# Patient Record
Sex: MALE | URBAN - METROPOLITAN AREA
[De-identification: names, ages, dates, MRNs, and addresses within clinical notes are randomized per-mention and may not be internally consistent; named-entity substitution may affect disease eponyms.]

---

## 2017-10-18 ENCOUNTER — OFFICE VISIT (OUTPATIENT)
Dept: DERMATOLOGY | Facility: AMBULATORY SURGERY CENTER | Age: 52
End: 2017-10-18

## 2017-10-18 VITALS
RESPIRATION RATE: 18 BRPM | OXYGEN SATURATION: 98 % | HEART RATE: 81 BPM | WEIGHT: 240 LBS | HEIGHT: 73 IN | DIASTOLIC BLOOD PRESSURE: 88 MMHG | SYSTOLIC BLOOD PRESSURE: 110 MMHG | TEMPERATURE: 98.2 F | BODY MASS INDEX: 31.81 KG/M2

## 2017-10-18 DIAGNOSIS — L82.1 SEBORRHEIC KERATOSES: ICD-10-CM

## 2017-10-18 DIAGNOSIS — D18.01 CHERRY ANGIOMA: ICD-10-CM

## 2017-10-18 DIAGNOSIS — L72.9 CYST OF SKIN: ICD-10-CM

## 2017-10-18 DIAGNOSIS — L91.8 CUTANEOUS SKIN TAGS: ICD-10-CM

## 2017-10-18 DIAGNOSIS — L81.4 LENTIGINES: ICD-10-CM

## 2017-10-18 DIAGNOSIS — D22.9 MULTIPLE BENIGN NEVI: Primary | ICD-10-CM

## 2017-10-18 NOTE — PROGRESS NOTES
Chief Complaint   Patient presents with    Skin Exam     1. Have you been to the ER, urgent care clinic since your last visit? Hospitalized since your last visit? No    2. Have you seen or consulted any other health care providers outside of the [address] [place] [address] since your last visit? Include any pap smears or colon screening.  No

## 2017-10-18 NOTE — PROGRESS NOTES
Name: Breanna Shukla       Age: 47 y.o. Date: 10/18/2017    Chief Complaint:   Chief Complaint   Patient presents with    Skin Exam       Subjective:    HPI  Mr. Tess Galdamez III is a 47 y.o. male who presents as a new patient to [facility] for a skin exam.  The patient was referred for this evaluation by himself. The patient has not had a skin exam in the past and the patient does have current complaints related to his skin. He reports a large cyst on the right posterior neck for many years. This was drained he reports about 20 years ago. It bothers his hard hat. He reports bothersome skin tags in the groin/ medial thighs. Some get twisted at time causing pain. The patient's pertinent skin history includes: no personal history of skin cancer but reports sun burns in the past. Typically wears a hat to protect his scalp. ROS: Constitutional: Negative. Dermatological : positive for - skin lesion changes      Social History     Social History    Marital status: UNKNOWN     Spouse name: N/A    Number of children: N/A    Years of education: N/A     Occupational History    Not on file. Social History Main Topics    Smoking status: Current Every Day Smoker     Packs/day: 1.00    Smokeless tobacco: Never Used    Alcohol use Yes    Drug use: Not on file    Sexual activity: Not on file     Other Topics Concern    Not on file     Social History Narrative    No narrative on file       History reviewed. No pertinent family history. Past Medical History:   Diagnosis Date    Arsenic suspected exposure     Sun-damaged skin     Sunburn, blistering        History reviewed. No pertinent surgical history.         No Known Allergies      Objective:    Visit Vitals    /88 (BP 1 Location: Left arm, BP Patient Position: Sitting)    Pulse 81    Temp 98.2 °F (36.8 °C) (Oral)    Resp 18    Ht 6' 1\" (1.854 m)    Wt 108.9 kg (240 lb)    SpO2 98%    BMI 31.66 kg/m2 Flavia Neff is a 47 y.o. male who appears well and in no distress. He is awake, alert, and oriented. There is no preauricular, submandibular, or cervical lymphadenopathy. A skin examination was performed including his scalp, face (including eyelid), ears, neck, chest, back, abdomen, upper extremities (including digits/nails), lower extremities, breasts, buttocks; genital skin was not examined. There is a 3 cm mobile cystic structure on the posterior right neck. This is not inflamed currently. He has areas of sebaceous hyperplasia and seborrheic keratoses on the face which are pink-yellow papules and skin toned papules respectively. He has lentigines on sun exposed areas. There is a few red papules consistent with cherry angiomas. He is scattered stuck on waxy macules consistent with seborrheic keratosis. There are medium brown junctional nevi without concerning features. There are numerous skin colored skin tags in the axillas and medial thighs. Assessment/Plan:  1. Normal nevi. The diagnosis of normal nevi was reviewed. I discussed sun protection, sunscreen use, the warning signs of skin cancer, the need for self-skin examinations, and the need for regular practitioner exams every 1 year. The patient should follow up sooner as needed if new, changing, or symptomatic skin lesions arise. 2. Seborrheic keratoses. The diagnosis was reviewed and the patient was reassured that no treatment is needed for these benign lesions. 3.Cherry angiomas. The diagnosis was reviewed and the patient was reassured that no treatment is needed for these benign lesions. 4.Solar lentigos. The diagnosis and relationship to sun exposure was reviewed. Sun protection advised. 5. Cyst of skin. The diagnosis discussed and patient will schedule for excision with Dr. Maldonado Stokes at his convenience. 6. Cutaneous skin tags. Snip removal of 15 tags were completed.  Patient understands this is an out of pocket procedure. Cleansing before was done with alcohol, Drysol used for hemostasis. Bandage applied and care reviewed. Discussed option for pathology but patient deferred.

## 2017-10-18 NOTE — MR AVS SNAPSHOT
Visit Information Date & Time Provider Department Dept. Phone Encounter #  
 10/18/2017 11:30 AM Ksenia Jarrell NP [facility] [phone] [id number] Upcoming Health Maintenance Date Due DTaP/Tdap/Td series (1 - Tdap) 9/10/1987 FOBT Q 1 YEAR AGE 50-75 9/10/2016 INFLUENZA AGE 9 TO ADULT 8/1/2017 Allergies as of 10/18/2017  Review Complete On: 10/18/2017 By: Lakisha Paige No Known Allergies Current Immunizations  Never Reviewed No immunizations on file. Not reviewed this visit Vitals BP Pulse Temp Resp Height(growth percentile) Weight(growth percentile) 110/88 (BP 1 Location: Left arm, BP Patient Position: Sitting) 81 98.2 °F (36.8 °C) (Oral) 18 6' 1\" (1.854 m) 240 lb (108.9 kg) SpO2 BMI Smoking Status 98% 31.66 kg/m2 Current Every Day Smoker BMI and BSA Data Body Mass Index Body Surface Area  
 31.66 kg/m 2 2.37 m 2 Preferred Pharmacy Pharmacy Name Phone [facility] [place] [facility] [address] [phone] Your Updated Medication List  
  
Notice  As of 10/18/2017 11:34 AM  
 You have not been prescribed any medications. Patient Instructions Self Skin Exam and Sunscreens Early detection and treatment is essential in the treatment of all forms of skin cancer. If caught early, all forms of skin cancer are curable. In addition to your regular visits, you should perform a monthly skin examination. Over time, you become familiar with what is normally found on your skin and can identify new or suspicious spots. One of the screening tools you can use to assess your skin is to follow the ABCDEs: 
 
A= Asymmetry (One half is unlike the other half) B= Border (An irregular, scalloped or poorly defined edge) C= Color (Is varied from one area to another, has shades of tan, brown/ black,       white, red or blue) D= Diameter (Spots larger than 6mm or a pencil eraser) E= Evolving (New spots or one that is changing in size, shape, or color) A follow- up interval will be customized based on your history of skin cancer or level of skin damage and risk factors. In any case, if you notice a suspicious or new spot, an appointment should be arranged between regular visits. Everyone should use sunscreen and sun-safe practices, which is especially important for those with a personal or family history of skin cancer. Suggestions for this include: 1. Use daily moisturizers containing SPF 30 or higher. 2. Wear long sleeve clothing with UPF ratings and a broad-brimmed hat. 3. Apply sunscreen with SPF 30 or higher to all sun exposed areas if you are going to be in the sun. A broad spectrum UVA/ UVB sunscreen is best.  Dont forget to REAPPLY every two hours or more often if swimming or sweating! 4. Avoid outside activities during peak sun hours, especially in the summer (10am- 2pm). 5. DO NOT use tanning beds. Using sunscreen and sun-safe practices can help reduce the likelihood of developing skin cancer or additional skin cancers in those previously diagnosed. Introducing [facility]! [employer] introduces [employer] patient portal. Now you can access parts of your medical record, email your doctor's office, and request medication refills online. 1. In your internet browser, go to [email or web address] 2. Click on the First Time User? Click Here link in the Sign In box. You will see the New Member Sign Up page. 3. Enter your [employer] Access Code exactly as it appears below. You will not need to use this code after youve completed the sign-up process. If you do not sign up before the expiration date, you must request a new code. · [employer] Access Code: [email or web address] Expires: 1/16/2018 11:33 AM 
 
4.  Enter the last four digits of your Social Security Number (xxxx) and Date of Birth (mm/dd/yyyy) as indicated and click Submit. You will be taken to the next sign-up page. 5. Create a [employer] ID. This will be your [employer] login ID and cannot be changed, so think of one that is secure and easy to remember. 6. Create a [employer] password. You can change your password at any time. 7. Enter your Password Reset Question and Answer. This can be used at a later time if you forget your password. 8. Enter your e-mail address. You will receive e-mail notification when new information is available in [address] 9. Click Sign Up. You can now view and download portions of your medical record. 10. Click the Download Summary menu link to download a portable copy of your medical information. If you have questions, please visit the Frequently Asked Questions section of the [employer] website. Remember, [employer] is NOT to be used for urgent needs. For medical emergencies, dial 911. Now available from your iPhone and Android! Please provide this summary of care documentation to your next provider. If you have any questions after today's visit, please call [phone].

## 2017-11-30 ENCOUNTER — OFFICE VISIT (OUTPATIENT)
Dept: DERMATOLOGY | Facility: AMBULATORY SURGERY CENTER | Age: 52
End: 2017-11-30

## 2017-11-30 ENCOUNTER — HOSPITAL ENCOUNTER (OUTPATIENT)
Dept: LAB | Age: 52
Discharge: HOME | End: 2017-11-30

## 2017-11-30 DIAGNOSIS — L72.0 EPIDERMAL CYST: Primary | ICD-10-CM

## 2017-11-30 NOTE — PROGRESS NOTES
Written by Alexis Rivera, as dictated by Dr. Marisela Major. Paddy Ni MD.    CC: Epidermal cyst on the right posterior neck    History of Present Illness:    Jose Silverman III is a 47 y.o. male referred by Mike Dang DNP. He has an epidermal cyst on the right posterior neck. This is a new epidermal cyst present for 20 years described as a bothersome lesion with no prior treatment but was previously drained. This is a clinically diagnosed epidermal cyst. He has no pain, no current illnesses, no other skin concerns. His allergies, medications, medical, and social history are reviewed by me today. Exam:    He is an awake, alert, and oriented 47 y.o. male who appears well and in no distress.  I examined his neck. He has a 45 x 35 mm mobile subcutaneous nodule on his right posterior neck. He confirms the location. Assessment/Plan:    1. Epidermal cyst, right posterior neck. Excision was advised for removal and therapy of this 45 x 35 mm lesion on the right posterior neck.  The excision procedure was reviewed and verbal and written consents were obtained.  The risks of pain, bleeding, infection, recurrence of the lesion, and scar were discussed.  I performed the procedure.  The site was cleansed and anesthetized with 1% lidocaine with epinephrine 1:100,000.  The lesion was excised in an elliptical manner to a depth of subcutaneous tissue.  A complex repair was performed after the excision. Three layers of 4-0 polysorb suture were used for skin approximation: deep subcutis, mid subcutis and dermis. The closure length was 33 mm.  The wound was bandaged and care reviewed.  The specimen was sent to pathology. I will contact the patient with the results and any further treatment that may be necessary. The documentation recorded by the scribe accurately reflects the service I personally performed and the decisions made by me.      [address] [facility]   OFFICE PROCEDURE PROGRESS NOTE     Chart reviewed for the following:     Paulina Meyers MD, have reviewed the History, Physical and updated the Allergic reactions for Susan Mujica III    TIME OUT performed immediately prior to start of procedure:     Paulina Meyers MD, have performed the following reviews on Susan Mujica III prior to the start of the procedure:     * Patient was identified by name and date of birth   * Agreement on procedure being performed was verified   * Risks and Benefits explained to the patient   * Procedure site verified and marked as necessary   * Patient was positioned for comfort   * Consent was signed and verified     Time: 3:30 PM   Date of procedure: 11/30/2017  Procedure performed by: Sebas Vargas.  Julian Meyers MD   Provider assisted by: LPN   Patient assisted by: self   How tolerated by patient: tolerated the procedure well with no complications   Comments: none

## 2024-02-29 ENCOUNTER — HOSPITAL ENCOUNTER (OUTPATIENT)
Facility: HOSPITAL | Age: 59
Discharge: HOME | End: 2024-02-29
Attending: INTERNAL MEDICINE
Payer: COMMERCIAL

## 2024-02-29 DIAGNOSIS — Z87.891 PERSONAL HISTORY OF TOBACCO USE: ICD-10-CM

## 2024-02-29 PROCEDURE — 71271 CT THORAX LUNG CANCER SCR C-: CPT

## 2024-11-11 ENCOUNTER — HOSPITAL ENCOUNTER (EMERGENCY)
Facility: HOSPITAL | Age: 59
Discharge: HOME | End: 2024-11-11
Attending: EMERGENCY MEDICINE
Payer: COMMERCIAL

## 2024-11-11 VITALS
BODY MASS INDEX: 33.46 KG/M2 | WEIGHT: 247 LBS | DIASTOLIC BLOOD PRESSURE: 74 MMHG | RESPIRATION RATE: 17 BRPM | HEART RATE: 65 BPM | OXYGEN SATURATION: 99 % | HEIGHT: 72 IN | SYSTOLIC BLOOD PRESSURE: 120 MMHG | TEMPERATURE: 97.6 F

## 2024-11-11 DIAGNOSIS — L03.012 PARONYCHIA OF LEFT MIDDLE FINGER: Primary | ICD-10-CM

## 2024-11-11 PROCEDURE — 10060 I&D ABSCESS SIMPLE/SINGLE: CPT

## 2024-11-11 PROCEDURE — 99283 EMERGENCY DEPT VISIT LOW MDM: CPT

## 2024-11-11 RX ORDER — MUPIROCIN 20 MG/G
OINTMENT TOPICAL
Qty: 3 G | Refills: 1 | Status: SHIPPED | OUTPATIENT
Start: 2024-11-11

## 2024-11-11 RX ORDER — CLINDAMYCIN HYDROCHLORIDE 300 MG/1
300 CAPSULE ORAL 3 TIMES DAILY
Qty: 30 CAPSULE | Refills: 0 | Status: SHIPPED | OUTPATIENT
Start: 2024-11-11 | End: 2024-11-21

## 2024-11-11 ASSESSMENT — PAIN SCALES - GENERAL: PAINLEVEL_OUTOF10: 1

## 2024-11-11 ASSESSMENT — LIFESTYLE VARIABLES
HOW OFTEN DO YOU HAVE A DRINK CONTAINING ALCOHOL: NEVER
HOW MANY STANDARD DRINKS CONTAINING ALCOHOL DO YOU HAVE ON A TYPICAL DAY: PATIENT DOES NOT DRINK

## 2024-11-11 ASSESSMENT — PAIN - FUNCTIONAL ASSESSMENT: PAIN_FUNCTIONAL_ASSESSMENT: 0-10

## 2024-11-11 NOTE — ED TRIAGE NOTES
PT arrived with c/o an infected left middle finger that he noticed a week ago, has attempted to manage at home but it has not improved

## 2024-11-11 NOTE — DISCHARGE INSTRUCTIONS
It was a pleasure taking care of you at [facility] Emergency Department today.  We know that when you come to [facility], you are entrusting us with your health, comfort, and safety.  Our physicians and nurses honor that trust, and we truly appreciate the opportunity to care for you and your loved ones.      We also value your feedback.  If you receive a survey about your Emergency Department experience today, please fill it out.  We care about our patients' feedback, and we listen to what you have to say.  Thank you!

## 2024-11-11 NOTE — ED PROVIDER NOTES
[facility] EMERGENCY DEP  EMERGENCY DEPARTMENT ENCOUNTER       Pt Name: Jose Díaz III  MRN: [id number]  Birthdate 1965  Date of evaluation: 11/11/2024  Provider: JUANCARLOS Adler   PCP: No primary care provider on file.  Note Started: 3:32 PM EST 11/11/24     CHIEF COMPLAINT       Chief Complaint   Patient presents with    Finger Injury     HISTORY OF PRESENT ILLNESS: 1 or more elements      History From: Patient  HPI Limitations: None     Jose Díaz III is a 59 y.o. male who presents by POV with complaints of pain to the left middle finger. He reports that about one week ago he was fixing a trashcan and may have hurt his finger. Since then the area around the nail has become swollen, tender and inflamed. His wife used an insulin needle and was able to drain some pus. Since then it has looked better. Denies fevers and chills. No history of DM or skin infections in the past.      Nursing Notes were all reviewed and agreed with or any disagreements were addressed in the HPI.     REVIEW OF SYSTEMS      Review of Systems     Positives and Pertinent negatives as per HPI.    PAST HISTORY     Past Medical History:  History reviewed. No pertinent past medical history.    Past Surgical History:  History reviewed. No pertinent surgical history.    Family History:  History reviewed. No pertinent family history.    Social History:       Allergies:  No Known Allergies    CURRENT MEDICATIONS      Previous Medications    No medications on file       SCREENINGS               No data recorded        PHYSICAL EXAM      ED Triage Vitals [11/11/24 1524]   Encounter Vitals Group      /80      Systolic BP Percentile       Diastolic BP Percentile       Pulse 62      Respirations 18      Temp 97.6 °F (36.4 °C)      Temp src       SpO2 96 %      Weight - Scale 112 kg (247 lb)      Height 1.829 m (6')      Head Circumference       Peak Flow       Pain Score       Pain Loc       Pain Education       Exclude from Growth Chart

## 2024-12-09 ENCOUNTER — TRANSCRIBE ORDERS (OUTPATIENT)
Facility: HOSPITAL | Age: 59
End: 2024-12-09

## 2024-12-09 DIAGNOSIS — F17.210 CIGARETTE SMOKER: ICD-10-CM

## 2024-12-09 DIAGNOSIS — Z87.891 PERSONAL HISTORY OF TOBACCO USE: Primary | ICD-10-CM

## 2025-04-11 ENCOUNTER — TRANSCRIBE ORDERS (OUTPATIENT)
Facility: HOSPITAL | Age: 60
End: 2025-04-11

## 2025-04-11 DIAGNOSIS — Z87.891 PERSONAL HISTORY OF TOBACCO USE: Primary | ICD-10-CM

## 2025-04-11 DIAGNOSIS — F17.210 CIGARETTE SMOKER: ICD-10-CM

## 2025-05-11 ENCOUNTER — APPOINTMENT (OUTPATIENT)
Facility: HOSPITAL | Age: 60
End: 2025-05-11
Payer: COMMERCIAL

## 2025-05-11 ENCOUNTER — HOSPITAL ENCOUNTER (INPATIENT)
Facility: HOSPITAL | Age: 60
LOS: 9 days | Discharge: HOME | DRG: 189 | End: 2025-05-20
Attending: INTERNAL MEDICINE | Admitting: INTERNAL MEDICINE
Payer: COMMERCIAL

## 2025-05-11 ENCOUNTER — HOSPITAL ENCOUNTER (EMERGENCY)
Facility: HOSPITAL | Age: 60
Discharge: ANOTHER HOSPITAL | End: 2025-05-11
Attending: EMERGENCY MEDICINE
Payer: COMMERCIAL

## 2025-05-11 VITALS
WEIGHT: 238.3 LBS | TEMPERATURE: 98.2 F | DIASTOLIC BLOOD PRESSURE: 108 MMHG | HEIGHT: 72 IN | RESPIRATION RATE: 27 BRPM | OXYGEN SATURATION: 96 % | BODY MASS INDEX: 32.28 KG/M2 | HEART RATE: 100 BPM | SYSTOLIC BLOOD PRESSURE: 153 MMHG

## 2025-05-11 DIAGNOSIS — R07.9 CHEST PAIN, UNSPECIFIED TYPE: ICD-10-CM

## 2025-05-11 DIAGNOSIS — I50.9 ACUTE CONGESTIVE HEART FAILURE, UNSPECIFIED HEART FAILURE TYPE (HCC): Primary | ICD-10-CM

## 2025-05-11 DIAGNOSIS — I35.0 SEVERE AORTIC STENOSIS: ICD-10-CM

## 2025-05-11 DIAGNOSIS — R79.89 ELEVATED BRAIN NATRIURETIC PEPTIDE (BNP) LEVEL: ICD-10-CM

## 2025-05-11 DIAGNOSIS — R60.0 LEG EDEMA, RIGHT: Primary | ICD-10-CM

## 2025-05-11 DIAGNOSIS — I50.9 CONGESTIVE HEART FAILURE, UNSPECIFIED HF CHRONICITY, UNSPECIFIED HEART FAILURE TYPE (HCC): ICD-10-CM

## 2025-05-11 PROBLEM — J96.01 ACUTE HYPOXEMIC RESPIRATORY FAILURE (HCC): Status: ACTIVE | Noted: 2025-05-11

## 2025-05-11 LAB
ALBUMIN SERPL-MCNC: 3.3 G/DL (ref 3.5–5)
ALBUMIN/GLOB SERPL: 1 (ref 1.1–2.2)
ALP SERPL-CCNC: 61 U/L (ref 45–117)
ALT SERPL-CCNC: 45 U/L (ref 12–78)
ANION GAP SERPL CALC-SCNC: 3 MMOL/L (ref 2–12)
ANION GAP SERPL CALC-SCNC: 3 MMOL/L (ref 2–12)
APPEARANCE UR: CLEAR
AST SERPL-CCNC: 26 U/L (ref 15–37)
BACTERIA URNS QL MICRO: NEGATIVE /HPF
BASOPHILS # BLD: 0 K/UL (ref 0–0.1)
BASOPHILS NFR BLD: 0 % (ref 0–1)
BILIRUB SERPL-MCNC: 1 MG/DL (ref 0.2–1)
BILIRUB UR QL: NEGATIVE
BUN SERPL-MCNC: 14 MG/DL (ref 6–20)
BUN SERPL-MCNC: 15 MG/DL (ref 6–20)
BUN/CREAT SERPL: 19 (ref 12–20)
BUN/CREAT SERPL: 19 (ref 12–20)
CALCIUM SERPL-MCNC: 8.4 MG/DL (ref 8.5–10.1)
CALCIUM SERPL-MCNC: 8.6 MG/DL (ref 8.5–10.1)
CHLORIDE SERPL-SCNC: 104 MMOL/L (ref 97–108)
CHLORIDE SERPL-SCNC: 104 MMOL/L (ref 97–108)
CO2 SERPL-SCNC: 32 MMOL/L (ref 21–32)
CO2 SERPL-SCNC: 34 MMOL/L (ref 21–32)
COLOR UR: NORMAL
CREAT SERPL-MCNC: 0.72 MG/DL (ref 0.7–1.3)
CREAT SERPL-MCNC: 0.78 MG/DL (ref 0.7–1.3)
DIFFERENTIAL METHOD BLD: ABNORMAL
EOSINOPHIL # BLD: 0 K/UL (ref 0–0.4)
EOSINOPHIL NFR BLD: 0 % (ref 0–7)
EPITH CASTS URNS QL MICRO: NORMAL /LPF
ERYTHROCYTE [DISTWIDTH] IN BLOOD BY AUTOMATED COUNT: 14.9 % (ref 11.5–14.5)
FLUAV RNA SPEC QL NAA+PROBE: NOT DETECTED
FLUBV RNA SPEC QL NAA+PROBE: NOT DETECTED
GLOBULIN SER CALC-MCNC: 3.4 G/DL (ref 2–4)
GLUCOSE BLD STRIP.AUTO-MCNC: 124 MG/DL (ref 65–117)
GLUCOSE SERPL-MCNC: 129 MG/DL (ref 65–100)
GLUCOSE SERPL-MCNC: 135 MG/DL (ref 65–100)
GLUCOSE UR STRIP.AUTO-MCNC: NEGATIVE MG/DL
HCT VFR BLD AUTO: 44.5 % (ref 36.6–50.3)
HGB BLD-MCNC: 14.3 G/DL (ref 12.1–17)
HGB UR QL STRIP: NEGATIVE
IMM GRANULOCYTES # BLD AUTO: 0 K/UL (ref 0–0.04)
IMM GRANULOCYTES NFR BLD AUTO: 0 % (ref 0–0.5)
KETONES UR QL STRIP.AUTO: NEGATIVE MG/DL
LACTATE SERPL-SCNC: 1.3 MMOL/L (ref 0.4–2)
LEUKOCYTE ESTERASE UR QL STRIP.AUTO: NEGATIVE
LYMPHOCYTES # BLD: 0.46 K/UL (ref 0.8–3.5)
LYMPHOCYTES NFR BLD: 3 % (ref 12–49)
MAGNESIUM SERPL-MCNC: 2.1 MG/DL (ref 1.6–2.4)
MCH RBC QN AUTO: 31.8 PG (ref 26–34)
MCHC RBC AUTO-ENTMCNC: 32.1 G/DL (ref 30–36.5)
MCV RBC AUTO: 98.9 FL (ref 80–99)
MONOCYTES # BLD: 1.23 K/UL (ref 0–1)
MONOCYTES NFR BLD: 8 % (ref 5–13)
MUCOUS THREADS URNS QL MICRO: NORMAL /LPF
NEUTS BAND NFR BLD MANUAL: 3 %
NEUTS SEG # BLD: 13.71 K/UL (ref 1.8–8)
NEUTS SEG NFR BLD: 86 % (ref 32–75)
NITRITE UR QL STRIP.AUTO: NEGATIVE
NRBC # BLD: 0 K/UL (ref 0–0.01)
NRBC BLD-RTO: 0 PER 100 WBC
NT PRO BNP: ABNORMAL PG/ML (ref 0–125)
PH UR STRIP: 6.5 (ref 5–8)
PLATELET # BLD AUTO: 158 K/UL (ref 150–400)
PLATELET COMMENT: ABNORMAL
PMV BLD AUTO: 10 FL (ref 8.9–12.9)
POTASSIUM SERPL-SCNC: 4.1 MMOL/L (ref 3.5–5.1)
POTASSIUM SERPL-SCNC: 4.2 MMOL/L (ref 3.5–5.1)
PROCALCITONIN SERPL-MCNC: 0.06 NG/ML
PROCALCITONIN SERPL-MCNC: <0.05 NG/ML
PROT SERPL-MCNC: 6.7 G/DL (ref 6.4–8.2)
PROT UR STRIP-MCNC: NEGATIVE MG/DL
RBC # BLD AUTO: 4.5 M/UL (ref 4.1–5.7)
RBC #/AREA URNS HPF: NORMAL /HPF (ref 0–5)
RBC MORPH BLD: ABNORMAL
SARS-COV-2 RNA RESP QL NAA+PROBE: NOT DETECTED
SERVICE CMNT-IMP: ABNORMAL
SODIUM SERPL-SCNC: 139 MMOL/L (ref 136–145)
SODIUM SERPL-SCNC: 141 MMOL/L (ref 136–145)
SOURCE: NORMAL
SP GR UR REFRACTOMETRY: 1.02 (ref 1–1.03)
TROPONIN I SERPL HS-MCNC: 202 NG/L (ref 0–76)
TROPONIN I SERPL HS-MCNC: 216 NG/L (ref 0–76)
TROPONIN I SERPL HS-MCNC: 218 NG/L (ref 0–76)
TROPONIN I SERPL HS-MCNC: 260 NG/L (ref 0–76)
URINE CULTURE IF INDICATED: NORMAL
UROBILINOGEN UR QL STRIP.AUTO: 1 EU/DL (ref 0.2–1)
WBC # BLD AUTO: 15.4 K/UL (ref 4.1–11.1)
WBC URNS QL MICRO: NORMAL /HPF (ref 0–4)

## 2025-05-11 PROCEDURE — 96367 TX/PROPH/DG ADDL SEQ IV INF: CPT

## 2025-05-11 PROCEDURE — 6370000000 HC RX 637 (ALT 250 FOR IP): Performed by: INTERNAL MEDICINE

## 2025-05-11 PROCEDURE — 6360000002 HC RX W HCPCS: Performed by: INTERNAL MEDICINE

## 2025-05-11 PROCEDURE — 81001 URINALYSIS AUTO W/SCOPE: CPT

## 2025-05-11 PROCEDURE — 84484 ASSAY OF TROPONIN QUANT: CPT

## 2025-05-11 PROCEDURE — 83880 ASSAY OF NATRIURETIC PEPTIDE: CPT

## 2025-05-11 PROCEDURE — 96365 THER/PROPH/DIAG IV INF INIT: CPT

## 2025-05-11 PROCEDURE — 94640 AIRWAY INHALATION TREATMENT: CPT

## 2025-05-11 PROCEDURE — 71045 X-RAY EXAM CHEST 1 VIEW: CPT

## 2025-05-11 PROCEDURE — 83605 ASSAY OF LACTIC ACID: CPT

## 2025-05-11 PROCEDURE — 87636 SARSCOV2 & INF A&B AMP PRB: CPT

## 2025-05-11 PROCEDURE — 84145 PROCALCITONIN (PCT): CPT

## 2025-05-11 PROCEDURE — 5A09357 ASSISTANCE WITH RESPIRATORY VENTILATION, LESS THAN 24 CONSECUTIVE HOURS, CONTINUOUS POSITIVE AIRWAY PRESSURE: ICD-10-PCS | Performed by: INTERNAL MEDICINE

## 2025-05-11 PROCEDURE — 80053 COMPREHEN METABOLIC PANEL: CPT

## 2025-05-11 PROCEDURE — 6360000002 HC RX W HCPCS: Performed by: EMERGENCY MEDICINE

## 2025-05-11 PROCEDURE — 96375 TX/PRO/DX INJ NEW DRUG ADDON: CPT

## 2025-05-11 PROCEDURE — 80048 BASIC METABOLIC PNL TOTAL CA: CPT

## 2025-05-11 PROCEDURE — 5A0945A ASSISTANCE WITH RESPIRATORY VENTILATION, 24-96 CONSECUTIVE HOURS, HIGH NASAL FLOW/VELOCITY: ICD-10-PCS | Performed by: INTERNAL MEDICINE

## 2025-05-11 PROCEDURE — 93005 ELECTROCARDIOGRAM TRACING: CPT | Performed by: EMERGENCY MEDICINE

## 2025-05-11 PROCEDURE — 6360000002 HC RX W HCPCS: Performed by: NURSE PRACTITIONER

## 2025-05-11 PROCEDURE — 82962 GLUCOSE BLOOD TEST: CPT

## 2025-05-11 PROCEDURE — 87040 BLOOD CULTURE FOR BACTERIA: CPT

## 2025-05-11 PROCEDURE — 2000000000 HC ICU R&B

## 2025-05-11 PROCEDURE — 6370000000 HC RX 637 (ALT 250 FOR IP): Performed by: EMERGENCY MEDICINE

## 2025-05-11 PROCEDURE — 36415 COLL VENOUS BLD VENIPUNCTURE: CPT

## 2025-05-11 PROCEDURE — 93005 ELECTROCARDIOGRAM TRACING: CPT

## 2025-05-11 PROCEDURE — 2500000003 HC RX 250 WO HCPCS: Performed by: INTERNAL MEDICINE

## 2025-05-11 PROCEDURE — 85025 COMPLETE CBC W/AUTO DIFF WBC: CPT

## 2025-05-11 PROCEDURE — 2580000003 HC RX 258: Performed by: EMERGENCY MEDICINE

## 2025-05-11 PROCEDURE — 83735 ASSAY OF MAGNESIUM: CPT

## 2025-05-11 PROCEDURE — 94660 CPAP INITIATION&MGMT: CPT

## 2025-05-11 PROCEDURE — 96366 THER/PROPH/DIAG IV INF ADDON: CPT

## 2025-05-11 PROCEDURE — 99291 CRITICAL CARE FIRST HOUR: CPT

## 2025-05-11 RX ORDER — FUROSEMIDE 10 MG/ML
40 INJECTION INTRAMUSCULAR; INTRAVENOUS ONCE
Status: DISCONTINUED | OUTPATIENT
Start: 2025-05-11 | End: 2025-05-11

## 2025-05-11 RX ORDER — GLUCAGON 1 MG/ML
1 KIT INJECTION PRN
Status: DISCONTINUED | OUTPATIENT
Start: 2025-05-11 | End: 2025-05-20 | Stop reason: HOSPADM

## 2025-05-11 RX ORDER — FLUTICASONE FUROATE, UMECLIDINIUM BROMIDE AND VILANTEROL TRIFENATATE 100; 62.5; 25 UG/1; UG/1; UG/1
1 POWDER RESPIRATORY (INHALATION) DAILY
Status: ON HOLD | COMMUNITY
End: 2025-05-20

## 2025-05-11 RX ORDER — ENOXAPARIN SODIUM 150 MG/ML
1 INJECTION SUBCUTANEOUS 2 TIMES DAILY
Status: DISCONTINUED | OUTPATIENT
Start: 2025-05-11 | End: 2025-05-13

## 2025-05-11 RX ORDER — ALBUTEROL SULFATE 0.83 MG/ML
2.5 SOLUTION RESPIRATORY (INHALATION) EVERY 4 HOURS PRN
Status: DISCONTINUED | OUTPATIENT
Start: 2025-05-11 | End: 2025-05-12

## 2025-05-11 RX ORDER — MIDAZOLAM HYDROCHLORIDE 1 MG/ML
2 INJECTION, SOLUTION INTRAMUSCULAR; INTRAVENOUS ONCE
Status: DISCONTINUED | OUTPATIENT
Start: 2025-05-11 | End: 2025-05-11 | Stop reason: HOSPADM

## 2025-05-11 RX ORDER — IPRATROPIUM BROMIDE AND ALBUTEROL SULFATE 2.5; .5 MG/3ML; MG/3ML
1 SOLUTION RESPIRATORY (INHALATION)
Status: COMPLETED | OUTPATIENT
Start: 2025-05-11 | End: 2025-05-11

## 2025-05-11 RX ORDER — ACETAMINOPHEN 650 MG/1
650 SUPPOSITORY RECTAL EVERY 6 HOURS PRN
Status: DISCONTINUED | OUTPATIENT
Start: 2025-05-11 | End: 2025-05-20 | Stop reason: HOSPADM

## 2025-05-11 RX ORDER — DEXTROSE MONOHYDRATE 100 MG/ML
INJECTION, SOLUTION INTRAVENOUS CONTINUOUS PRN
Status: DISCONTINUED | OUTPATIENT
Start: 2025-05-11 | End: 2025-05-20 | Stop reason: HOSPADM

## 2025-05-11 RX ORDER — SODIUM CHLORIDE 0.9 % (FLUSH) 0.9 %
5-40 SYRINGE (ML) INJECTION PRN
Status: DISCONTINUED | OUTPATIENT
Start: 2025-05-11 | End: 2025-05-19 | Stop reason: SDUPTHER

## 2025-05-11 RX ORDER — ONDANSETRON 2 MG/ML
4 INJECTION INTRAMUSCULAR; INTRAVENOUS EVERY 6 HOURS PRN
Status: DISCONTINUED | OUTPATIENT
Start: 2025-05-11 | End: 2025-05-20 | Stop reason: HOSPADM

## 2025-05-11 RX ORDER — ONDANSETRON 4 MG/1
4 TABLET, ORALLY DISINTEGRATING ORAL EVERY 8 HOURS PRN
Status: DISCONTINUED | OUTPATIENT
Start: 2025-05-11 | End: 2025-05-20 | Stop reason: HOSPADM

## 2025-05-11 RX ORDER — SODIUM CHLORIDE 9 MG/ML
INJECTION, SOLUTION INTRAVENOUS PRN
Status: DISCONTINUED | OUTPATIENT
Start: 2025-05-11 | End: 2025-05-20 | Stop reason: HOSPADM

## 2025-05-11 RX ORDER — POLYETHYLENE GLYCOL 3350 17 G/17G
17 POWDER, FOR SOLUTION ORAL DAILY PRN
Status: DISCONTINUED | OUTPATIENT
Start: 2025-05-11 | End: 2025-05-20 | Stop reason: HOSPADM

## 2025-05-11 RX ORDER — ACETAMINOPHEN 325 MG/1
650 TABLET ORAL EVERY 6 HOURS PRN
Status: DISCONTINUED | OUTPATIENT
Start: 2025-05-11 | End: 2025-05-20 | Stop reason: HOSPADM

## 2025-05-11 RX ORDER — IPRATROPIUM BROMIDE AND ALBUTEROL SULFATE 2.5; .5 MG/3ML; MG/3ML
1 SOLUTION RESPIRATORY (INHALATION) ONCE
Status: COMPLETED | OUTPATIENT
Start: 2025-05-11 | End: 2025-05-11

## 2025-05-11 RX ORDER — FUROSEMIDE 10 MG/ML
40 INJECTION INTRAMUSCULAR; INTRAVENOUS ONCE
Status: COMPLETED | OUTPATIENT
Start: 2025-05-11 | End: 2025-05-11

## 2025-05-11 RX ORDER — INSULIN LISPRO 100 [IU]/ML
0-8 INJECTION, SOLUTION INTRAVENOUS; SUBCUTANEOUS
Status: DISCONTINUED | OUTPATIENT
Start: 2025-05-11 | End: 2025-05-20 | Stop reason: HOSPADM

## 2025-05-11 RX ORDER — FUROSEMIDE 10 MG/ML
40 INJECTION INTRAMUSCULAR; INTRAVENOUS
Status: COMPLETED | OUTPATIENT
Start: 2025-05-11 | End: 2025-05-11

## 2025-05-11 RX ORDER — SODIUM CHLORIDE 0.9 % (FLUSH) 0.9 %
5-40 SYRINGE (ML) INJECTION EVERY 12 HOURS SCHEDULED
Status: DISCONTINUED | OUTPATIENT
Start: 2025-05-11 | End: 2025-05-19 | Stop reason: SDUPTHER

## 2025-05-11 RX ADMIN — IPRATROPIUM BROMIDE AND ALBUTEROL SULFATE 1 DOSE: .5; 2.5 SOLUTION RESPIRATORY (INHALATION) at 15:38

## 2025-05-11 RX ADMIN — ENOXAPARIN SODIUM 105 MG: 150 INJECTION SUBCUTANEOUS at 19:49

## 2025-05-11 RX ADMIN — ARFORMOTEROL TARTRATE: 15 SOLUTION RESPIRATORY (INHALATION) at 20:20

## 2025-05-11 RX ADMIN — IPRATROPIUM BROMIDE AND ALBUTEROL SULFATE 1 DOSE: .5; 3 SOLUTION RESPIRATORY (INHALATION) at 20:27

## 2025-05-11 RX ADMIN — IPRATROPIUM BROMIDE AND ALBUTEROL SULFATE 1 DOSE: .5; 2.5 SOLUTION RESPIRATORY (INHALATION) at 12:26

## 2025-05-11 RX ADMIN — PIPERACILLIN AND TAZOBACTAM 4500 MG: 4; .5 INJECTION, POWDER, FOR SOLUTION INTRAVENOUS at 12:37

## 2025-05-11 RX ADMIN — FUROSEMIDE 40 MG: 10 INJECTION, SOLUTION INTRAMUSCULAR; INTRAVENOUS at 13:41

## 2025-05-11 RX ADMIN — FUROSEMIDE 40 MG: 10 INJECTION, SOLUTION INTRAMUSCULAR; INTRAVENOUS at 20:13

## 2025-05-11 RX ADMIN — IPRATROPIUM BROMIDE 0.5 MG: 0.5 SOLUTION RESPIRATORY (INHALATION) at 20:15

## 2025-05-11 RX ADMIN — SODIUM CHLORIDE, PRESERVATIVE FREE 10 ML: 5 INJECTION INTRAVENOUS at 19:54

## 2025-05-11 RX ADMIN — WATER 2000 MG: 1 INJECTION INTRAMUSCULAR; INTRAVENOUS; SUBCUTANEOUS at 19:50

## 2025-05-11 RX ADMIN — SODIUM CHLORIDE 2500 MG: 0.9 INJECTION, SOLUTION INTRAVENOUS at 13:09

## 2025-05-11 RX ADMIN — WATER 80 MG: 1 INJECTION INTRAMUSCULAR; INTRAVENOUS; SUBCUTANEOUS at 19:45

## 2025-05-11 RX ADMIN — INSULIN HUMAN 10 UNITS: 100 INJECTION, SUSPENSION SUBCUTANEOUS at 19:54

## 2025-05-11 RX ADMIN — IPRATROPIUM BROMIDE AND ALBUTEROL SULFATE 1 DOSE: .5; 2.5 SOLUTION RESPIRATORY (INHALATION) at 15:40

## 2025-05-11 ASSESSMENT — LIFESTYLE VARIABLES
HOW MANY STANDARD DRINKS CONTAINING ALCOHOL DO YOU HAVE ON A TYPICAL DAY: PATIENT DOES NOT DRINK
HOW OFTEN DO YOU HAVE A DRINK CONTAINING ALCOHOL: NEVER

## 2025-05-11 ASSESSMENT — PAIN - FUNCTIONAL ASSESSMENT
PAIN_FUNCTIONAL_ASSESSMENT: NONE - DENIES PAIN
PAIN_FUNCTIONAL_ASSESSMENT: 0-10

## 2025-05-11 ASSESSMENT — PAIN SCALES - GENERAL: PAINLEVEL_OUTOF10: 0

## 2025-05-11 NOTE — ED NOTES
Patient had a 4 beat run of V-tach and patient became acutely SOB with extreme diaphoresis.  Er provider in room.  Repeat EKG completed with repeat portable chest.  RT called for treatment

## 2025-05-11 NOTE — ED PROVIDER NOTES
[facility] EMERGENCY DEPARTMENT  EMERGENCY DEPARTMENT ENCOUNTER       Pt Name: Jose Díaz III  MRN: [id number]  Birthdate 1965  Date of evaluation: 5/11/2025  Provider: Mattie Reich MD   PCP: No primary care provider on file.  Note Started: 1:00 PM EDT 5/11/25     CHIEF COMPLAINT       Chief Complaint   Patient presents with    Shortness of Breath        HISTORY OF PRESENT ILLNESS: 1 or more elements      History From: Patient, History limited by: none     Jose Díaz III is a 59 y.o. male presents to ED complaining of shortness of breath.  Onset of shortness of breath gradually worsening over the past week, suddenly worse today.  History of COPD.  Denies fever.  Reports some phlegm.       Please See MDM for Additional Details of the HPI/PMH  Nursing Notes were all reviewed and agreed with or any disagreements were addressed in the HPI.     REVIEW OF SYSTEMS        Positives and Pertinent negatives as per HPI.    PAST HISTORY     Past Medical History:  No past medical history on file.    Past Surgical History:  No past surgical history on file.    Family History:  No family history on file.    Social History:       CURRENT MEDICATIONS      Previous Medications    MUPIROCIN (BACTROBAN) 2 % OINTMENT    Apply topically to affected area 3 times daily.       SCREENINGS               No data recorded            PHYSICAL EXAM      ED Triage Vitals [05/11/25 1212]   Encounter Vitals Group      BP (!) 125/102      Systolic BP Percentile       Diastolic BP Percentile       Pulse (!) 104      Respirations 25      Temp       Temp src       SpO2 (!) 77 %      Weight - Scale 108.1 kg (238 lb 4.8 oz)      Height 1.829 m (6')      Head Circumference       Peak Flow       Pain Score       Pain Loc       Pain Education       Exclude from Growth Chart         Physical Exam  Constitutional:       General: He is in acute distress.   Cardiovascular:      Rate and Rhythm: Normal rate and regular rhythm.   Pulmonary:

## 2025-05-11 NOTE — ED NOTES
TRANSFER - OUT REPORT:    Verbal report given to Mamta Marin RN on Jose Díaz III  being transferred to [facility] for routine progression of patient care       Report consisted of patient's Situation, Background, Assessment and   Recommendations(SBAR).     Information from the following report(s) Nurse Handoff Report, ED SBAR, MAR, Recent Results, Cardiac Rhythm Sinus Tach, and Neuro Assessment was reviewed with the receiving nurse.    [place] Fall Assessment:    Presents to emergency department  because of falls (Syncope, seizure, or loss of consciousness): No  Age > 70: No  Altered Mental Status, Intoxication with alcohol or substance confusion (Disorientation, impaired judgment, poor safety awaremess, or inability to follow instructions): No  Impaired Mobility: Ambulates or transfers with assistive devices or assistance; Unable to ambulate or transer.: No  Nursing Judgement: No          Lines:   Peripheral IV 05/11/25 Right Antecubital (Active)   Site Assessment Clean, dry & intact 05/11/25 1216   Line Status Blood return noted;Flushed 05/11/25 1216   Line Care Connections checked and tightened 05/11/25 1216   Phlebitis Assessment No symptoms 05/11/25 1216   Infiltration Assessment 0 05/11/25 1216   Dressing Status Clean, dry & intact 05/11/25 1216   Dressing Type Transparent 05/11/25 1216   Dressing Intervention New 05/11/25 1216       Peripheral IV 05/11/25 Left Antecubital (Active)   Site Assessment Clean, dry & intact 05/11/25 1539   Line Status Brisk blood return;Blood return noted 05/11/25 1539   Line Care Connections checked and tightened 05/11/25 1539   Phlebitis Assessment No symptoms 05/11/25 1539   Infiltration Assessment 0 05/11/25 1539   Alcohol Cap Used Yes 05/11/25 1539   Dressing Status Clean, dry & intact 05/11/25 1539   Dressing Type Transparent 05/11/25 1539   Dressing Intervention New 05/11/25 1539        Opportunity for questions and clarification was provided.      Patient transported

## 2025-05-11 NOTE — ED TRIAGE NOTES
SOB that started yesterday, denies fever, or recent illness. Hx COPD. Used rescue inhaler at home without relief.

## 2025-05-11 NOTE — PROGRESS NOTES
Nebulizer treatment stopped by provider.  Proceeded to place pt on Bipap, 14/6, pt tolerating and appears more comfortable.

## 2025-05-11 NOTE — ED NOTES
[facility] here to  patient. Report given to [facility] by Dr. Reich

## 2025-05-11 NOTE — PROGRESS NOTES
1710- Patient being transferred from [facility] to [facility] CCU for routine progression of care. Verbal SBAR report received from Roxane Henriquez RN.     1830- Patient arrived to CCU 2510 accompanied by flight RN & paramedic.     1835- Dr. Shabazz at bedside to assess patient. Bi-pap removed and switched to 6L NC by respiratory. New orders received for EKG & labs.     1840- Up to bedside commode. Pt became increasingly dyspneic following ambulation with spo2 dropping to 70s. Assisted back to bed and placed back on Bi-pap by respiratory.     1856- troponin and procalcitonin sent to lab.     1905- EKG complete and brought to Dr. Shabazz.

## 2025-05-12 ENCOUNTER — APPOINTMENT (OUTPATIENT)
Facility: HOSPITAL | Age: 60
DRG: 189 | End: 2025-05-12
Attending: INTERNAL MEDICINE
Payer: COMMERCIAL

## 2025-05-12 LAB
ALBUMIN SERPL-MCNC: 2.8 G/DL (ref 3.5–5)
ALBUMIN/GLOB SERPL: 0.9 (ref 1.1–2.2)
ALP SERPL-CCNC: 50 U/L (ref 45–117)
ALT SERPL-CCNC: 33 U/L (ref 12–78)
ANION GAP SERPL CALC-SCNC: 3 MMOL/L (ref 2–12)
AST SERPL-CCNC: 22 U/L (ref 15–37)
BASOPHILS # BLD: 0.02 K/UL (ref 0–0.1)
BASOPHILS NFR BLD: 0.2 % (ref 0–1)
BILIRUB SERPL-MCNC: 0.7 MG/DL (ref 0.2–1)
BUN SERPL-MCNC: 15 MG/DL (ref 6–20)
BUN/CREAT SERPL: 21 (ref 12–20)
CALCIUM SERPL-MCNC: 8.4 MG/DL (ref 8.5–10.1)
CHLORIDE SERPL-SCNC: 103 MMOL/L (ref 97–108)
CO2 SERPL-SCNC: 34 MMOL/L (ref 21–32)
CREAT SERPL-MCNC: 0.73 MG/DL (ref 0.7–1.3)
DIFFERENTIAL METHOD BLD: ABNORMAL
ECHO AO ROOT DIAM: 3.3 CM
ECHO AO ROOT INDEX: 1.43 CM/M2
ECHO AV AREA PEAK VELOCITY: 0.8 CM2
ECHO AV AREA VTI: 0.7 CM2
ECHO AV AREA/BSA PEAK VELOCITY: 0.3 CM2/M2
ECHO AV AREA/BSA VTI: 0.3 CM2/M2
ECHO AV MEAN GRADIENT: 68 MMHG
ECHO AV MEAN VELOCITY: 4 M/S
ECHO AV PEAK GRADIENT: 99 MMHG
ECHO AV PEAK VELOCITY: 5 M/S
ECHO AV VELOCITY RATIO: 0.18
ECHO AV VTI: 113.7 CM
ECHO BSA: 2.36 M2
ECHO BSA: 2.36 M2
ECHO EST RA PRESSURE: 8 MMHG
ECHO LA DIAMETER INDEX: 2.29 CM/M2
ECHO LA DIAMETER: 5.3 CM
ECHO LA TO AORTIC ROOT RATIO: 1.61
ECHO LV EF PHYSICIAN: 40 %
ECHO LV FRACTIONAL SHORTENING: 25 % (ref 28–44)
ECHO LV INTERNAL DIMENSION DIASTOLE INDEX: 2.77 CM/M2
ECHO LV INTERNAL DIMENSION DIASTOLIC: 6.4 CM (ref 4.2–5.9)
ECHO LV INTERNAL DIMENSION SYSTOLIC INDEX: 2.08 CM/M2
ECHO LV INTERNAL DIMENSION SYSTOLIC: 4.8 CM
ECHO LV IVSD: 1.7 CM (ref 0.6–1)
ECHO LV MASS 2D: 565.6 G (ref 88–224)
ECHO LV MASS INDEX 2D: 244.8 G/M2 (ref 49–115)
ECHO LV POSTERIOR WALL DIASTOLIC: 1.7 CM (ref 0.6–1)
ECHO LV RELATIVE WALL THICKNESS RATIO: 0.53
ECHO LVOT AREA: 4.5 CM2
ECHO LVOT AV VTI INDEX: 0.15
ECHO LVOT DIAM: 2.4 CM
ECHO LVOT MEAN GRADIENT: 2 MMHG
ECHO LVOT PEAK GRADIENT: 3 MMHG
ECHO LVOT PEAK VELOCITY: 0.9 M/S
ECHO LVOT STROKE VOLUME INDEX: 33.7 ML/M2
ECHO LVOT SV: 77.8 ML
ECHO LVOT VTI: 17.2 CM
EKG ATRIAL RATE: 106 BPM
EKG ATRIAL RATE: 96 BPM
EKG DIAGNOSIS: NORMAL
EKG DIAGNOSIS: NORMAL
EKG P AXIS: 64 DEGREES
EKG P AXIS: 66 DEGREES
EKG P-R INTERVAL: 188 MS
EKG P-R INTERVAL: 198 MS
EKG Q-T INTERVAL: 348 MS
EKG Q-T INTERVAL: 376 MS
EKG QRS DURATION: 104 MS
EKG QRS DURATION: 108 MS
EKG QTC CALCULATION (BAZETT): 462 MS
EKG QTC CALCULATION (BAZETT): 475 MS
EKG R AXIS: -34 DEGREES
EKG R AXIS: -36 DEGREES
EKG T AXIS: 111 DEGREES
EKG T AXIS: 120 DEGREES
EKG VENTRICULAR RATE: 106 BPM
EKG VENTRICULAR RATE: 96 BPM
EOSINOPHIL # BLD: 0 K/UL (ref 0–0.4)
EOSINOPHIL NFR BLD: 0 % (ref 0–7)
ERYTHROCYTE [DISTWIDTH] IN BLOOD BY AUTOMATED COUNT: 14.7 % (ref 11.5–14.5)
EST. AVERAGE GLUCOSE BLD GHB EST-MCNC: 103 MG/DL
GLOBULIN SER CALC-MCNC: 3.1 G/DL (ref 2–4)
GLUCOSE BLD STRIP.AUTO-MCNC: 116 MG/DL (ref 65–117)
GLUCOSE BLD STRIP.AUTO-MCNC: 133 MG/DL (ref 65–117)
GLUCOSE BLD STRIP.AUTO-MCNC: 144 MG/DL (ref 65–117)
GLUCOSE BLD STRIP.AUTO-MCNC: 187 MG/DL (ref 65–117)
GLUCOSE SERPL-MCNC: 141 MG/DL (ref 65–100)
HBA1C MFR BLD: 5.2 % (ref 4–5.6)
HCT VFR BLD AUTO: 42.2 % (ref 36.6–50.3)
HGB BLD-MCNC: 13.3 G/DL (ref 12.1–17)
IMM GRANULOCYTES # BLD AUTO: 0.06 K/UL (ref 0–0.04)
IMM GRANULOCYTES NFR BLD AUTO: 0.5 % (ref 0–0.5)
LYMPHOCYTES # BLD: 0.11 K/UL (ref 0.8–3.5)
LYMPHOCYTES NFR BLD: 0.9 % (ref 12–49)
MCH RBC QN AUTO: 31.1 PG (ref 26–34)
MCHC RBC AUTO-ENTMCNC: 31.5 G/DL (ref 30–36.5)
MCV RBC AUTO: 98.8 FL (ref 80–99)
MONOCYTES # BLD: 0.32 K/UL (ref 0–1)
MONOCYTES NFR BLD: 2.7 % (ref 5–13)
NEUTS SEG # BLD: 11.2 K/UL (ref 1.8–8)
NEUTS SEG NFR BLD: 95.7 % (ref 32–75)
NRBC # BLD: 0 K/UL (ref 0–0.01)
NRBC BLD-RTO: 0 PER 100 WBC
NT PRO BNP: ABNORMAL PG/ML
PLATELET # BLD AUTO: 134 K/UL (ref 150–400)
PMV BLD AUTO: 10.1 FL (ref 8.9–12.9)
POTASSIUM SERPL-SCNC: 4.1 MMOL/L (ref 3.5–5.1)
PROCALCITONIN SERPL-MCNC: 0.1 NG/ML
PROT SERPL-MCNC: 5.9 G/DL (ref 6.4–8.2)
RBC # BLD AUTO: 4.27 M/UL (ref 4.1–5.7)
RBC MORPH BLD: ABNORMAL
SERVICE CMNT-IMP: ABNORMAL
SERVICE CMNT-IMP: NORMAL
SODIUM SERPL-SCNC: 140 MMOL/L (ref 136–145)
TROPONIN I SERPL HS-MCNC: 180 NG/L (ref 0–76)
WBC # BLD AUTO: 11.7 K/UL (ref 4.1–11.1)

## 2025-05-12 PROCEDURE — 6360000002 HC RX W HCPCS: Performed by: INTERNAL MEDICINE

## 2025-05-12 PROCEDURE — 71045 X-RAY EXAM CHEST 1 VIEW: CPT

## 2025-05-12 PROCEDURE — 84484 ASSAY OF TROPONIN QUANT: CPT

## 2025-05-12 PROCEDURE — 85025 COMPLETE CBC W/AUTO DIFF WBC: CPT

## 2025-05-12 PROCEDURE — 94660 CPAP INITIATION&MGMT: CPT

## 2025-05-12 PROCEDURE — 93970 EXTREMITY STUDY: CPT

## 2025-05-12 PROCEDURE — 82962 GLUCOSE BLOOD TEST: CPT

## 2025-05-12 PROCEDURE — 6370000000 HC RX 637 (ALT 250 FOR IP): Performed by: INTERNAL MEDICINE

## 2025-05-12 PROCEDURE — 94640 AIRWAY INHALATION TREATMENT: CPT

## 2025-05-12 PROCEDURE — 36415 COLL VENOUS BLD VENIPUNCTURE: CPT

## 2025-05-12 PROCEDURE — 84145 PROCALCITONIN (PCT): CPT

## 2025-05-12 PROCEDURE — 93308 TTE F-UP OR LMTD: CPT

## 2025-05-12 PROCEDURE — 2060000000 HC ICU INTERMEDIATE R&B

## 2025-05-12 PROCEDURE — 2500000003 HC RX 250 WO HCPCS: Performed by: INTERNAL MEDICINE

## 2025-05-12 PROCEDURE — 6360000004 HC RX CONTRAST MEDICATION: Performed by: STUDENT IN AN ORGANIZED HEALTH CARE EDUCATION/TRAINING PROGRAM

## 2025-05-12 PROCEDURE — 80053 COMPREHEN METABOLIC PANEL: CPT

## 2025-05-12 PROCEDURE — 71275 CT ANGIOGRAPHY CHEST: CPT

## 2025-05-12 PROCEDURE — 83036 HEMOGLOBIN GLYCOSYLATED A1C: CPT

## 2025-05-12 PROCEDURE — 83880 ASSAY OF NATRIURETIC PEPTIDE: CPT

## 2025-05-12 RX ORDER — PANTOPRAZOLE SODIUM 40 MG/1
40 TABLET, DELAYED RELEASE ORAL
Status: DISCONTINUED | OUTPATIENT
Start: 2025-05-13 | End: 2025-05-20 | Stop reason: HOSPADM

## 2025-05-12 RX ORDER — DIAZEPAM 5 MG/1
10 TABLET ORAL
Status: DISCONTINUED | OUTPATIENT
Start: 2025-05-12 | End: 2025-05-20 | Stop reason: HOSPADM

## 2025-05-12 RX ORDER — PREDNISONE 5 MG/1
15 TABLET ORAL DAILY
Status: DISCONTINUED | OUTPATIENT
Start: 2025-05-16 | End: 2025-05-15

## 2025-05-12 RX ORDER — DIAZEPAM 5 MG/1
20 TABLET ORAL
Status: DISCONTINUED | OUTPATIENT
Start: 2025-05-12 | End: 2025-05-20 | Stop reason: HOSPADM

## 2025-05-12 RX ORDER — IOPAMIDOL 755 MG/ML
100 INJECTION, SOLUTION INTRAVASCULAR
Status: COMPLETED | OUTPATIENT
Start: 2025-05-12 | End: 2025-05-12

## 2025-05-12 RX ORDER — PREDNISONE 5 MG/1
10 TABLET ORAL DAILY
Status: DISCONTINUED | OUTPATIENT
Start: 2025-05-19 | End: 2025-05-15

## 2025-05-12 RX ORDER — DIAZEPAM 5 MG/1
15 TABLET ORAL
Status: DISCONTINUED | OUTPATIENT
Start: 2025-05-12 | End: 2025-05-20 | Stop reason: HOSPADM

## 2025-05-12 RX ORDER — DIAZEPAM 10 MG/2ML
10 INJECTION, SOLUTION INTRAMUSCULAR; INTRAVENOUS
Status: DISCONTINUED | OUTPATIENT
Start: 2025-05-12 | End: 2025-05-20 | Stop reason: HOSPADM

## 2025-05-12 RX ORDER — DIAZEPAM 10 MG/2ML
15 INJECTION, SOLUTION INTRAMUSCULAR; INTRAVENOUS
Status: DISCONTINUED | OUTPATIENT
Start: 2025-05-12 | End: 2025-05-20 | Stop reason: HOSPADM

## 2025-05-12 RX ORDER — PREDNISONE 5 MG/1
5 TABLET ORAL DAILY
Status: DISCONTINUED | OUTPATIENT
Start: 2025-05-22 | End: 2025-05-15

## 2025-05-12 RX ORDER — PREDNISONE 20 MG/1
20 TABLET ORAL DAILY
Status: COMPLETED | OUTPATIENT
Start: 2025-05-13 | End: 2025-05-15

## 2025-05-12 RX ORDER — LEVOFLOXACIN 750 MG/1
750 TABLET, FILM COATED ORAL EVERY 24 HOURS
Status: COMPLETED | OUTPATIENT
Start: 2025-05-12 | End: 2025-05-15

## 2025-05-12 RX ORDER — LEVALBUTEROL INHALATION SOLUTION 0.63 MG/3ML
0.63 SOLUTION RESPIRATORY (INHALATION) EVERY 8 HOURS PRN
Status: DISCONTINUED | OUTPATIENT
Start: 2025-05-12 | End: 2025-05-20 | Stop reason: HOSPADM

## 2025-05-12 RX ORDER — DIAZEPAM 10 MG/2ML
20 INJECTION, SOLUTION INTRAMUSCULAR; INTRAVENOUS
Status: DISCONTINUED | OUTPATIENT
Start: 2025-05-12 | End: 2025-05-20 | Stop reason: HOSPADM

## 2025-05-12 RX ORDER — DIAZEPAM 5 MG/1
5 TABLET ORAL
Status: DISCONTINUED | OUTPATIENT
Start: 2025-05-12 | End: 2025-05-20 | Stop reason: HOSPADM

## 2025-05-12 RX ORDER — FUROSEMIDE 10 MG/ML
40 INJECTION INTRAMUSCULAR; INTRAVENOUS 2 TIMES DAILY
Status: DISCONTINUED | OUTPATIENT
Start: 2025-05-12 | End: 2025-05-16

## 2025-05-12 RX ORDER — DIAZEPAM 10 MG/2ML
5 INJECTION, SOLUTION INTRAMUSCULAR; INTRAVENOUS
Status: DISCONTINUED | OUTPATIENT
Start: 2025-05-12 | End: 2025-05-20 | Stop reason: HOSPADM

## 2025-05-12 RX ADMIN — FUROSEMIDE 40 MG: 10 INJECTION, SOLUTION INTRAMUSCULAR; INTRAVENOUS at 17:48

## 2025-05-12 RX ADMIN — IOPAMIDOL 100 ML: 755 INJECTION, SOLUTION INTRAVENOUS at 18:20

## 2025-05-12 RX ADMIN — SODIUM CHLORIDE, PRESERVATIVE FREE 10 ML: 5 INJECTION INTRAVENOUS at 08:08

## 2025-05-12 RX ADMIN — INSULIN LISPRO 2 UNITS: 100 INJECTION, SOLUTION INTRAVENOUS; SUBCUTANEOUS at 17:48

## 2025-05-12 RX ADMIN — IPRATROPIUM BROMIDE 0.5 MG: 0.5 SOLUTION RESPIRATORY (INHALATION) at 01:14

## 2025-05-12 RX ADMIN — SULFUR HEXAFLUORIDE 2 ML: KIT at 11:31

## 2025-05-12 RX ADMIN — FUROSEMIDE 40 MG: 10 INJECTION, SOLUTION INTRAMUSCULAR; INTRAVENOUS at 11:10

## 2025-05-12 RX ADMIN — ENOXAPARIN SODIUM 105 MG: 150 INJECTION SUBCUTANEOUS at 08:08

## 2025-05-12 RX ADMIN — IPRATROPIUM BROMIDE 0.5 MG: 0.5 SOLUTION RESPIRATORY (INHALATION) at 13:23

## 2025-05-12 RX ADMIN — IPRATROPIUM BROMIDE 0.5 MG: 0.5 SOLUTION RESPIRATORY (INHALATION) at 21:01

## 2025-05-12 RX ADMIN — IPRATROPIUM BROMIDE 0.5 MG: 0.5 SOLUTION RESPIRATORY (INHALATION) at 07:43

## 2025-05-12 RX ADMIN — ENOXAPARIN SODIUM 105 MG: 150 INJECTION SUBCUTANEOUS at 22:37

## 2025-05-12 RX ADMIN — ARFORMOTEROL TARTRATE: 15 SOLUTION RESPIRATORY (INHALATION) at 20:59

## 2025-05-12 RX ADMIN — Medication 1 AMPULE: at 08:59

## 2025-05-12 RX ADMIN — ARFORMOTEROL TARTRATE: 15 SOLUTION RESPIRATORY (INHALATION) at 07:49

## 2025-05-12 RX ADMIN — LEVOFLOXACIN 750 MG: 750 TABLET, FILM COATED ORAL at 11:10

## 2025-05-12 RX ADMIN — WATER 80 MG: 1 INJECTION INTRAMUSCULAR; INTRAVENOUS; SUBCUTANEOUS at 08:08

## 2025-05-12 ASSESSMENT — PAIN SCALES - GENERAL: PAINLEVEL_OUTOF10: 0

## 2025-05-12 NOTE — PROGRESS NOTES
Spiritual Health History and Assessment/Progress Note  [facility]    Advance Care Planning,  ,  ,      Name: Jose Díaz III MRN: [id number]    Age: 59 y.o.     Sex: male   Language: English   Holiness: Unknown   Acute hypoxemic respiratory failure (HCC)     Date: 5/12/2025            Total Time Calculated: 16 min              Spiritual Assessment began in [facility] PROGRESSIVE CARE        Referral/Consult From: Rounding   Encounter Overview/Reason: Advance Care Planning  Service Provided For: Patient    [place], Belief, Meaning:   Patient has beliefs or practices that help with coping during difficult times  Family/Friends No family/friends present      Importance and Influence:  Patient has spiritual/personal beliefs that influence decisions regarding their health  Family/Friends No family/friends present    Community:  Patient feels well-supported. Support system includes: Spouse/Partner  Family/Friends No family/friends present    Assessment and Plan of Care:      attempted to meet with Mr. Díaz again before leaving CCU. He will transfer to room 2307.  explained the Advance Medical Directive paperwork to the patient and allowed time for questions and answers. Patient stated he would like a copy of the document and will discuss it with his wife.  affirmed his thoughts and advised him to contact us when he is ready.     Spiritual Health Services are available 24 hours a day as requested.         Patient Interventions include: Affirmed coping skills/support systems  Family/Friends Interventions include: No family/friends present    Patient Plan of Care: Spiritual Care available upon further referral  Family/Friends Plan of Care: Spiritual Care available upon further referral    Electronically signed by ROSALBA Telles on 5/12/2025 at 3:12 PM     Rev. DOMINGO Telles, ROSALBA  [facility]   Paging Service [phone])

## 2025-05-12 NOTE — ACP (ADVANCE CARE PLANNING)
Advance Care Planning     Advance Care Planning Inpatient Note  Spiritual Care Department    Today's Date: 5/12/2025  Unit: [facility] 2 CRITICAL CARE    Received request from HealthCare Provider.  Upon review of chart and communication with care team, patient's decision making abilities are not in question.. Patient was/were present in the room during visit.    Goals of ACP Conversation:  Unable to assess; patient asleep    Health Care Decision Makers:     No healthcare decision makers have been documented.    Summary:  No Decision Maker named by patient at this time    Advance Care Planning Documents (Patient Wishes):  Living Will/Advance Directive     Assessment:     received a consult order for an Advance Medical Directive for Mr. Díaz. Patient is asleep at this time.  will follow up at another time.    Spiritual Health Services are available 24 hours a day as requested.     Interventions:  Unable to assess    Care Preferences Communicated:   No    Outcomes/Plan:  Unable to assess    Electronically signed by ROSALBA Telles on 5/12/2025 at 2:41 PM    Rev. DOMINGO Telles, ROSALBA  [facility]   Paging Service [phone])

## 2025-05-12 NOTE — PROGRESS NOTES
Spiritual Health History and Assessment/Progress Note  [facility]    Advance Care Planning,  ,  ,      Name: Jose Díaz III MRN: [id number]    Age: 59 y.o.     Sex: male   Language: English   [employer]: Unknown   Acute hypoxemic respiratory failure (HCC)     Date: 5/12/2025            Total Time Calculated: 1 min              Spiritual Assessment began in [facility] CRITICAL CARE        Referral/Consult From: Nurse   Encounter Overview/Reason: Advance Care Planning  Service Provided For: Patient not available    Celeste, Belief, Meaning:   Patient unable to assess at this time  Family/Friends No family/friends present      Importance and Influence:  Patient unable to assess at this time  Family/Friends No family/friends present    Community:  Patient Other: Unable to assess  Family/Friends No family/friends present    Assessment and Plan of Care:      received a consult order for an Advance Medical Directive for Mr. Díaz. Patient is asleep at this time.  will follow up at another time.    Spiritual Health Services are available 24 hours a day as requested.     Patient Interventions include: Other: Unable to assess  Family/Friends Interventions include: No family/friends present    Patient Plan of Care: Spiritual Care available upon further referral  Family/Friends Plan of Care: Spiritual Care available upon further referral    Electronically signed by ROSALBA Telles on 5/12/2025 at 2:46 PM     Rev. DOMINGO Telles, ROSALBA  [facility]   Paging Service [phone])

## 2025-05-12 NOTE — CONSULTS
Pulmonary, Critical Care, and Sleep Medicine      Name: Jose Díaz III MRN: [id number]   : 1965 Hospital: [facility]   Date: 2025  Admission date: 2025 Hospital Day: 2   Patient PCP: No primary care provider on file.    History:   Pt is acutely ill and unstable. Medical records and data reviewed. Pt seen in consultation    IMPRESSION:   Acute respiratory failure with Hypoxia   Interstitial pulmonary edema  Severe COPD followed by Dr. Rashawn Hankins, PFT 2024 FEV1 1.98 (52%) TLC of 104  DLCO 89; on Trelegy and as needed albuterol; no eosinophilia; recent exacerbation treated with steroids  Tobacco use disorder ongoing but significantly reduced  EtOH use regular  Severe aortic stenosis  Cardiomyopathy LVEF 35 to 40% on echo  TAA: Ascending aorta 5.1 cm 2024  Body mass index is 32.89 kg/m².        RECOMMENDATIONS/PLAN:   Hold on IV steroid dose today but continue with prednisone taper tomorrow   Daily a.m. weights   Am afraid his COPD and his aortic stenosis are both severe.  Discussed with nursing ; patient scheduled to get his IV Lasix this evening, ordered as twice daily ; definite balancing act because of his severe aortic stenosis  Wean O2 as long as SpO2 > 90%  O2 challenge prior to discharge  Overnight oximetry while here to screen for obstructive sleep apnea  Continue equivalent of Trelegy by nebulization while in hospital resume Trelegy at home   Change albuterol as needed to levalbuterol to avoid potential tachycardia that might aggravate his cardiac condition  May need a nebulizer at home with levalbuterol as needed   DVT prophylaxis  Prescription drug management with home med reconciliation reviewed  Thanks you for asking us to see pt while in the hospital     Interval history:         [x] High complexity decision making was performed  [x] See my orders for details      Initial HPI:      I was asked by Rafa Shabazz MD to see Jose Díaz III   0700  In: 480 [P.O.:480]  Out: 1400 [Urine:1400]       Physical Exam:     General:  Alert,  male; alert and oriented times 3;  HFNC; cooperative, no distress ; sitting upright in bed, bearded   Head:  Normocephalic, without obvious abnormality, atraumatic.   Eyes:  Conjunctivae/corneas clear.     Nose: Nares normal. Septum midline. Mucosa normal.    Throat: Lips, mucosa moist ;no thrush; tongue enlarged . Oral hygiene fair; Teeth fair    Neck: Supple, symmetrical, trachea midline; no crepitus   Back:   Symmetric, no kyphosis.   Lungs:   Diminished bilaterally. no wheezes, few rales, no rhonchi   Chest wall:  No tenderness or deformity.   Heart:  Regular rate and rhythm    Abdomen:   Soft, non-tender. Bowel sounds normal; distended,    Extremities: normal, atraumatic, no cyanosis or edema.   Skin: color;  texture, turgor normal. No clubbing   Neurologic: Grossly nonfocal,  fluent            Labs:  ABG      CBC Recent Labs     05/11/25  1215 05/12/25  0324   WBC 15.4* 11.7*   HGB 14.3 13.3   HCT 44.5 42.2    134*   MCV 98.9 98.8   MCH 31.8 31.1        Metabolic  Panel Recent Labs     05/11/25  1215 05/11/25 2013 05/12/25  0324    139 140   K 4.2 4.1 4.1    104 103   CO2 34* 32 34*   BUN 15 14 15   MG 2.1  --   --    ALT 45  --  33        Pertinent Labs @LABRCNT(CPK:3,CKMB:3,TROPONINI:3,B-NP:3))@  Microbiology:  No results found for: \"SDES\"  No components found for: \"CULT\"     Xray Result (most recent): image(s) personally reviewed  Vascular duplex lower extremity venous bilateral  Narrative:   No evidence of deep vein or superficial vein thrombosis in the right   lower extremity. Vessels demonstrate normal compressibility, color   filling, and phasic and spontaneous flow.    No evidence of deep vein or superficial vein thrombosis in the left   lower extremity. Vessels demonstrate normal compressibility, color   filling, and phasic and spontaneous flow.    INDICATION: Right lower extremity

## 2025-05-12 NOTE — PROGRESS NOTES
1900- Report received from KIKE Oleary.    2013- BMP drawn and sent.    2020- Patient assessed.  See flowsheet.  Tylenol given for low grade fever.    2115- Patient's wife came to visit.  She stated that her  drank at least a 6 pack of beer a day.  Notified JACK Rondon.  CIWA currently 3.     0006- Reassessed.  No changes.    0330- Reassessed.   No changes.  Labs drawn and sent. Current CIWA is a 2.     0700- Report given to the oncoming shift.

## 2025-05-12 NOTE — CONSULTS
Resp   05/12/25 1330 16   05/12/25 1300 22   05/12/25 1230 23   05/12/25 1200 21   05/12/25 1153 19   05/12/25 1130 21   05/12/25 1100 24   05/12/25 1030 19   05/12/25 1000 16   05/12/25 0900 21   05/12/25 0800 17   05/12/25 0630 18     Patient Vitals for the past 8 hrs:   BP   05/12/25 1330 (!) 128/92   05/12/25 1300 (!) 105/90   05/12/25 1230 102/64   05/12/25 1200 114/87   05/12/25 1153 100/74   05/12/25 1130 (!) 123/98   05/12/25 1100 (!) 133/98   05/12/25 1030 (!) 122/100   05/12/25 0900 111/85   05/12/25 0800 (!) 121/97   05/12/25 0630 (!) 121/92       Intake/Output Summary (Last 24 hours) at 5/12/2025 1402  Last data filed at 5/12/2025 1239  Gross per 24 hour   Intake 1480 ml   Output 2775 ml   Net -1295 ml         Physical Exam (complete single organ system exam)    Cons: The patient is no distress. Appears stated age.   HEENT: Normal conjunctivae and palate. No xanthelasma.  Neck: Flat JVP without appreciable HJR.  Resp: Normal respiratory effort with sl wheeze/rhonchi bilaterally.   CV: Regular rate and rhythm.   PMI not palpated. Normal S1,S2  No gallop or rubs appreciated.  2/6 AS murmur appreciated.  Intact carotid upstroke bilaterally without appreciated bruits.  Abdominal aorta not palpated; no abdominal bruit noted.  Intact pedal pulses.  Min peripheral edema.  GI: No abd mass noted, soft; no organomegaly noted.  Bowel sounds present.   Muscular:  No significant kyphosis.  Strength WNL for age.  Ext: No cyanosis, clubbing, or stigmata of peripheral embolization.   Derm: No ulcers or stasis dermatitis of lower extremities.   Neuro: Alert and oriented x 3;  Grossly non-focal. Normal mood and affect.     Labs:   Recent Results (from the past 24 hours)   Troponin    Collection Time: 05/11/25  3:39 PM   Result Value Ref Range    Troponin, High Sensitivity 218 (HH) 0 - 76 ng/L   Troponin    Collection Time: 05/11/25  6:54 PM   Result Value Ref Range    Troponin, High Sensitivity 260 (HH) 0 - 76 ng/L  [place] [facility]    Echo (TTE) limited (PRN contrast/bubble/strain/3D)    Collection Time: 05/12/25 11:30 AM   Result Value Ref Range    IVSd 1.7 (A) 0.6 - 1.0 cm    LVIDd 6.4 (A) 4.2 - 5.9 cm    LVIDs 4.8 cm    LVOT Diameter 2.4 cm    LVPWd 1.7 (A) 0.6 - 1.0 cm    LVOT Peak Gradient 3 mmHg    LVOT Mean Gradient 2 mmHg    LVOT SV 77.8 ml    LVOT Peak Velocity 0.9 m/s    LVOT VTI 17.2 cm    LA Diameter 5.3 cm    AV Area by Peak Velocity 0.8 cm2    AV Area by VTI 0.7 cm2    AV Peak Gradient 99 mmHg    AV Mean Gradient 68 mmHg    AV Peak Velocity 5.0 m/s    AV Mean Velocity 4.0 m/s    AV .7 cm    Aortic Root 3.3 cm    Body Surface Area 2.36 m2    Fractional Shortening 2D 25 28 - 44 %    LVIDd Index 2.77 cm/m2    LVIDs Index 2.08 cm/m2    LV RWT Ratio 0.53     LV Mass 2D 565.6 (A) 88 - 224 g    LV Mass 2D Index 244.8 (A) 49 - 115 g/m2    LVOT Stroke Volume Index 33.7 mL/m2    LVOT Area 4.5 cm2    LA Size Index 2.29 cm/m2    LA/AO Root Ratio 1.61     Ao Root Index 1.43 cm/m2    AV Velocity Ratio 0.18     LVOT:AV VTI Index 0.15     STORM/BSA VTI 0.3 cm2/m2    STORM/BSA Peak Velocity 0.3 cm2/m2     No results for input(s): \"CPK\", \"CKMB\" in the last 72 hours.    Invalid input(s): \"CKNDX\", \"TROIQ\"    Recent Labs     05/11/25  1215 05/11/25 2013 05/12/25  0324    139 140   K 4.2 4.1 4.1    104 103   CO2 34* 32 34*   BUN 15 14 15   WBC 15.4*  --  11.7*   HGB 14.3  --  13.3   HCT 44.5  --  42.2     --  134*       Car Cooley MD

## 2025-05-12 NOTE — PROGRESS NOTES
respiratory failure  Respiratory distress  Smoker - 40+ PY history. 1 PPD currently  COPD exacerbation  Scant hemoptysis likely due to acute bronchitis - resolved   Hypoxia/dyspnea out of proportion to objective findings   LE US pending    - Supplemental O2 as needed to maintain SpO2 > 90%  - BiPAP as needed or with naps   - Wean steroids   - Cap coverage x 5 days       CARDIOVASCULAR:  Abnormal EKG - c/w LAE, LVH. T wave inversions laterally  Elevated BNP  Mildly elevated troponin  LE edema / pulmonary edema   Cardiac/hemodynamic monitoring  MAP goal > 65 mmHg  SBP goal < 160 mmHg  Elevated trop - trending down  BNP >11K  TTE ordered    - continue diuresis  - will need cardiology eval      RENAL/ELECTROLYTE/FLUIDS:  No acute issues   Monitor I/Os and Uo  Monitor renal function panel intermittently  Correct electrolytes as needed  Avoid nephrotoxins     GASTROINTESTINAL  Moderate obesity   Nutritional support: ADAT beginning in AM 05/12.   - advance diet as tolerated now respiratory status  improved     INFECTIOUS DISEASE:  Acute purulent bronchitis      Levaquin x 5 days         HEMATOLOGY/ONCOLOGY:  Asymmetric edema, concern for DVT   Follow CBC intermittently  Transfuse as needed to maintain Hgb > 7.0 or for hemodynamically significant bleeding  LE venous US ordered. Consider eval for PE  Full anticoagulation with enoxaparin until VTE fully ruled out     ENDOCRINE:  Hyperglycemia without prior dx of DM  Anticipate exacerbation of hyperglycemia with systemic steroids   Glycemic control: NPH, SSI  A1c ordered for AM 05/12     NEUROLOGICAL:  Regular alcohol use  Recreational drug use - reportedly \"occasionally\"  Anxious   Monitor for signs of withdrawal  Pain management: acetaminophen  Delirium precautions  Consider PRN phenobarbital if needed     I personally spent 00 minutes of critical care time.  This is time spent at this critically ill patient's bedside actively involved in patient care as well as the  chloride infusion   IntraVENous PRN    ondansetron (ZOFRAN-ODT) disintegrating tablet 4 mg  4 mg Oral Q8H PRN    Or    ondansetron (ZOFRAN) injection 4 mg  4 mg IntraVENous Q6H PRN    polyethylene glycol (GLYCOLAX) packet 17 g  17 g Oral Daily PRN    acetaminophen (TYLENOL) tablet 650 mg  650 mg Oral Q6H PRN    Or    acetaminophen (TYLENOL) suppository 650 mg  650 mg Rectal Q6H PRN    methylPREDNISolone sodium succ (SOLU-MEDROL) 80 mg in sterile water 1.28 mL injection  80 mg IntraVENous Q12H    glucose chewable tablet 16 g  4 tablet Oral PRN    dextrose bolus 10% 125 mL  125 mL IntraVENous PRN    Or    dextrose bolus 10% 250 mL  250 mL IntraVENous PRN    glucagon injection 1 mg  1 mg SubCUTAneous PRN    dextrose 10 % infusion   IntraVENous Continuous PRN    insulin lispro (HUMALOG,ADMELOG) injection vial 0-8 Units  0-8 Units SubCUTAneous 4x Daily AC & HS     ______________________________________________________________________  EXPECTED LENGTH OF STAY: 14  ACTUAL LENGTH OF STAY:          1                 Flori Cool MD   Pulmonary/[facility] Critical Care

## 2025-05-12 NOTE — INTERDISCIPLINARY ROUNDS
Critical care interdisciplinary rounds today.  Following members present:  Nursing, Nutrition, Pharmacy, and Physician.    Plan of care discussed.  See clinical pathway for plan of care and interventions and desired outcomes.

## 2025-05-12 NOTE — ACP (ADVANCE CARE PLANNING)
Advance Care Planning     Advance Care Planning Inpatient Note  Spiritual Care Department    Today's Date: 5/12/2025  Unit: [facility] 2 CRITICAL CARE    Received request from HealthCare Provider.  Upon review of chart and communication with care team, patient's decision making abilities are not in question.. Patient was/were present in the room during visit.    Goals of ACP Conversation:  Discuss advance care planning documents  Facilitate a discussion related to patient's goals of care as they align with the patient's values and beliefs.    Health Care Decision Makers:     No healthcare decision makers have been documented.    Summary:  No Decision Maker named by patient at this time      Advance Care Planning Documents (Patient Wishes):  Living Will/Advance Directive     Assessment:     attempted to meet with Mr. Díaz again before leaving CCU. He will transfer to room 2307.  explained the Advance Medical Directive paperwork to the patient and allowed time for questions and answers. Patient stated he would like a copy of the document and will discuss it with his wife.  affirmed his thoughts and advised him to contact us when he is ready.     Spiritual Health Services are available 24 hours a day as requested.       Interventions:  Patient declined at this time.    Care Preferences Communicated:   No    Outcomes/Plan:  ACP Discussion: Completed    Electronically signed by ROSALBA Telles on 5/12/2025 at 3:02 PM    Rev. DOMINGO Telles, [facility]   Paging Service [phone])

## 2025-05-12 NOTE — PROGRESS NOTES
End of Shift Note    Bedside shift change report given to Kanika (oncoming nurse) by MARIYA LAMB RN (offgoing nurse).  Report included the following information SBAR    Shift worked:  7a-7p     Shift summary and any significant changes:     Pt on 6L of 02, sats 90-93%     Concerns for physician to address:  oxygen     Zone phone for oncoming shift:          Activity:  Level of Assistance: Moderate assist, patient does 50-74%  Number times ambulated in hallways past shift: 1  Number of times OOB to chair past shift: 3    Cardiac:   Cardiac Monitoring: Yes      Cardiac Rhythm: Sinus rhythm    Access:  Current line(s): PIV     Genitourinary:   Urinary Status: Voiding    Respiratory:   O2 Device: Nasal cannula  Chronic home O2 use?: NO  Incentive spirometer at bedside: YES    GI:     Current diet:  ADULT DIET; Regular; 3 carb choices (45 gm/meal)  Passing flatus: YES    Pain Management:   Patient states pain is manageable on current regimen: YES    Skin:  Po Scale Score: 19  Interventions: Wound Offloading (Prevention Methods): Bed, pressure redistribution/air    Patient Safety:  Fall Risk: Nursing Judgement-Fall Risk High(Add Comments): Yes  Fall Risk Interventions  Nursing Judgement-Fall Risk High(Add Comments): Yes  Toilet Every 2 Hours-In Advance of Need: Yes  Hourly Visual Checks: Quiet, In bed  Fall Visual Posted: Armband, Socks  Room Door Open: Yes  Alarm On: Bed, Chair  Patient Moved Closer to Nursing Station: No    Active Consults:   IP CONSULT TO SPIRITUAL SERVICES  IP CONSULT TO CARDIOLOGY  IP CONSULT TO PULMONOLOGY    Length of Stay:  Expected LOS: 14  Actual LOS: 1    MARIYA LAMB, RN

## 2025-05-12 NOTE — PROGRESS NOTES
0715  Bedside and verbal report from Tara Luciano RN. Patient taken off bipap during report and placed on 6 lpm nasal cannula. Sats 88-90%    0800  Assessment completed. Sats now 95%    0900  Took diet well. Assisted out of bed to chair by PCT. Gait steady.     1000  CCU rounds with Dr. Cool and critical care team. Will get echo and lower extremity doppler studies today, add lasix, start PO antibiotics.     1045 Bedside echo in progress.     1200  Wife now at the bedside, assisted patient with bath. Reassessment completed. Took diet well.     1350  Tech at bedside for lower extremity doppler study.    1435  Patient for transfer to PCU room 2307.  Phone report to Lakisha Burden RN.     1445  Dr. Cooley here to see patient.     1500  To room 2307 via wheelchair, on monitor and oxygen @ 6 lpm nasal cannula.

## 2025-05-12 NOTE — PROGRESS NOTES
[facility] Consult Service Progress NOTE  Pulmonary, Critical Care, and Sleep Medicine    Name: Jose Díaz III MRN: [id number]   : 1965 Hospital: [facility]   Date: 2025  Admission Date: 2025     Pulmonology consult received. Chart and notes reviewed. Data reviewed.  Pt is acutely ill and is an established patient of [facility] and we appreciate the opportunity to help during this admission and to provide continuity of care. Full note to follow.            Hospital Day: 2    Kalpesh De La Cruz MD

## 2025-05-12 NOTE — H&P
Completed:Yes  Disposition: ICU  Multidisciplinary Rounds Completed: pending  Goals of Care Discussion/Palliative: N/A. Full code appropriate  Patient/Family Updated: Yes    SUBJECTIVE   Review of Systems   Constitutional:  Positive for fatigue. Negative for appetite change, chills, fever and unexpected weight change.   HENT: Negative.     Eyes: Negative.    Cardiovascular:  Positive for leg swelling. Negative for chest pain and palpitations.   Gastrointestinal: Negative.    Endocrine: Negative.    Genitourinary: Negative.    Musculoskeletal: Negative.    Skin: Negative.    Allergic/Immunologic: Negative.    Neurological: Negative.    Hematological: Negative.    Psychiatric/Behavioral: Negative.         OBJECTIVE     Vitals:    05/11/25 1833 05/11/25 1915 05/11/25 1930 05/11/25 1945   BP: (!) 123/102 (!) 117/102 (!) 121/92 (!) 110/92   Pulse: (!) 110 (!) 104 (!) 103 (!) 105   Resp: 27 20 23 22   Temp: 100.2 °F (37.9 °C)   99.9 °F (37.7 °C)   TempSrc: Oral   Axillary   SpO2: 93% 94% 93% 93%         Physical Exam  Constitutional:       General: He is in acute distress.      Appearance: He is not toxic-appearing.   HENT:      Head: Normocephalic and atraumatic.   Eyes:      General: No scleral icterus.     Extraocular Movements: Extraocular movements intact.      Pupils: Pupils are equal, round, and reactive to light.   Neck:      Comments: No JVD noted  Cardiovascular:      Rate and Rhythm: Regular rhythm. Tachycardia present.      Heart sounds: No murmur heard.     Comments: Sinus rhythm  Pulmonary:      Effort: Respiratory distress present.      Breath sounds: Wheezing present.      Comments: Distant BS  Abdominal:      General: Bowel sounds are normal. There is no distension.      Palpations: Abdomen is soft.      Tenderness: There is no abdominal tenderness. There is no guarding.   Musculoskeletal:         General: Normal range of motion.      Cervical back: Neck supple. No rigidity.      Right lower leg: Edema  present.      Left lower leg: No edema.      Comments: R ankle edema   Skin:     General: Skin is warm and dry.      Capillary Refill: Capillary refill takes less than 2 seconds.      Findings: No bruising.   Neurological:      General: No focal deficit present.   Psychiatric:         Thought Content: Thought content normal.         Labs and Data: Reviewed 05/11/25  Medications: Reviewed 05/11/25  Imaging: Reviewed 05/11/25      CXR: (I have personally reviewed)  05/11 Cardiomegaly, minimal interstitial prominence. No overt edema, no infiltrates      Intake/Output:     Intake/Output Summary (Last 24 hours) at 5/11/2025 2004  Last data filed at 5/11/2025 1908  Gross per 24 hour   Intake --   Output 200 ml   Net -200 ml         CRITICAL CARE DOCUMENTATION  I had a face to face encounter with the patient, reviewed and interpreted patient data including clinical events, labs, images, vital signs, I/O's, and examined patient.  I have discussed the case and the plan and management of the patient's care with the consulting services, the bedside nurses and the respiratory therapist.      NOTE OF PERSONAL INVOLVEMENT IN CARE   This patient has a high probability of imminent, clinically significant deterioration, which requires the highest level of preparedness to intervene urgently. I participated in the decision-making and personally managed or directed the management of the following life and organ supporting interventions that required my frequent assessment to treat or prevent imminent deterioration.    I personally spent 60 minutes of critical care time.  This is time spent at this critically ill patient's bedside actively involved in patient care as well as the coordination of care.  This does not include any procedural time which has been billed separately.    Rafa Shabazz MD   Critical Care Medicine  [facility]

## 2025-05-12 NOTE — PROGRESS NOTES
93 19 94 % -- --   05/12/25 0045 -- -- -- -- -- -- 1.829 m (6') 110 kg (242 lb 8.1 oz)   05/12/25 0030 (!) 121/90 -- -- 93 21 94 % -- --   05/12/25 0000 (!) 119/97 99.3 °F (37.4 °C) Axillary 91 19 94 % -- --   05/11/25 2330 (!) 111/91 -- -- 91 17 94 % -- --   05/11/25 2300 101/80 -- -- 92 19 93 % -- --   05/11/25 2245 (!) 116/97 -- -- 94 19 92 % -- --   05/11/25 2200 104/82 -- -- 96 21 -- -- --   05/11/25 2130 (!) 118/93 -- -- 98 21 93 % -- --   05/11/25 2100 (!) 126/104 -- -- 97 19 94 % -- --   05/11/25 2030 113/89 -- -- (!) 101 20 94 % -- --   05/11/25 2015 110/89 -- -- (!) 103 26 94 % -- --   05/11/25 1945 (!) 110/92 99.9 °F (37.7 °C) Axillary (!) 105 22 93 % -- --   05/11/25 1930 (!) 121/92 -- -- (!) 103 23 93 % -- --   05/11/25 1915 (!) 117/102 -- -- (!) 104 20 94 % -- --   05/11/25 1833 (!) 123/102 100.2 °F (37.9 °C) Oral (!) 110 27 93 % -- --         Intake/Output Summary (Last 24 hours) at 5/12/2025 1427  Last data filed at 5/12/2025 1239  Gross per 24 hour   Intake 1480 ml   Output 2775 ml   Net -1295 ml        I had a face to face encounter and independently examined this patient on 5/12/2025, as outlined below:  PHYSICAL EXAM:  General: Alert, cooperative  EENT:  EOMI. Anicteric sclerae. On o2 via nasal cannula 8 lpm   Resp:  CTA bilaterally, no wheezing or rales.  No accessory muscle use  CV:  Regular  rhythm,  trace rt le edema   GI:  Soft, Non distended, Non tender.  +Bowel sounds  Neurologic:  Alert and oriented X 3, normal speech  Psych:   Good insight. Not anxious nor agitated  Skin:  No rashes.  No jaundice    Reviewed most current lab test results and cultures  YES  Reviewed most current radiology test results   YES  Review and summation of old records today    NO  Reviewed patient's current orders and MAR    YES  PMH/SH reviewed - no change compared to H&P    Procedures: see electronic medical records for all procedures/Xrays and details which were not copied into this note but were reviewed

## 2025-05-12 NOTE — PROGRESS NOTES
I was asked by Dr. Cool with the critical care team to evaluate this patient for suspected new onset heart failure.  However, patient with no prior cardiology contact with [facility].  [place] cardiovascular specialists are on for unassigned consults today.  Please call [facility].  I updated the consult order.    Thank you for allowing us to participate in the care of this patient.  Feel free to reach back out if we could be of any further assistance.

## 2025-05-13 PROBLEM — J96.01 ACUTE RESPIRATORY FAILURE WITH HYPOXEMIA (HCC): Status: ACTIVE | Noted: 2025-05-13

## 2025-05-13 LAB
ALBUMIN SERPL-MCNC: 2.8 G/DL (ref 3.5–5)
ALBUMIN/GLOB SERPL: 0.8 (ref 1.1–2.2)
ALP SERPL-CCNC: 52 U/L (ref 45–117)
ALT SERPL-CCNC: 32 U/L (ref 12–78)
ANION GAP SERPL CALC-SCNC: 2 MMOL/L (ref 2–12)
AST SERPL-CCNC: 18 U/L (ref 15–37)
BASOPHILS # BLD: 0.01 K/UL (ref 0–0.1)
BASOPHILS NFR BLD: 0.1 % (ref 0–1)
BILIRUB SERPL-MCNC: 0.5 MG/DL (ref 0.2–1)
BUN SERPL-MCNC: 24 MG/DL (ref 6–20)
BUN/CREAT SERPL: 30 (ref 12–20)
CALCIUM SERPL-MCNC: 8.9 MG/DL (ref 8.5–10.1)
CHLORIDE SERPL-SCNC: 103 MMOL/L (ref 97–108)
CO2 SERPL-SCNC: 35 MMOL/L (ref 21–32)
CREAT SERPL-MCNC: 0.8 MG/DL (ref 0.7–1.3)
DIFFERENTIAL METHOD BLD: ABNORMAL
EOSINOPHIL # BLD: 0.02 K/UL (ref 0–0.4)
EOSINOPHIL NFR BLD: 0.2 % (ref 0–7)
ERYTHROCYTE [DISTWIDTH] IN BLOOD BY AUTOMATED COUNT: 14.6 % (ref 11.5–14.5)
GLOBULIN SER CALC-MCNC: 3.4 G/DL (ref 2–4)
GLUCOSE BLD STRIP.AUTO-MCNC: 112 MG/DL (ref 65–117)
GLUCOSE BLD STRIP.AUTO-MCNC: 136 MG/DL (ref 65–117)
GLUCOSE BLD STRIP.AUTO-MCNC: 175 MG/DL (ref 65–117)
GLUCOSE BLD STRIP.AUTO-MCNC: 91 MG/DL (ref 65–117)
GLUCOSE SERPL-MCNC: 104 MG/DL (ref 65–100)
HCT VFR BLD AUTO: 46.5 % (ref 36.6–50.3)
HGB BLD-MCNC: 14.3 G/DL (ref 12.1–17)
IMM GRANULOCYTES # BLD AUTO: 0.09 K/UL (ref 0–0.04)
IMM GRANULOCYTES NFR BLD AUTO: 0.7 % (ref 0–0.5)
LYMPHOCYTES # BLD: 0.51 K/UL (ref 0.8–3.5)
LYMPHOCYTES NFR BLD: 3.9 % (ref 12–49)
MCH RBC QN AUTO: 31.1 PG (ref 26–34)
MCHC RBC AUTO-ENTMCNC: 30.8 G/DL (ref 30–36.5)
MCV RBC AUTO: 101.1 FL (ref 80–99)
MONOCYTES # BLD: 1.13 K/UL (ref 0–1)
MONOCYTES NFR BLD: 8.6 % (ref 5–13)
NEUTS SEG # BLD: 11.33 K/UL (ref 1.8–8)
NEUTS SEG NFR BLD: 86.5 % (ref 32–75)
NRBC # BLD: 0 K/UL (ref 0–0.01)
NRBC BLD-RTO: 0 PER 100 WBC
PLATELET # BLD AUTO: 163 K/UL (ref 150–400)
PMV BLD AUTO: 9.8 FL (ref 8.9–12.9)
POTASSIUM SERPL-SCNC: 4.1 MMOL/L (ref 3.5–5.1)
PROT SERPL-MCNC: 6.2 G/DL (ref 6.4–8.2)
RBC # BLD AUTO: 4.6 M/UL (ref 4.1–5.7)
SERVICE CMNT-IMP: ABNORMAL
SERVICE CMNT-IMP: ABNORMAL
SERVICE CMNT-IMP: NORMAL
SERVICE CMNT-IMP: NORMAL
SODIUM SERPL-SCNC: 140 MMOL/L (ref 136–145)
WBC # BLD AUTO: 13.1 K/UL (ref 4.1–11.1)

## 2025-05-13 PROCEDURE — 6360000002 HC RX W HCPCS: Performed by: INTERNAL MEDICINE

## 2025-05-13 PROCEDURE — 2500000003 HC RX 250 WO HCPCS: Performed by: INTERNAL MEDICINE

## 2025-05-13 PROCEDURE — 2060000000 HC ICU INTERMEDIATE R&B

## 2025-05-13 PROCEDURE — 6360000002 HC RX W HCPCS: Performed by: STUDENT IN AN ORGANIZED HEALTH CARE EDUCATION/TRAINING PROGRAM

## 2025-05-13 PROCEDURE — 36415 COLL VENOUS BLD VENIPUNCTURE: CPT

## 2025-05-13 PROCEDURE — 94660 CPAP INITIATION&MGMT: CPT

## 2025-05-13 PROCEDURE — 2700000000 HC OXYGEN THERAPY PER DAY

## 2025-05-13 PROCEDURE — 85025 COMPLETE CBC W/AUTO DIFF WBC: CPT

## 2025-05-13 PROCEDURE — 94640 AIRWAY INHALATION TREATMENT: CPT

## 2025-05-13 PROCEDURE — 6370000000 HC RX 637 (ALT 250 FOR IP): Performed by: INTERNAL MEDICINE

## 2025-05-13 PROCEDURE — 82962 GLUCOSE BLOOD TEST: CPT

## 2025-05-13 PROCEDURE — 80053 COMPREHEN METABOLIC PANEL: CPT

## 2025-05-13 PROCEDURE — 6370000000 HC RX 637 (ALT 250 FOR IP): Performed by: STUDENT IN AN ORGANIZED HEALTH CARE EDUCATION/TRAINING PROGRAM

## 2025-05-13 RX ORDER — ENOXAPARIN SODIUM 100 MG/ML
30 INJECTION SUBCUTANEOUS 2 TIMES DAILY
Status: DISCONTINUED | OUTPATIENT
Start: 2025-05-13 | End: 2025-05-20 | Stop reason: SDUPTHER

## 2025-05-13 RX ADMIN — FUROSEMIDE 40 MG: 10 INJECTION, SOLUTION INTRAMUSCULAR; INTRAVENOUS at 08:43

## 2025-05-13 RX ADMIN — ENOXAPARIN SODIUM 30 MG: 100 INJECTION SUBCUTANEOUS at 20:42

## 2025-05-13 RX ADMIN — PANTOPRAZOLE SODIUM 40 MG: 40 TABLET, DELAYED RELEASE ORAL at 06:26

## 2025-05-13 RX ADMIN — ARFORMOTEROL TARTRATE: 15 SOLUTION RESPIRATORY (INHALATION) at 19:29

## 2025-05-13 RX ADMIN — SODIUM CHLORIDE, PRESERVATIVE FREE 10 ML: 5 INJECTION INTRAVENOUS at 08:32

## 2025-05-13 RX ADMIN — Medication 1 AMPULE: at 09:55

## 2025-05-13 RX ADMIN — ARFORMOTEROL TARTRATE: 15 SOLUTION RESPIRATORY (INHALATION) at 10:01

## 2025-05-13 RX ADMIN — PREDNISONE 20 MG: 20 TABLET ORAL at 08:30

## 2025-05-13 RX ADMIN — SODIUM CHLORIDE, PRESERVATIVE FREE 10 ML: 5 INJECTION INTRAVENOUS at 20:42

## 2025-05-13 RX ADMIN — LEVOFLOXACIN 750 MG: 750 TABLET, FILM COATED ORAL at 14:20

## 2025-05-13 RX ADMIN — IPRATROPIUM BROMIDE 0.5 MG: 0.5 SOLUTION RESPIRATORY (INHALATION) at 09:56

## 2025-05-13 RX ADMIN — IPRATROPIUM BROMIDE 0.5 MG: 0.5 SOLUTION RESPIRATORY (INHALATION) at 19:35

## 2025-05-13 RX ADMIN — FUROSEMIDE 40 MG: 10 INJECTION, SOLUTION INTRAMUSCULAR; INTRAVENOUS at 17:51

## 2025-05-13 RX ADMIN — IPRATROPIUM BROMIDE 0.5 MG: 0.5 SOLUTION RESPIRATORY (INHALATION) at 14:45

## 2025-05-13 RX ADMIN — ENOXAPARIN SODIUM 105 MG: 150 INJECTION SUBCUTANEOUS at 08:30

## 2025-05-13 NOTE — PROGRESS NOTES
End of Shift Note    Bedside shift change report given to Kanika STOKES (oncoming nurse) by Shamir Hester RN (offgoing nurse).  Report included the following information SBAR    Shift worked:  Day shift     Shift summary and any significant changes:    Pt was on 5L this morning. Now on heated high flow.      Concerns for physician to address:       Zone phone for oncoming shift:        Activity:  Level of Assistance: Standby assist, set-up cues, supervision of patient - no hands on  Number times ambulated in hallways past shift: 0  Number of times OOB to chair past shift: 0    Cardiac:   Cardiac Monitoring: Yes      Cardiac Rhythm: Sinus rhythm    Access:  Current line(s): PIV    Genitourinary:   Urinary Status: Voiding    Respiratory:   O2 Device: Heated high flow cannula  Chronic home O2 use?: NO  Incentive spirometer at bedside: YES    GI:     Current diet:  ADULT DIET; Regular; 3 carb choices (45 gm/meal)  Passing flatus: YES    Pain Management:   Patient states pain is manageable on current regimen: YES    Skin:  Po Scale Score: 20  Interventions: Wound Offloading (Prevention Methods): Bed, pressure reduction mattress, Repositioning, Pillows    Patient Safety:  Fall Risk: Nursing Judgement-Fall Risk High(Add Comments): Yes  Fall Risk Interventions  Nursing Judgement-Fall Risk High(Add Comments): Yes  Toilet Every 2 Hours-In Advance of Need: Yes  Hourly Visual Checks: Quiet, In bed, Awake  Fall Visual Posted: Armband, Socks  Room Door Open: Yes  Alarm On: Other (Comment) (bed alarm refusal)  Patient Moved Closer to Nursing Station: No    Active Consults:   IP CONSULT TO SPIRITUAL SERVICES  IP CONSULT TO CARDIOLOGY  IP CONSULT TO PULMONOLOGY  IP CONSULT TO THORACIC SURGERY    Length of Stay:  Expected LOS: 5  Actual LOS: 2    Shamir Hester RN

## 2025-05-13 NOTE — PROGRESS NOTES
Hospitalist Progress Note    NAME:   Jose Díaz III   : 1965   MRN: [id number]     Date/Time: 2025 3:41 PM  Patient PCP: No primary care provider on file.    Estimated discharge date:   Barriers: needs stable respiration , cardio , pulm , us doppler     ICU COURSE :   59 M smoker (1 PPD x approx 40 yrs) with history of COPD on Trelegy inhaler who at baseline is highly functional, employed as an . He admits to approx 2 beers per day and occasional recreational substance use. Admitted in transfer from [facility] with acute hypoxemic respiratory failure. Symptoms began approx 2 weeks ago with exertional dyspnea, cough, yellow mucus, scant hemoptysis. He was treated by his primary care provider as COPD exacerbation with prednisone and antibiotics and noted some improvement but not resolution. In the past 2-3 days he has had increasing dyspnea which progressed to rest dyspnea with orthopnea noted on the night prior to admission. In the [facility] ED, he was hypoxic and tachypneic. His CXR revealed CM with mild interstitial prominence. His EKG revealed changes consistent with LVH and T wave inversions in V5, V6. Troponin was modestly elevated in mid 200s, BNP > 10,000. He received furosemide and bronchodilators with transient improvement, then deterioration requiring NIPPV. Therefore, [facility] CCM was consulted and accepted the patient in transfer. Upon arrival to the ICU, he was placed on NC O2. Although tachypneic (RR aprox 26/min) he was able to speak in full sentences with SpO2 94% 0n 6 LPM NC. However, he desaturated significantly when sitting to side of the bed to urinate. Therefore, NIPPV was resumed. His initial exam revealed distant breath sounds with few scattered wheezes and asymmetric R>L ankle edema. Admitting diagnosis was acute hypoxic resp failure of unclear etiology and likely multifactorial - CHF, COPD exac and rule out VTE       Assessment / Plan:  Acute hypoxic respiratory failure-

## 2025-05-13 NOTE — PLAN OF CARE
Predictive Model Details          34 (Caution)  Factor Value    Calculated 5/13/2025 11:14 32% Supplemental oxygen Nasal cannula    Deterioration Index Model 28% Age 59 years old     16% Respiratory rate 20     9% WBC count abnormal (13.1 K/uL)     7% Pulse oximetry 91 %     3% BUN abnormal (24 MG/DL)     2% Pulse 91     2% Systolic 118     1% Potassium 4.1 mmol/L     0% Hematocrit 46.5 %     0% Sodium 140 mmol/L     0% Temperature 97.6 °F (36.4 °C)        Problem: Discharge Planning  Goal: Discharge to home or other facility with appropriate resources  5/13/2025 1114 by Shamir Hester RN  Outcome: Progressing  Flowsheets (Taken 5/13/2025 0815)  Discharge to home or other facility with appropriate resources:   Identify barriers to discharge with patient and caregiver   Arrange for needed discharge resources and transportation as appropriate  5/13/2025 0230 by Kanika Kumar RN  Outcome: Progressing     Problem: Skin/Tissue Integrity  Goal: Skin integrity remains intact  Description: 1.  Monitor for areas of redness and/or skin breakdown2.  Assess vascular access sites hourly3.  Every 4-6 hours minimum:  Change oxygen saturation probe site4.  Every 4-6 hours:  If on nasal continuous positive airway pressure, respiratory therapy assess nares and determine need for appliance change or resting period  Outcome: Progressing  Flowsheets (Taken 5/13/2025 0815)  Skin Integrity Remains Intact:   Monitor for areas of redness and/or skin breakdown   Assess vascular access sites hourly     Problem: Safety - Adult  Goal: Free from fall injury  5/13/2025 1114 by Shamir Hester RN  Outcome: Progressing  5/13/2025 0230 by Kanika Kumar RN  Outcome: Progressing     Problem: Respiratory - Adult  Goal: Achieves optimal ventilation and oxygenation  5/13/2025 1114 by Shamir Hester RN  Outcome: Progressing  5/13/2025 1004 by Rosamaria Rios, RT  Outcome: Progressing  Flowsheets (Taken 5/13/2025 0815 by Shamir Hester

## 2025-05-13 NOTE — CARE COORDINATION
Care Management Initial Assessment       RUR: 8% Low RUR  Readmission? No  1st IM letter given? No  1st  letter given: No     05/13/25 0858   Service Assessment   Patient Orientation Alert and Oriented;Person;Place;Situation;Self   Cognition Alert   History Provided By Patient   Primary Caregiver Self   Accompanied By/Relationship a friend   Support Systems Spouse/Significant Other  (Jacqueline Díaz (Spouse)  [phone])   Patient's Healthcare Decision Maker is: Legal Next of Kin   PCP Verified by CM Yes   Last Visit to PCP Within last 3 months   Prior Functional Level Independent in ADLs/IADLs   Current Functional Level Independent in ADLs/IADLs   Can patient return to prior living arrangement Yes   Ability to make needs known: Good   Family able to assist with home care needs: Yes   Would you like for me to discuss the discharge plan with any other family members/significant others, and if so, who? Yes  (Jacqueline Díaz (Spouse)  [phone])   Financial Resources None   Community Resources None   CM/SW Referral Disease Management Education   Social/Functional History   Lives With Spouse  (Jacqueline Díaz (Spouse)  [phone])   Type of Home House   Home Layout Two level   Home Access Ramped entrance   Home Equipment None   Active  Yes   Discharge Planning   Type of Residence House   Living Arrangements Spouse/Significant Other   Current Services Prior To Admission None   Potential Assistance Needed N/A   Patient expects to be discharged to: House         The  (CM) conducted an initial meeting with the patient , formally introducing themselves and clarifying their role in discharge planning and transitional care. Demographic and insurance details were verified for accuracy. Notably, the patient has no prior history with home health, skilled nursing facilities (SNF), or inpatient rehabilitation (IPR). Moreover, the patient is alert, able to communicate  needs effectively,  independent in

## 2025-05-13 NOTE — PROGRESS NOTES
Bedside shift change report given to KIKE Kelly and KIKE Barksdale (oncoming nurse) by KIKE Boudreaux (offgoing nurse). Report included the following information Nurse Handoff Report and Cardiac Rhythm NSR with 1st degree AV block .      0725--KIKE Barksdale will be charting on this patient.    1930--This RN agrees with KIKE Barksdale's charting.     End of Shift Note    Bedside shift change report given to KIKE Boudreaux (oncoming nurse) by Leticia García RN (offgoing nurse).  Report included the following information SBAR and Cardiac Rhythm NSR    Shift worked:  Day shift     Shift summary and any significant changes:     Patient was on 5 L NC and then was advanced to high flow at 15 L, and then had to be placed on BiPAP per Dr. De La Cruz with pulmonology. Patient now on heated high flow oxygen and oxygen saturations have been in 90's. Bed/chair alarm refusal signed and in chart.    Concerns for physician to address:  --increasing oxygen requirements  --plan from cardiology once patient's respiratory status improves   Zone phone for oncoming shift:          Activity:  Level of Assistance: Standby assist, set-up cues, supervision of patient - no hands on  Number times ambulated in hallways past shift: 0  Number of times OOB to chair past shift: 0    Cardiac:   Cardiac Monitoring: Yes      Cardiac Rhythm: Sinus rhythm    Access:  Current line(s): PIV     Genitourinary:   Urinary Status: Voiding    Respiratory:   O2 Device: Heated high flow cannula  Chronic home O2 use?: NO  Incentive spirometer at bedside:     GI:     Current diet:  ADULT DIET; Regular; 3 carb choices (45 gm/meal)  Passing flatus: YES    Pain Management:   Patient states pain is manageable on current regimen: YES    Skin:  Po Scale Score: 20  Interventions: Wound Offloading (Prevention Methods): Bed, pressure reduction mattress, Repositioning, Pillows    Patient Safety:  Fall Risk: Nursing Judgement-Fall Risk High(Add Comments): Yes  Fall Risk Interventions  Nursing  Judgement-Fall Risk High(Add Comments): Yes  Toilet Every 2 Hours-In Advance of Need: Yes  Hourly Visual Checks: Quiet, In bed, Awake  Fall Visual Posted: Armband, Socks  Room Door Open: Yes  Alarm On: Other (Comment) (bed alarm refusal)  Patient Moved Closer to Nursing Station: No    Active Consults:   IP CONSULT TO SPIRITUAL SERVICES  IP CONSULT TO CARDIOLOGY  IP CONSULT TO PULMONOLOGY  IP CONSULT TO THORACIC SURGERY    Length of Stay:  Expected LOS: 5  Actual LOS: 2    Leticia García RN

## 2025-05-13 NOTE — PLAN OF CARE
Problem: Discharge Planning  Goal: Discharge to home or other facility with appropriate resources  Outcome: Progressing     Problem: Safety - Adult  Goal: Free from fall injury  Outcome: Progressing     Problem: Respiratory - Adult  Goal: Achieves optimal ventilation and oxygenation  5/13/2025 0230 by Kanika Kumar RN  Outcome: Progressing  5/12/2025 2132 by Tawanna Betts RCP  Outcome: Progressing     Problem: Cardiovascular - Adult  Goal: Maintains optimal cardiac output and hemodynamic stability  Outcome: Progressing  Goal: Absence of cardiac dysrhythmias or at baseline  Outcome: Progressing     Problem: Gastrointestinal - Adult  Goal: Minimal or absence of nausea and vomiting  Outcome: Progressing  Goal: Maintains or returns to baseline bowel function  Outcome: Progressing  Goal: Maintains adequate nutritional intake  Outcome: Progressing

## 2025-05-13 NOTE — PROGRESS NOTES
End of Shift Note    Bedside shift change report given to Marc STOKES (oncoming nurse) by Kanika Kumar RN (offgoing nurse).  Report included the following information SBAR, Intake/Output, MAR, Recent Results, and Cardiac Rhythm NSR    Shift worked:  7p-7a     Shift summary and any significant changes:     No events overnight, refused bipap. Weaned down to 5 L.     Concerns for physician to address:       Zone phone for oncoming shift:          Activity:  Level of Assistance: Standby assist, set-up cues, supervision of patient - no hands on  Number times ambulated in hallways past shift: 0  Number of times OOB to chair past shift: 0    Cardiac:   Cardiac Monitoring: Yes      Cardiac Rhythm: Sinus rhythm    Access:  Current line(s): PIV     Genitourinary:   Urinary Status: Voiding    Respiratory:   O2 Device: Nasal cannula  Chronic home O2 use?: NO  Incentive spirometer at bedside: YES    GI:     Current diet:  ADULT DIET; Regular; 3 carb choices (45 gm/meal)  Passing flatus: YES    Pain Management:   Patient states pain is manageable on current regimen: N/A    Skin:  Po Scale Score: 20  Interventions: Wound Offloading (Prevention Methods): Pillows, Repositioning    Patient Safety:  Fall Risk: Nursing Judgement-Fall Risk High(Add Comments): Yes  Fall Risk Interventions  Nursing Judgement-Fall Risk High(Add Comments): Yes  Toilet Every 2 Hours-In Advance of Need: Yes  Hourly Visual Checks: Quiet, In bed  Fall Visual Posted: Armband, Socks  Room Door Open: Deferred to promote rest  Alarm On: Bed  Patient Moved Closer to Nursing Station: No    Active Consults:   IP CONSULT TO SPIRITUAL SERVICES  IP CONSULT TO CARDIOLOGY  IP CONSULT TO PULMONOLOGY    Length of Stay:  Expected LOS: 14  Actual LOS: 2    Kanika Kumar, RN

## 2025-05-13 NOTE — PROGRESS NOTES
Cardiology Progress Note  5/13/2025     Admit Date: 5/11/2025  Admit Diagnosis: Acute respiratory failure with hypoxemia (HCC) [J96.01]  Acute hypoxemic respiratory failure (HCC) [J96.01]  CC: none currently   Cardiac Assessment/Plan:    1) AS: severe by provided doppler on echo  2) Elevated trop (200s): LVH w/ repol changes on ecg; atypical CP/SHAY.  3) CM: EF 35-40% on echo  4) TAA: ascending Ao 5.1 cm 2/2024.     5) COPD, Sever per Dr De La Cruz; ongoing tobacco use  6) EtOH excess     Admitted w/ resp failure as noted below; improved w/ diuretics/steroids.    Rec: Needs CTA of Ao; Will need cath to eval cors after resp status improves.         Echo 5/12/25:    Left Ventricle: Reduced left ventricular systolic function with a visually estimated EF of 35 - 40%. Left ventricle size is normal. Moderately increased wall thickness. Global hypokinesis present.    Right Ventricle: Right ventricle size is normal. Normal systolic function.    Aortic Valve: Not well visualized. Calcified cusps. Mild regurgitation. Severe stenosis of the aortic valve by provided gradient. AV mean gradient is 68 mmHg. AV area by continuity VTI is 0.7 cm2.    Left Atrium: Left atrium is dilated.    Right Atrium: Right atrium is dilated.    CTA 5/12/25:  No evidence of pulmonary embolism. Bilateral lower lobe mucous plugging, parabronchial soft tissue density, bilateral lower lobe consolidation. Dilatation of the ascending thoracic aorta, measuring 4.7 cm.    5/13: no CP; less dyspnea; Eating a fast-food sausage brisket    BP 91-110s; HR 80-90s; Sinus; 91% 5L; excellent UOP  K 4.1; Cr 0.8; Alb 2.8; WBC 13; Hg 14.3.    Cardiac meds: Lovenox 105 bid; lasix 40 IV bid;     No new recs; needs improved resp status before cath.      High complexity decision making was performed  X Yes   High-risk of decompensation with multiple organ involvement X Yes       ____________________________________________________________________  Jose  \"PTP\"   No components found for: \"GLPOC\"    Current Facility-Administered Medications   Medication Dose Route Frequency    alcohol 62% (NOZIN) nasal  1 ampule  1 ampule Nasal Q12H    furosemide (LASIX) injection 40 mg  40 mg IntraVENous BID    levoFLOXacin (LEVAQUIN) tablet 750 mg  750 mg Oral Q24H    predniSONE (DELTASONE) tablet 20 mg  20 mg Oral Daily    Followed by    [START ON 5/16/2025] predniSONE (DELTASONE) tablet 15 mg  15 mg Oral Daily    Followed by    [START ON 5/19/2025] predniSONE (DELTASONE) tablet 10 mg  10 mg Oral Daily    Followed by    [START ON 5/22/2025] predniSONE (DELTASONE) tablet 5 mg  5 mg Oral Daily    diazePAM (VALIUM) tablet 5 mg  5 mg Oral Q1H PRN    Or    diazePAM (VALIUM) injection 5 mg  5 mg IntraVENous Q1H PRN    Or    diazePAM (VALIUM) tablet 10 mg  10 mg Oral Q1H PRN    Or    diazePAM (VALIUM) injection 10 mg  10 mg IntraVENous Q1H PRN    Or    diazePAM (VALIUM) tablet 15 mg  15 mg Oral Q1H PRN    Or    diazePAM (VALIUM) injection 15 mg  15 mg IntraVENous Q1H PRN    Or    diazePAM (VALIUM) tablet 20 mg  20 mg Oral Q1H PRN    Or    diazePAM (VALIUM) injection 20 mg  20 mg IntraVENous Q1H PRN    pantoprazole (PROTONIX) tablet 40 mg  40 mg Oral QAM AC    levalbuterol (XOPENEX) nebulization 0.63 mg  0.63 mg Nebulization Q8H PRN    ipratropium (ATROVENT) 0.02 % nebulizer solution 0.5 mg  0.5 mg Nebulization TID RT    arformoterol 15 mcg-budesonide 0.25 mg neb solution   Nebulization BID RT    enoxaparin (LOVENOX) injection 105 mg  1 mg/kg SubCUTAneous BID    sodium chloride flush 0.9 % injection 5-40 mL  5-40 mL IntraVENous 2 times per day    sodium chloride flush 0.9 % injection 5-40 mL  5-40 mL IntraVENous PRN    0.9 % sodium chloride infusion   IntraVENous PRN    ondansetron (ZOFRAN-ODT) disintegrating tablet 4 mg  4 mg Oral Q8H PRN    Or    ondansetron (ZOFRAN) injection 4 mg  4 mg IntraVENous Q6H PRN    polyethylene glycol (GLYCOLAX) packet 17 g  17 g Oral Daily PRN

## 2025-05-13 NOTE — PROGRESS NOTES
Occupational Therapy  Orders received and medical record reviewed.  Nurse cleared pt for OT Evaluation.  Upon arrival, pt was in bed on 6L NC, O2 sats 91%.  He reports independence at baseline, \"If I weren't here I'd be at work\".  Dr. Bermudez arrived and after discussion with pt, will discharge/sign off for OT, as pt reports no needs.  Please reconsult if needs change.  Nurse informed.  Thank you.  Time :  [phone]  [phone].

## 2025-05-13 NOTE — PROGRESS NOTES
Pulmonary, Critical Care, and Sleep Medicine      Name: Jose Díaz III MRN: [id number]   : 1965 Hospital: [facility]   Date: 2025  Admission date: 2025 Hospital Day: 3   Patient PCP: No primary care provider on file.    History:   Pt is acutely ill and unstable. Medical records and data reviewed. Pt seen in consultation    IMPRESSION:   Acute respiratory failure with Hypoxia   Interstitial pulmonary edema  Severe COPD followed by Dr. Rashawn Hankins, PFT 2024 FEV1 1.98 (52%) TLC of 104  DLCO 89; on Trelegy and as needed albuterol; no eosinophilia; recent exacerbation treated with steroids  Tobacco use disorder ongoing but significantly reduced  EtOH use regular  Cocaine abuse recent  Severe aortic stenosis  Cardiomyopathy LVEF 35 to 40% on echo  TAA: Ascending aorta 5.1 cm 2024  Body mass index is 32.89 kg/m².        RECOMMENDATIONS/PLAN:   Continue prednisone taper    Daily a.m. weights   Am afraid his COPD and his aortic stenosis are both severe.  Supplemental oxygen to keep O2 saturation greater than 90%   O2 challenge prior to discharge  As needed BiPAP for high oxygen needs or respiratory distress  Heated high flow nasal cannula oxygen as needed  Overnight oximetry while here to screen for obstructive sleep apnea  Continue equivalent of Trelegy by nebulization while in hospital resume Trelegy at home   Change albuterol as needed to levalbuterol to avoid potential tachycardia that might aggravate his cardiac condition  May need a nebulizer at home with levalbuterol as needed   If patient declines he will need ICU consult for higher level of care  DVT prophylaxis  Prescription drug management with home med reconciliation reviewed  Thanks you for asking us to see pt while in the hospital     Interval history:    2025: Desaturation this morning.  Requiring 15 L/min nasal cannula oxygen.  Discussed with nursing and respiratory therapy at bedside with  Oral, Daily **FOLLOWED BY** [START ON 5/22/2025] predniSONE (DELTASONE) tablet 5 mg, 5 mg, Oral, Daily, Flori Cool MD    diazePAM (VALIUM) tablet 5 mg, 5 mg, Oral, Q1H PRN **OR** diazePAM (VALIUM) injection 5 mg, 5 mg, IntraVENous, Q1H PRN **OR** diazePAM (VALIUM) tablet 10 mg, 10 mg, Oral, Q1H PRN **OR** diazePAM (VALIUM) injection 10 mg, 10 mg, IntraVENous, Q1H PRN **OR** diazePAM (VALIUM) tablet 15 mg, 15 mg, Oral, Q1H PRN **OR** diazePAM (VALIUM) injection 15 mg, 15 mg, IntraVENous, Q1H PRN **OR** diazePAM (VALIUM) tablet 20 mg, 20 mg, Oral, Q1H PRN **OR** diazePAM (VALIUM) injection 20 mg, 20 mg, IntraVENous, Q1H PRN, Anastacio Jo MD    pantoprazole (PROTONIX) tablet 40 mg, 40 mg, Oral, QAM AC, Anastacio Jo MD, 40 mg at 05/13/25 0626    levalbuterol (XOPENEX) nebulization 0.63 mg, 0.63 mg, Nebulization, Q8H PRN, Kalpesh De La Cruz MD    ipratropium (ATROVENT) 0.02 % nebulizer solution 0.5 mg, 0.5 mg, Nebulization, TID RT, Anastacio Jo MD, 0.5 mg at 05/13/25 0956    arformoterol 15 mcg-budesonide 0.25 mg neb solution, , Nebulization, BID RT, Rafa Shabazz MD, Given at 05/13/25 1001    enoxaparin (LOVENOX) injection 105 mg, 1 mg/kg, SubCUTAneous, BID, Rafa Shabazz MD, 105 mg at 05/13/25 0830    sodium chloride flush 0.9 % injection 5-40 mL, 5-40 mL, IntraVENous, 2 times per day, Rafa Shabazz MD, 10 mL at 05/13/25 0832    sodium chloride flush 0.9 % injection 5-40 mL, 5-40 mL, IntraVENous, PRN, Rafa Shabazz MD    0.9 % sodium chloride infusion, , IntraVENous, PRN, Rafa Shabazz MD    ondansetron (ZOFRAN-ODT) disintegrating tablet 4 mg, 4 mg, Oral, Q8H PRN **OR** ondansetron (ZOFRAN) injection 4 mg, 4 mg, IntraVENous, Q6H PRN, Rafa Shabazz MD    polyethylene glycol (GLYCOLAX) packet 17 g, 17 g, Oral, Daily PRN, Rafa Shabazz MD    acetaminophen (TYLENOL) tablet 650 mg, 650 mg, Oral, Q6H PRN **OR** acetaminophen (TYLENOL) suppository 650 mg, 650 mg, Rectal,

## 2025-05-14 ENCOUNTER — APPOINTMENT (OUTPATIENT)
Facility: HOSPITAL | Age: 60
DRG: 189 | End: 2025-05-14
Attending: INTERNAL MEDICINE
Payer: COMMERCIAL

## 2025-05-14 PROBLEM — I65.23 BILATERAL CAROTID ARTERY STENOSIS: Status: ACTIVE | Noted: 2025-05-14

## 2025-05-14 PROBLEM — Z01.810 PREOP CARDIOVASCULAR EXAM: Status: ACTIVE | Noted: 2025-05-14

## 2025-05-14 LAB
ALBUMIN SERPL-MCNC: 2.9 G/DL (ref 3.5–5)
ALBUMIN/GLOB SERPL: 0.8 (ref 1.1–2.2)
ALP SERPL-CCNC: 52 U/L (ref 45–117)
ALT SERPL-CCNC: 31 U/L (ref 12–78)
ANION GAP SERPL CALC-SCNC: 5 MMOL/L (ref 2–12)
APPEARANCE UR: CLEAR
ARTERIAL PATENCY WRIST A: ABNORMAL
AST SERPL-CCNC: 20 U/L (ref 15–37)
BACTERIA URNS QL MICRO: NEGATIVE /HPF
BASE EXCESS BLDA CALC-SCNC: 7.6 MMOL/L
BASOPHILS # BLD: 0.02 K/UL (ref 0–0.1)
BASOPHILS NFR BLD: 0.3 % (ref 0–1)
BDY SITE: ABNORMAL
BILIRUB SERPL-MCNC: 0.8 MG/DL (ref 0.2–1)
BILIRUB UR QL: NEGATIVE
BUN SERPL-MCNC: 26 MG/DL (ref 6–20)
BUN/CREAT SERPL: 31 (ref 12–20)
CALCIUM SERPL-MCNC: 9 MG/DL (ref 8.5–10.1)
CHLORIDE SERPL-SCNC: 102 MMOL/L (ref 97–108)
CO2 SERPL-SCNC: 33 MMOL/L (ref 21–32)
COLOR UR: NORMAL
CREAT SERPL-MCNC: 0.83 MG/DL (ref 0.7–1.3)
DIFFERENTIAL METHOD BLD: ABNORMAL
ECHO BSA: 2.36 M2
EOSINOPHIL # BLD: 0.22 K/UL (ref 0–0.4)
EOSINOPHIL NFR BLD: 3 % (ref 0–7)
EPITH CASTS URNS QL MICRO: NORMAL /LPF
ERYTHROCYTE [DISTWIDTH] IN BLOOD BY AUTOMATED COUNT: 14.3 % (ref 11.5–14.5)
FIO2 ON VENT: 60 %
GAS FLOW.O2 O2 DELIVERY SYS: 40 L/MIN
GLOBULIN SER CALC-MCNC: 3.6 G/DL (ref 2–4)
GLUCOSE BLD STRIP.AUTO-MCNC: 109 MG/DL (ref 65–117)
GLUCOSE BLD STRIP.AUTO-MCNC: 119 MG/DL (ref 65–117)
GLUCOSE BLD STRIP.AUTO-MCNC: 135 MG/DL (ref 65–117)
GLUCOSE BLD STRIP.AUTO-MCNC: 141 MG/DL (ref 65–117)
GLUCOSE SERPL-MCNC: 98 MG/DL (ref 65–100)
GLUCOSE UR STRIP.AUTO-MCNC: NEGATIVE MG/DL
HCO3 BLDA-SCNC: 34 MMOL/L (ref 22–26)
HCT VFR BLD AUTO: 49 % (ref 36.6–50.3)
HGB BLD-MCNC: 15.3 G/DL (ref 12.1–17)
HGB UR QL STRIP: NEGATIVE
HYALINE CASTS URNS QL MICRO: NORMAL /LPF (ref 0–2)
IMM GRANULOCYTES # BLD AUTO: 0.02 K/UL (ref 0–0.04)
IMM GRANULOCYTES NFR BLD AUTO: 0.3 % (ref 0–0.5)
INR PPP: 1 (ref 0.9–1.1)
KETONES UR QL STRIP.AUTO: NEGATIVE MG/DL
LEUKOCYTE ESTERASE UR QL STRIP.AUTO: NEGATIVE
LYMPHOCYTES # BLD: 0.75 K/UL (ref 0.8–3.5)
LYMPHOCYTES NFR BLD: 10.2 % (ref 12–49)
MAGNESIUM SERPL-MCNC: 2.2 MG/DL (ref 1.6–2.4)
MCH RBC QN AUTO: 30.8 PG (ref 26–34)
MCHC RBC AUTO-ENTMCNC: 31.2 G/DL (ref 30–36.5)
MCV RBC AUTO: 98.8 FL (ref 80–99)
MONOCYTES # BLD: 0.81 K/UL (ref 0–1)
MONOCYTES NFR BLD: 10.9 % (ref 5–13)
NEUTS SEG # BLD: 5.57 K/UL (ref 1.8–8)
NEUTS SEG NFR BLD: 75.3 % (ref 32–75)
NITRITE UR QL STRIP.AUTO: NEGATIVE
NRBC # BLD: 0 K/UL (ref 0–0.01)
NRBC BLD-RTO: 0 PER 100 WBC
NT PRO BNP: 4408 PG/ML
PCO2 BLDA: 55 MMHG (ref 35–45)
PH BLDA: 7.41 (ref 7.35–7.45)
PH UR STRIP: 8 (ref 5–8)
PLATELET # BLD AUTO: 187 K/UL (ref 150–400)
PMV BLD AUTO: 10 FL (ref 8.9–12.9)
PO2 BLDA: 66 MMHG (ref 80–100)
POTASSIUM SERPL-SCNC: 3.8 MMOL/L (ref 3.5–5.1)
PROT SERPL-MCNC: 6.5 G/DL (ref 6.4–8.2)
PROT UR STRIP-MCNC: NEGATIVE MG/DL
PROTHROMBIN TIME: 10.8 SEC (ref 9.2–11.2)
RBC # BLD AUTO: 4.96 M/UL (ref 4.1–5.7)
RBC #/AREA URNS HPF: NORMAL /HPF (ref 0–5)
RBC MORPH BLD: ABNORMAL
SAO2 % BLD: 93 % (ref 92–97)
SAO2% DEVICE SAO2% SENSOR NAME: ABNORMAL
SERVICE CMNT-IMP: ABNORMAL
SERVICE CMNT-IMP: NORMAL
SODIUM SERPL-SCNC: 140 MMOL/L (ref 136–145)
SP GR UR REFRACTOMETRY: 1.01
SPECIMEN SITE: ABNORMAL
TSH SERPL DL<=0.05 MIU/L-ACNC: 0.86 UIU/ML (ref 0.36–3.74)
URINE CULTURE IF INDICATED: NORMAL
UROBILINOGEN UR QL STRIP.AUTO: 1 EU/DL (ref 0.2–1)
VAS LEFT CCA DIST EDV: 16.9 CM/S
VAS LEFT CCA DIST PSV: 39.6 CM/S
VAS LEFT CCA PROX EDV: 11.2 CM/S
VAS LEFT CCA PROX PSV: 44.3 CM/S
VAS LEFT ECA EDV: 12.4 CM/S
VAS LEFT ECA PSV: 43.8 CM/S
VAS LEFT ICA DIST EDV: 20.2 CM/S
VAS LEFT ICA DIST PSV: 31.6 CM/S
VAS LEFT ICA MID EDV: 15.9 CM/S
VAS LEFT ICA MID PSV: 29.8 CM/S
VAS LEFT ICA PROX EDV: 15 CM/S
VAS LEFT ICA PROX PSV: 43.3 CM/S
VAS LEFT ICA/CCA PSV: 1.1 NO UNITS
VAS LEFT SUBCLAVIAN PROX EDV: 0 CM/S
VAS LEFT SUBCLAVIAN PROX PSV: 49.3 CM/S
VAS LEFT VERTEBRAL EDV: 15.1 CM/S
VAS LEFT VERTEBRAL PSV: 32.9 CM/S
VAS RIGHT CCA DIST EDV: 18.8 CM/S
VAS RIGHT CCA DIST PSV: 52 CM/S
VAS RIGHT CCA PROX EDV: 16.5 CM/S
VAS RIGHT CCA PROX PSV: 48.8 CM/S
VAS RIGHT ECA EDV: 12.2 CM/S
VAS RIGHT ECA PSV: 53.8 CM/S
VAS RIGHT ICA DIST EDV: 27.8 CM/S
VAS RIGHT ICA DIST PSV: 56.6 CM/S
VAS RIGHT ICA MID EDV: 19.1 CM/S
VAS RIGHT ICA MID PSV: 38.3 CM/S
VAS RIGHT ICA PROX EDV: 13.9 CM/S
VAS RIGHT ICA PROX PSV: 37.4 CM/S
VAS RIGHT ICA/CCA PSV: 1.1 NO UNITS
VAS RIGHT VERTEBRAL EDV: 15.3 CM/S
VAS RIGHT VERTEBRAL PSV: 38 CM/S
WBC # BLD AUTO: 7.4 K/UL (ref 4.1–11.1)
WBC URNS QL MICRO: NORMAL /HPF (ref 0–4)

## 2025-05-14 PROCEDURE — 85610 PROTHROMBIN TIME: CPT

## 2025-05-14 PROCEDURE — 2060000000 HC ICU INTERMEDIATE R&B

## 2025-05-14 PROCEDURE — 93880 EXTRACRANIAL BILAT STUDY: CPT

## 2025-05-14 PROCEDURE — 36600 WITHDRAWAL OF ARTERIAL BLOOD: CPT

## 2025-05-14 PROCEDURE — 81001 URINALYSIS AUTO W/SCOPE: CPT

## 2025-05-14 PROCEDURE — 84443 ASSAY THYROID STIM HORMONE: CPT

## 2025-05-14 PROCEDURE — 94660 CPAP INITIATION&MGMT: CPT

## 2025-05-14 PROCEDURE — 85025 COMPLETE CBC W/AUTO DIFF WBC: CPT

## 2025-05-14 PROCEDURE — 6360000002 HC RX W HCPCS: Performed by: INTERNAL MEDICINE

## 2025-05-14 PROCEDURE — 82962 GLUCOSE BLOOD TEST: CPT

## 2025-05-14 PROCEDURE — 83735 ASSAY OF MAGNESIUM: CPT

## 2025-05-14 PROCEDURE — 99223 1ST HOSP IP/OBS HIGH 75: CPT

## 2025-05-14 PROCEDURE — 6360000002 HC RX W HCPCS: Performed by: STUDENT IN AN ORGANIZED HEALTH CARE EDUCATION/TRAINING PROGRAM

## 2025-05-14 PROCEDURE — 2500000003 HC RX 250 WO HCPCS: Performed by: INTERNAL MEDICINE

## 2025-05-14 PROCEDURE — 82803 BLOOD GASES ANY COMBINATION: CPT

## 2025-05-14 PROCEDURE — 6370000000 HC RX 637 (ALT 250 FOR IP): Performed by: STUDENT IN AN ORGANIZED HEALTH CARE EDUCATION/TRAINING PROGRAM

## 2025-05-14 PROCEDURE — 83880 ASSAY OF NATRIURETIC PEPTIDE: CPT

## 2025-05-14 PROCEDURE — 36415 COLL VENOUS BLD VENIPUNCTURE: CPT

## 2025-05-14 PROCEDURE — 80053 COMPREHEN METABOLIC PANEL: CPT

## 2025-05-14 PROCEDURE — 94640 AIRWAY INHALATION TREATMENT: CPT

## 2025-05-14 PROCEDURE — 6370000000 HC RX 637 (ALT 250 FOR IP): Performed by: INTERNAL MEDICINE

## 2025-05-14 PROCEDURE — 93880 EXTRACRANIAL BILAT STUDY: CPT | Performed by: PSYCHIATRY & NEUROLOGY

## 2025-05-14 PROCEDURE — 2700000000 HC OXYGEN THERAPY PER DAY

## 2025-05-14 PROCEDURE — 93922 UPR/L XTREMITY ART 2 LEVELS: CPT

## 2025-05-14 RX ORDER — IOPAMIDOL 755 MG/ML
100 INJECTION, SOLUTION INTRAVASCULAR
Status: CANCELLED | OUTPATIENT
Start: 2025-05-14

## 2025-05-14 RX ORDER — IOPAMIDOL 755 MG/ML
100 INJECTION, SOLUTION INTRAVASCULAR
Status: COMPLETED | OUTPATIENT
Start: 2025-05-14 | End: 2025-05-15

## 2025-05-14 RX ADMIN — ARFORMOTEROL TARTRATE: 15 SOLUTION RESPIRATORY (INHALATION) at 19:45

## 2025-05-14 RX ADMIN — PANTOPRAZOLE SODIUM 40 MG: 40 TABLET, DELAYED RELEASE ORAL at 06:16

## 2025-05-14 RX ADMIN — Medication 1 AMPULE: at 08:49

## 2025-05-14 RX ADMIN — IPRATROPIUM BROMIDE 0.5 MG: 0.5 SOLUTION RESPIRATORY (INHALATION) at 07:15

## 2025-05-14 RX ADMIN — PREDNISONE 20 MG: 20 TABLET ORAL at 08:50

## 2025-05-14 RX ADMIN — FUROSEMIDE 40 MG: 10 INJECTION, SOLUTION INTRAMUSCULAR; INTRAVENOUS at 17:13

## 2025-05-14 RX ADMIN — SODIUM CHLORIDE, PRESERVATIVE FREE 10 ML: 5 INJECTION INTRAVENOUS at 08:51

## 2025-05-14 RX ADMIN — ENOXAPARIN SODIUM 30 MG: 100 INJECTION SUBCUTANEOUS at 08:49

## 2025-05-14 RX ADMIN — ENOXAPARIN SODIUM 30 MG: 100 INJECTION SUBCUTANEOUS at 20:29

## 2025-05-14 RX ADMIN — FUROSEMIDE 40 MG: 10 INJECTION, SOLUTION INTRAMUSCULAR; INTRAVENOUS at 08:50

## 2025-05-14 RX ADMIN — IPRATROPIUM BROMIDE 0.5 MG: 0.5 SOLUTION RESPIRATORY (INHALATION) at 13:25

## 2025-05-14 RX ADMIN — IPRATROPIUM BROMIDE 0.5 MG: 0.5 SOLUTION RESPIRATORY (INHALATION) at 19:40

## 2025-05-14 RX ADMIN — ARFORMOTEROL TARTRATE: 15 SOLUTION RESPIRATORY (INHALATION) at 07:21

## 2025-05-14 RX ADMIN — LEVOFLOXACIN 750 MG: 750 TABLET, FILM COATED ORAL at 14:28

## 2025-05-14 NOTE — PROGRESS NOTES
Cardiology Progress Note  5/14/2025     Admit Date: 5/11/2025  Admit Diagnosis: Acute respiratory failure with hypoxemia (HCC) [J96.01]  Acute hypoxemic respiratory failure (HCC) [J96.01]  CC: none currently   Cardiac Assessment/Plan:    1) AS: severe by provided doppler on echo, but difficult to see AoV  2) Elevated trop (200s): LVH w/ repol changes on ecg; atypical CP/SHAY.  3) CM: EF 35-40% on echo  4) TAA: ascending Ao 5.1 cm 2/2024. 4.7 5/2025     5) COPD, Sever babatunde De La Cruz; ongoing tobacco use  6) EtOH excess     Admitted w/ resp failure as noted below; improved w/ diuretics/steroids.    Rec: Needs CTA of Ao; Will need cath to eval cors after resp status improves.         Echo 5/12/25:    Left Ventricle: Reduced left ventricular systolic function with a visually estimated EF of 35 - 40%. Left ventricle size is normal. Moderately increased wall thickness. Global hypokinesis present.    Right Ventricle: Right ventricle size is normal. Normal systolic function.    Aortic Valve: Not well visualized. Calcified cusps. Mild regurgitation. Severe stenosis of the aortic valve by provided gradient. AV mean gradient is 68 mmHg. AV area by continuity VTI is 0.7 cm2.    Left Atrium: Left atrium is dilated.    Right Atrium: Right atrium is dilated.    CTA 5/12/25:  No evidence of pulmonary embolism. Bilateral lower lobe mucous plugging, parabronchial soft tissue density, bilateral lower lobe consolidation. Dilatation of the ascending thoracic aorta, measuring 4.7 cm.    5/13: no CP; less dyspnea; Eating a fast-food sausage brisket    BP 91-110s; HR 80-90s; Sinus; 91% 5L; excellent UOP  K 4.1; Cr 0.8; Alb 2.8; WBC 13; Hg 14.3.    5/14:  No CP; no change in dyspnea despite marked increase O2 requirement  BP ; HR; 70-90s; sinus; 91% 40L; excellent UOP  Hg 15.3; Chem pending    Cardiac meds: Lovenox 105 bid; lasix 40 IV bid;     No new recs; needs improved resp status before cath.      High complexity

## 2025-05-14 NOTE — PROGRESS NOTES
Pulmonary, Critical Care, and Sleep Medicine      Name: Jose Díaz III MRN: [id number]   : 1965 Hospital: [facility]   Date: 2025  Admission date: 2025 Hospital Day: 4   Patient PCP: No primary care provider on file.    History:   Pt is acutely ill and unstable. Medical records and data reviewed. Pt seen in consultation    IMPRESSION:   Acute respiratory failure with Hypoxia   Severe COPD followed by Dr. Rashawn Hankins, PFT 2024 FEV1 1.98 (52%) TLC of 104  DLCO 89; on Trelegy and as needed albuterol; no eosinophilia; recent exacerbation treated with steroids  Tobacco use disorder ongoing but significantly reduced  Acute on chronic HFrEF 35-45%  Severe aortic stenosis  EtOH use regular  Cocaine abuse recent  TAA: Ascending aorta 5.1 cm 2024  Body mass index is 29.5 kg/m².        RECOMMENDATIONS/PLAN:   Continue prednisone taper    Empiric levofloxacin   Daily a.m. weights   Continue HHFNC to keep O2 saturation greater than 88%   O2 challenge prior to discharge  As needed BiPAP for high oxygen needs or respiratory distress  Overnight oximetry while here to screen for obstructive sleep apnea  Continue equivalent of Trelegy by nebulization while in hospital resume Trelegy at home   Change albuterol as needed to levalbuterol to avoid potential tachycardia that might aggravate his cardiac condition  May need a nebulizer at home with levalbuterol as needed   IV diuresis   Cardiology following  Cardiothoracic surgery following   DVT prophylaxis  Prescription drug management with home med reconciliation reviewed  Thanks you for asking us to see pt while in the hospital    Pt critically ill requiring HHFNC. If patient declines he will need ICU consult for higher level of care. CC time 38 minutes.     Interval history:     Pt seen this morning sitting at edge of bed. States he is feeling better today although requiring more oxygen. Has had increased urine  evidence of bilateral lower extremity deep venous   thrombosis.  Echo (TTE) limited (PRN contrast/bubble/strain/3D)    Left Ventricle: Reduced left ventricular systolic function with a   visually estimated EF of 35 - 40%. Left ventricle size is normal.   Moderately increased wall thickness. Global hypokinesis present.    Right Ventricle: Right ventricle size is normal. Normal systolic   function.    Aortic Valve: Not well visualized. Calcified cusps. Mild regurgitation.   Severe stenosis of the aortic valve by provided gradient. AV mean gradient   is 68 mmHg. AV area by continuity VTI is 0.7 cm2.    Left Atrium: Left atrium is dilated.    Right Atrium: Right atrium is dilated.  XR CHEST PORTABLE  Narrative: EXAM: XR CHEST PORTABLE  ACC#: [id number]     INDICATION: F/U CHF, []     COMPARISON: Chest radiograph May 11, 2005    FINDINGS: AP portable chest radiograph. Heart size normal. The lungs are  adequately expanded. No airspace consolidation is seen. Vascular clarity is  normal. There is no evidence of effusion or pneumothorax.  Impression: No acute cardiopulmonary findings.    Electronically signed by Benjy Shipley    CTA CHEST W WO CONTRAST   Final Result   No evidence of pulmonary embolism. Bilateral lower lobe mucous plugging,   parabronchial soft tissue density, bilateral lower lobe consolidation.   Dilatation of the ascending thoracic aorta, measuring 4.7 cm.         Electronically signed by LAZARO MC      Vascular duplex lower extremity venous bilateral   Final Result    No evidence of bilateral lower extremity deep venous    thrombosis.         XR CHEST PORTABLE   Final Result   No acute cardiopulmonary findings.            Electronically signed by Benjy Shipley      Vascular ankle brachial index (PATRICIO)    (Results Pending)   Vascular duplex carotid bilateral    (Results Pending)         I personally reviewed laboratory testing, pulmonary imaging, radiology reports, and pulse oximetry data.    Please note

## 2025-05-14 NOTE — PROGRESS NOTES
End of Shift Note    Bedside shift change report given to KIKE Boudreaux (oncoming nurse) by Kerline Tse RN (offgoing nurse).  Report included the following information SBAR, Intake/Output, MAR, Med Rec Status, Cardiac Rhythm NSR, and Alarm Parameters     Shift worked:  2505-5832     Shift summary and any significant changes:    Patient stable through out RN shift. No new changes, US carotid and lower extremities completed. CT scan not completed- patient on too much oxygen for safe transfer.   Concerns for physician to address: No new complaints at this time     Zone phone for oncoming shift:  [phone]        Activity:  Level of Assistance: Standby assist, set-up cues, supervision of patient - no hands on  Number times ambulated in hallways past shift: 0  Number of times OOB to chair past shift: 0  Left room for hours for imaging    Cardiac:   Cardiac Monitoring: Yes      Cardiac Rhythm: Sinus rhythm    Access:  Current line(s): PIV     Genitourinary:   Urinary Status: Voiding, Bathroom privileges    Respiratory:   O2 Device: Heated high flow cannula  Chronic home O2 use?: NO  Incentive spirometer at bedside: NO    GI:  Last BM (including prior to admit): 05/14/25  Current diet:  ADULT DIET; Regular; 3 carb choices (45 gm/meal)  Passing flatus: YES    Pain Management:   Patient states pain is manageable on current regimen: YES    Skin:  Po Scale Score: 20  Interventions: Wound Offloading (Prevention Methods): Bed, pressure reduction mattress, Repositioning, Pillows    Patient Safety:  Fall Risk: Nursing Judgement-Fall Risk High(Add Comments): Yes  Fall Risk Interventions  Nursing Judgement-Fall Risk High(Add Comments): Yes  Toilet Every 2 Hours-In Advance of Need: Yes  Hourly Visual Checks: In bed, Awake  Fall Visual Posted: Armband, Socks  Room Door Open: Yes  Alarm On: Other (Comment) (refusal sheet signed)  Patient Moved Closer to Nursing Station: No    Active Consults:   IP CONSULT TO SPIRITUAL SERVICES  IP

## 2025-05-14 NOTE — CONSULTS
[facility]   History and Physical    Subjective:      Jose Díaz III is a 59 y.o. male with a past medical history of severe COPD, active tobacco. Etoh and cocaine use, who was referred for cardiac evaluation by Dr. Bermudez for severe AS and dilation of ascending thoracic aorta of 4.7 cm.     Patient presented to [facility] 5/11/25 with CC SOB that had been gradually worsening over the prior week and suddenly worse the day of presentation. This SOB occurred at rest but was worse with activity. Reports orthopnea associated with this but denies PND. He had leftover steroids and antibiotics from a prior COPD- he took cefdinir the day prior with no relief.  Patient was hypertensive, tachycardia, tachypnic and hypoxic upon arrival. Rales were noted upon exam. EKG showed NSR. WBC elevated at 15.4. Glucose 135. Lactic acid 1.3. HS trop 202/216/218. NT pro BNP 10,182. Negative for flu and COVID. Pattern of mild pulmonary interstitial edema on XR. He received furosemide and bronchodilators with initial improvement. Patient was accepted for transfer to [facility] service for further evaluation and management. Shortly after patient accepted to [facility], patient became diaphoretic and tachypnic. Bipap initiated and patient accepted to ICU instead. He was given steroids and CAP coverage. Diuresis was continued. Cardiology was consulted. Downgraded to floor on 5/12 and pulmonary brought on board as well.      In addition to above, has had a stabbing, positional chest pain to the left side of his chest for \"years\", which goes away with sitting up. He's had intermittent issues with swelling to his right ankle \"a couple times before but it's always gone away\". He's very active for work, but has noticed decreased exercise tolerance/stamina over the past year, which he has attributed to aging. . Denies syncope, dizziness, palpitations, and claudication.    Denies CVA/TIA, difficulty swallowing, REYNALDO , thyroid disease, dysrhythmias, prior PNA,  likely steroid induced: Resolved.   Tobacco abuse: Advise cessation. Nicotine patch as needed.   Alcohol abuse: Advise cessation. Monitor for withdrawal and initiate CIWA protocol if needed.   Cocaine use: Advise cessation.       I spent a total of 80 minutes chart reviewing, interviewing patient, assessing patient, as well as devising and documenting a plan of care.     Signed By: BENJAMIN Gamboa - JACK     May 14, 2025        Addendum    59-year-old male with COPD tobacco and ethanol use.  Admitted with severe shortness of breath.  Now better after diuresis.  Found to have severe aortic stenosis and a 4.7 cm ascending aortic aneurysm.  EF is 35%.  Patient will need a cath to evaluate coronary arteries and a TAVR CT scan for further evaluation and of the aortic valve.  Will make decision about surgical options after this.

## 2025-05-14 NOTE — PROGRESS NOTES
needing bipap   COPD exacerbation  Smoker - 40+ PY history. 1 PPD currently  Scant hemoptysis likely due to acute bronchitis  Hypoxia/dyspnea out of proportion to objective findings  Patient initially admitted to ICU   CXR ON 5/11-mild interstitial edema >> repeat on 5/12 - no abnormalities   Procal - 0.10  Continuous pulse oximetry monitoring  Supplemental O2 as needed to maintain SpO2 > 90%  BiPAP as needed  S/p IV Solu-Medrol now on p.o. prednisone  Nebulized steroids and bronchodilators  Spoke with pulmonology and cardiology  Plan is to do cardiac cath once his respiratory status improved  Still on heated high flow 45 L  Continue with IV Lasix, oral Levaquin  Patient oxygen requirement trending down to 35 L  2D echo showed EF of 35%, severe AS  Discussed with the CT surgery NP plan for valve repair plus possible aortic aneurysm repair  Patient needed cardiac cath first once her respiratory status improved  Abnormal EKG - c/w LAE, LVH. T wave inversions laterally  Elevated BNP  Mildly elevated troponin  LE edema  Bnp 85455  Trop - 218>>260>>180  Cardiac/hemodynamic monitoring  MAP goal > 65 mmHg  SBP goal < 160 mmHg  Continue lasix 40 mg iv bid   TTE ordered, showed EF of 35%  Cardiology consulted for possible chf     Asymmetric edema, concern for DVT   Follow CBC intermittently  Transfuse as needed to maintain Hgb > 7.0 or for hemodynamically significant bleeding  Lower extremity edema negative for DVT.  Will discontinue Lovenox  CTA negative for PE  No evidence of pulmonary embolism. Bilateral lower lobe mucous plugging,  parabronchial soft tissue density, bilateral lower lobe consolidation.  Dilatation of the ascending thoracic aorta, measuring 4.7 cm.  Might need repair once respiratory status better    Hyperglycemia without prior dx of DM  Anticipate exacerbation of hyperglycemia with systemic steroids  Glycemic control: NPH, SSI  A1c - 5.2  Continue insulin while on steroid .    Regular alcohol  91 16 98 % --   05/13/25 1445 -- -- -- 91 18 97 % --         Intake/Output Summary (Last 24 hours) at 5/14/2025 1439  Last data filed at 5/14/2025 1215  Gross per 24 hour   Intake 440 ml   Output 2950 ml   Net -2510 ml        I had a face to face encounter and independently examined this patient on 5/14/2025, as outlined below:  PHYSICAL EXAM:  General: Alert, cooperative  EENT:  EOMI. Anicteric sclerae. On o2 via nasal cannula 8 lpm   Resp:  Bilateral wheezing  CV:  Regular  rhythm,  trace rt le edema   GI:  Soft, Non distended, Non tender.  +Bowel sounds  Neurologic:  Alert and oriented X 3, normal speech  Psych:   Good insight. Not anxious nor agitated  Skin:  No rashes.  No jaundice    Reviewed most current lab test results and cultures  YES  Reviewed most current radiology test results   YES  Review and summation of old records today    NO  Reviewed patient's current orders and MAR    YES  PMH/SH reviewed - no change compared to H&P    Procedures: see electronic medical records for all procedures/Xrays and details which were not copied into this note but were reviewed prior to creation of Plan.      LABS:  I reviewed today's most current labs and imaging studies.  Pertinent labs include:  Recent Labs     05/12/25  0324 05/13/25  0502 05/14/25  0646   WBC 11.7* 13.1* 7.4   HGB 13.3 14.3 15.3   HCT 42.2 46.5 49.0   * 163 187     Recent Labs     05/12/25  0324 05/13/25  0502 05/14/25  0646    140 140   K 4.1 4.1 3.8    103 102   CO2 34* 35* 33*   GLUCOSE 141* 104* 98   BUN 15 24* 26*   CREATININE 0.73 0.80 0.83   CALCIUM 8.4* 8.9 9.0   BILITOT 0.7 0.5 0.8   AST 22 18 20   ALT 33 32 31       Signed: Efrain Bermudez MD

## 2025-05-14 NOTE — PLAN OF CARE
Problem: Skin/Tissue Integrity  Goal: Skin integrity remains intact  Outcome: Progressing  Flowsheets (Taken 5/14/2025 0846)  Skin Integrity Remains Intact:   Monitor for areas of redness and/or skin breakdown   Every 4-6 hours minimum:  Change oxygen saturation probe site   Assess vascular access sites hourly     Problem: Respiratory - Adult  Goal: Achieves optimal ventilation and oxygenation  5/14/2025 1818 by Kerline Tse, RN  Outcome: Progressing  Flowsheets (Taken 5/14/2025 0846)  Achieves optimal ventilation and oxygenation:   Assess for changes in respiratory status   Assess for changes in mentation and behavior   Position to facilitate oxygenation and minimize respiratory effort   Oxygen supplementation based on oxygen saturation or arterial blood gases   Initiate smoking cessation protocol as indicated  5/14/2025 0742 by Rafa Cano, RT  Outcome: Progressing  Now on RA     Problem: Cardiovascular - Adult  Goal: Maintains optimal cardiac output and hemodynamic stability  Outcome: Progressing  Flowsheets (Taken 5/14/2025 0846)  Maintains optimal cardiac output and hemodynamic stability:   Monitor blood pressure and heart rate   Monitor urine output and notify Licensed Independent Practitioner for values outside of normal range   Assess for signs of decreased cardiac output  Watch for stable BP    Goal: Absence of cardiac dysrhythmias or at baseline  Outcome: Progressing  Flowsheets (Taken 5/14/2025 0846)  Absence of cardiac dysrhythmias or at baseline:   Monitor cardiac rate and rhythm   Assess for signs of decreased cardiac output     Problem: Gastrointestinal - Adult  Goal: Minimal or absence of nausea and vomiting  Outcome: Progressing  Goal: Maintains or returns to baseline bowel function  Outcome: Progressing  Goal: Maintains adequate nutritional intake  Outcome: Progressing  Possible GI bleed hgb 6.6

## 2025-05-14 NOTE — PROGRESS NOTES
End of Shift Note    Bedside shift change report given to Kerline STOKES (oncoming nurse) by Kanika Kumar RN (offgoing nurse).  Report included the following information SBAR, Intake/Output, MAR, Recent Results, and Cardiac Rhythm NSR    Shift worked:  7p-7a     Shift summary and any significant changes:     Pt wore bipap overnight, no other changes. Still on high flow when not using bipap     Concerns for physician to address:       Zone phone for oncoming shift:          Activity:  Level of Assistance: Standby assist, set-up cues, supervision of patient - no hands on  Number times ambulated in hallways past shift: 0  Number of times OOB to chair past shift: 0    Cardiac:   Cardiac Monitoring: Yes      Cardiac Rhythm: Sinus rhythm    Access:  Current line(s): PIV     Genitourinary:   Urinary Status: Voiding    Respiratory:   O2 Device: Heated high flow cannula  Chronic home O2 use?: NO  Incentive spirometer at bedside: YES    GI:     Current diet:  ADULT DIET; Regular; 3 carb choices (45 gm/meal)  Passing flatus: YES    Pain Management:   Patient states pain is manageable on current regimen: N/A    Skin:  Po Scale Score: 20  Interventions: Wound Offloading (Prevention Methods): Bed, pressure reduction mattress, Repositioning, Pillows    Patient Safety:  Fall Risk: Nursing Judgement-Fall Risk High(Add Comments): Yes  Fall Risk Interventions  Nursing Judgement-Fall Risk High(Add Comments): Yes  Toilet Every 2 Hours-In Advance of Need: Yes  Hourly Visual Checks: In bed, Awake  Fall Visual Posted: Armband, Socks  Room Door Open: Yes  Alarm On: Other (Comment) (refusal)  Patient Moved Closer to Nursing Station: No    Active Consults:   IP CONSULT TO SPIRITUAL SERVICES  IP CONSULT TO CARDIOLOGY  IP CONSULT TO PULMONOLOGY  IP CONSULT TO THORACIC SURGERY    Length of Stay:  Expected LOS: 5  Actual LOS: 3    Kanika Kumar, RN

## 2025-05-15 ENCOUNTER — APPOINTMENT (OUTPATIENT)
Facility: HOSPITAL | Age: 60
DRG: 189 | End: 2025-05-15
Attending: INTERNAL MEDICINE
Payer: COMMERCIAL

## 2025-05-15 LAB
ANION GAP SERPL CALC-SCNC: 5 MMOL/L (ref 2–12)
BUN SERPL-MCNC: 25 MG/DL (ref 6–20)
BUN/CREAT SERPL: 29 (ref 12–20)
CALCIUM SERPL-MCNC: 9.2 MG/DL (ref 8.5–10.1)
CHLORIDE SERPL-SCNC: 101 MMOL/L (ref 97–108)
CO2 SERPL-SCNC: 32 MMOL/L (ref 21–32)
CREAT SERPL-MCNC: 0.87 MG/DL (ref 0.7–1.3)
GLUCOSE BLD STRIP.AUTO-MCNC: 128 MG/DL (ref 65–117)
GLUCOSE BLD STRIP.AUTO-MCNC: 135 MG/DL (ref 65–117)
GLUCOSE BLD STRIP.AUTO-MCNC: 140 MG/DL (ref 65–117)
GLUCOSE BLD STRIP.AUTO-MCNC: 155 MG/DL (ref 65–117)
GLUCOSE SERPL-MCNC: 98 MG/DL (ref 65–100)
POTASSIUM SERPL-SCNC: 3.9 MMOL/L (ref 3.5–5.1)
SERVICE CMNT-IMP: ABNORMAL
SODIUM SERPL-SCNC: 138 MMOL/L (ref 136–145)

## 2025-05-15 PROCEDURE — 94660 CPAP INITIATION&MGMT: CPT

## 2025-05-15 PROCEDURE — 82962 GLUCOSE BLOOD TEST: CPT

## 2025-05-15 PROCEDURE — 36415 COLL VENOUS BLD VENIPUNCTURE: CPT

## 2025-05-15 PROCEDURE — 6370000000 HC RX 637 (ALT 250 FOR IP): Performed by: STUDENT IN AN ORGANIZED HEALTH CARE EDUCATION/TRAINING PROGRAM

## 2025-05-15 PROCEDURE — 6360000004 HC RX CONTRAST MEDICATION: Performed by: THORACIC SURGERY (CARDIOTHORACIC VASCULAR SURGERY)

## 2025-05-15 PROCEDURE — 6370000000 HC RX 637 (ALT 250 FOR IP): Performed by: INTERNAL MEDICINE

## 2025-05-15 PROCEDURE — 6360000002 HC RX W HCPCS: Performed by: STUDENT IN AN ORGANIZED HEALTH CARE EDUCATION/TRAINING PROGRAM

## 2025-05-15 PROCEDURE — 75572 CT HRT W/3D IMAGE: CPT

## 2025-05-15 PROCEDURE — 6360000002 HC RX W HCPCS: Performed by: PHYSICIAN ASSISTANT

## 2025-05-15 PROCEDURE — 2700000000 HC OXYGEN THERAPY PER DAY

## 2025-05-15 PROCEDURE — 80048 BASIC METABOLIC PNL TOTAL CA: CPT

## 2025-05-15 PROCEDURE — 2500000003 HC RX 250 WO HCPCS: Performed by: PHYSICIAN ASSISTANT

## 2025-05-15 PROCEDURE — 2500000003 HC RX 250 WO HCPCS: Performed by: INTERNAL MEDICINE

## 2025-05-15 PROCEDURE — 2060000000 HC ICU INTERMEDIATE R&B

## 2025-05-15 PROCEDURE — 6360000002 HC RX W HCPCS: Performed by: INTERNAL MEDICINE

## 2025-05-15 PROCEDURE — 94640 AIRWAY INHALATION TREATMENT: CPT

## 2025-05-15 RX ADMIN — LEVOFLOXACIN 750 MG: 750 TABLET, FILM COATED ORAL at 14:07

## 2025-05-15 RX ADMIN — FUROSEMIDE 40 MG: 10 INJECTION, SOLUTION INTRAMUSCULAR; INTRAVENOUS at 08:05

## 2025-05-15 RX ADMIN — IPRATROPIUM BROMIDE 0.5 MG: 0.5 SOLUTION RESPIRATORY (INHALATION) at 19:32

## 2025-05-15 RX ADMIN — ENOXAPARIN SODIUM 30 MG: 100 INJECTION SUBCUTANEOUS at 20:45

## 2025-05-15 RX ADMIN — PANTOPRAZOLE SODIUM 40 MG: 40 TABLET, DELAYED RELEASE ORAL at 06:32

## 2025-05-15 RX ADMIN — ARFORMOTEROL TARTRATE: 15 SOLUTION RESPIRATORY (INHALATION) at 19:37

## 2025-05-15 RX ADMIN — IPRATROPIUM BROMIDE 0.5 MG: 0.5 SOLUTION RESPIRATORY (INHALATION) at 13:56

## 2025-05-15 RX ADMIN — PREDNISONE 20 MG: 20 TABLET ORAL at 08:05

## 2025-05-15 RX ADMIN — ENOXAPARIN SODIUM 30 MG: 100 INJECTION SUBCUTANEOUS at 08:05

## 2025-05-15 RX ADMIN — IOPAMIDOL 100 ML: 755 INJECTION, SOLUTION INTRAVENOUS at 18:22

## 2025-05-15 RX ADMIN — FUROSEMIDE 40 MG: 10 INJECTION, SOLUTION INTRAMUSCULAR; INTRAVENOUS at 18:15

## 2025-05-15 RX ADMIN — SODIUM CHLORIDE, PRESERVATIVE FREE 10 ML: 5 INJECTION INTRAVENOUS at 08:07

## 2025-05-15 RX ADMIN — IPRATROPIUM BROMIDE 0.5 MG: 0.5 SOLUTION RESPIRATORY (INHALATION) at 07:41

## 2025-05-15 RX ADMIN — WATER 40 MG: 1 INJECTION INTRAMUSCULAR; INTRAVENOUS; SUBCUTANEOUS at 12:01

## 2025-05-15 RX ADMIN — ARFORMOTEROL TARTRATE: 15 SOLUTION RESPIRATORY (INHALATION) at 07:46

## 2025-05-15 NOTE — PROGRESS NOTES
Houston Valdes presents for follow up evaluation today.  She is accompanied by dad for today's visit.    10/4/23:  Dad states that she is doing well.  She was switched from Flovent to Symbicort with her last visit to pulmonary with Dr. Brand.  She is currently on Symbicort 2 puffs twice daily with spacer.  She has not required use of rescue medication, however they are having difficulty obtaining the Symbicort.  Since last visit she has not had any accidental ingestions of beef or pork.  They are not sure if she is ever had sesame.  Her allergic rhinitis has been well controlled on cetirizine 10 mL once daily.  She has been off Flonase because her symptoms have not been bothersome.  She is currently in the fourth grade, and has been enjoying art class.        Interval history, 4/6/23: Houston presents with her grandmother today for this visit. Her grandmother states that Houston was coughing last week to the point of throwing up. Coughing lasted 2-3 days. She had a fever for 1 night. She contacted pulmonary and was started on prednisolone last week. She took it for 3 days, and symptoms have improved. They used albuterol last week as well. Prior to last week they were using albuterol approximately once weekly. They are no longer using rescue albuterol now that cough has improved. They are using Flovent 1 puff at night. They were not aware that they were supposed to be using Flovent 2 puffs twice daily. They are no longer using montelukast. She started cetirizine 10 mL a day 3 days ago. They have not restarted Flonase.    Initial history, 10/24/22: HOUSTON VALDES is an 9 year old girl who was seen at the request of Dr. Alessandra Casas for a chief complaint of food allergy; a report with my findings is being sent via written or electronic means to Dr. Casas with my recommendations for treatment    Mom provides the following history:    Since around the age of 3, Houston started getting nausea and vomiting several  hours after eating dairy. She notes that Sabine would also get vomiting after eating whole egg, but not baked egg. She notes that she had a more significant reaction to beef and pork. She last tried beef and pork around the age of 4. She notes that they were eating a pot roast which caused vomiting within an hour. She tolerates Jell-O. She has had a food allergy panel which had several low positive results including soy, corn, peanut, wheat. She has eaten these foods with no problem. She has been avoiding peanut butter since her last positive test though she had no reaction to it in the past and she likes peanut butter. She continues to eat corn, wheat, soy. She eats tree nuts. She has not had seafood, because they do not eat at home. She has never had sesame.    Regarding environmental allergies, she gets itchy eyes, runny nose, sneezing, nasal congestion which started as a toddler. It occurs mostly in the spring and fall. She takes Zyrtec 5ml and benadryl as needed. Has used Flonase in past.     Asthma: Recently diagnosed with asthma after being hospitalized from bacterial pneumonia in September 2022. She follows with pediatric pulmonary and was recently started on Flovent and montelukast. Mom states that since she started Flovent, she has not had any problems breathing. Mom states in retrospect, she had shortness of breath which was likely due to asthma, leading up to the pneumonia diagnosis.     Eczema: Denies     Venom allergy: Denies     Drug allergy: Received Ceftriaxone in hospital in 9/2022 and had hives within 5 minutes. Has never had cephalosporin in the past. She tolerates amoxicillin with no problem. She also tolerates azithromycin.    Lastly, she follows with endocrinology for hormone imbalances causing weight gain.    Environmental History  Type of home: House   Pets in the house: 2 dogs   Basement in the home: Yes  Mold or moisture in the home: No   Bedroom pedrito: Hardwood  Dust mite covers on  "bed: None  HVAC: Central air  Cigarette exposure in the home: Denies  Occupation/School: 3rd grade, [employer]    Pertinent Allergy/Immunology family history:  Mom with allergic rhinitis and exercise-induced asthma, SLE   Maternal uncle with severe eczema  Paternal GM with asthma  Sister, age 6 with seasonal allergies and eczema     Review of Systems   Constitutional: Negative.    HENT: Negative.     Eyes: Negative.    Respiratory: Negative.     Cardiovascular: Negative.    Gastrointestinal: Negative.    Endocrine: Negative.    Musculoskeletal: Negative.    Skin: Negative.    Neurological: Negative.      Vitals:  Pulse 98   Resp 22   Ht 1.335 m (4' 4.56\")   Wt (!) 66.2 kg   BMI 37.14 kg/m²     Physical exam:  GENERAL: Alert, oriented and in no acute distress.     HEENT: EYES: No conjunctival injection or cobblestoning. Nose: nasal turbinates mildly edematous and are not boggy.  There is no mucous stranding, polyps, or blood    noted. EARS: Tympanic membranes are clear. MOUTH: moist and pink with no exudates, ulcers, or thrush. NECK: is supple, without adenopathy.  No upper airway stridor noted.       HEART: regular rate and rhythm.       LUNGS: Clear to auscultation bilaterally. No wheezing, rhonchi or rales.        ABDOMEN: Positive bowel sounds, soft, nontender, nondistended.       EXTREMITIES: No clubbing or edema.        NEURO:  Normal affect.  Gait normal.      SKIN: No rash, hives, or angioedema noted      Impression:   1. Allergy to beef    2. Allergy to other foods    3. Seasonal allergic rhinitis due to pollen    4. Allergic rhinitis due to animal hair and dander    5. Moderate persistent asthma without complication         Plan   Allergic rhinitis, seasonal and perennial: Environmental allergen specific IgE panel in November 2022 predominantly positive for cat and dog, with mild sensitivity to tree, grass, weed.  Continue cetirizine 10 mL once daily.  If nasal symptoms are bothersome, restart " Flonase 1 spray each nostril once daily.     History of allergy to beef and pork; sensitization to sesame without history of clinical ingestion: She has had immediate vomiting after the ingestion of beef and pork. Skin prick testing 3/2023 negative to beef. Specific IgE to beef 0.80 inn 10/22 (down from 1.47, 10/2020). Continue to avoid pork (sIgE 12.2, 10/22; sIgE 32.3 10/2020).  Beef challenge offered last visit, however they declined.  They are not sure if she has ever had sesame.  We will continue to monitor and have ordered specific IgE to beef, pork, sesame.  She has needle phobia, have offered referral to Dr. Mayda Olivares. Epi Pen and food allergy action plan up to date.     Asthma, moderate persistent: Followed by Pediatric Pulmonary, Dr. Brand. Last PFTs 12/21/2022 showed FEV1 of 100% predicted, FVC 91% predicted, FEF 25/75 101% predicted.  Currently on Symbicort 2 puffs twice daily, however has not been able to refill medication due to cost.  They will touch base with pulmonary.      Plan for follow-up in 6 months or sooner if needed    the right internal carotid artery.    Mild (<50%) stenosis in the left internal carotid artery.  Mild and   heterogeneous plaque in the left internal carotid artery.    Normal antegrade flow involving the right vertebral artery.    Normal antegrade flow involving the left vertebral artery.    Vascular duplex carotid bilateral   Final Result      Vascular ankle brachial index (PATRICIO)         CTA CHEST W WO CONTRAST   Final Result   No evidence of pulmonary embolism. Bilateral lower lobe mucous plugging,   parabronchial soft tissue density, bilateral lower lobe consolidation.   Dilatation of the ascending thoracic aorta, measuring 4.7 cm.         Electronically signed by LAZARO MC      Vascular duplex lower extremity venous bilateral   Final Result    No evidence of bilateral lower extremity deep venous    thrombosis.         XR CHEST PORTABLE   Final Result   No acute cardiopulmonary findings.            Electronically signed by Benjy Shipley      CTA TRANSCATHETER AORTIC VALVE REPLACEMENT    (Results Pending)         I personally reviewed laboratory testing, pulmonary imaging, radiology reports, and pulse oximetry data.    Please note that this dictation was completed with [employer], the computer voice recognition software.  Quite often unanticipated grammatical, syntax, homophones, and other interpretive errors are inadvertently transcribed by the computer software.  Please disregard these errors.  Please excuse any errors that have escaped final proofreading  ______________________________________________________________________      Thank you for allowing us to participate in the care of this patient.      This care involved high complexity medical decision making: I personally:  Reviewed the flowsheet and previous days notes  Reviewed and summarized records or history from previous days note or discussions with staff, family  High Risk Drug therapy requiring intensive monitoring for toxicity: eg steroids,

## 2025-05-15 NOTE — PROGRESS NOTES
Bedside shift change report given to KIKE Kelly and KIKE Barksdale (oncoming nurse) by KIKE Boudreaux (offgoing nurse). Report included the following information Nurse Handoff Report and Cardiac Rhythm NSR .      0724--KIKE Barksdale will be charting on this patient.     1815--Patient transported to CT scan on non re-breather after practicing laying flat with non re-breather for 15 minutes and oxygen saturation being mid to high 90's.     1830--Patient back in room from CT scan.     1944--This RN agrees with KIKE Barksdale's charting.       End of Shift Note    Bedside shift change report given to KIKE Hernadez (oncoming nurse) by Leticia García RN and KIKE Barksdale (offgoing nurse).  Report included the following information SBAR    Shift worked:  Day shift     Shift summary and any significant changes:     Patient had cardiac CT done today. Oxygen weaned to 30 L at 45% on heated high flow and patient tolerating.   Pulmonology changed steroids to IV.    Concerns for physician to address:  --CT results pending     Zone phone for oncoming shift:   xx       Activity:  Level of Assistance: Standby assist, set-up cues, supervision of patient - no hands on  Number times ambulated in hallways past shift: 0  Number of times OOB to chair past shift: 0    Cardiac:   Cardiac Monitoring: Yes      Cardiac Rhythm: Sinus rhythm    Access:  Current line(s): PIV     Genitourinary:   Urinary Status: Voiding, Bathroom privileges    Respiratory:   O2 Device: Heated high flow cannula  Chronic home O2 use?: NO  Incentive spirometer at bedside:     GI:  Last BM (including prior to admit): 05/14/25  Current diet:  ADULT DIET; Regular; 3 carb choices (45 gm/meal)  Passing flatus: YES    Pain Management:   Patient states pain is manageable on current regimen: YES    Skin:  Po Scale Score: 21  Interventions: Wound Offloading (Prevention Methods): Bed, pressure reduction mattress, Pillows, Repositioning    Patient Safety:  Fall Risk: Nursing Judgement-Fall Risk

## 2025-05-15 NOTE — PROGRESS NOTES
Hospitalist Progress Note    NAME:   Jose Díaz III   : 1965   MRN: [id number]     Date/Time: 5/15/2025 11:46 AM  Patient PCP: No primary care provider on file.    Estimated discharge date:   Barriers: needs stable respiration , cardio , pulm ,    ICU COURSE :   59 M smoker (1 PPD x approx 40 yrs) with history of COPD on Trelegy inhaler who at baseline is highly functional, employed as an . He admits to approx 2 beers per day and occasional recreational substance use. Admitted in transfer from [facility] with acute hypoxemic respiratory failure. Symptoms began approx 2 weeks ago with exertional dyspnea, cough, yellow mucus, scant hemoptysis. He was treated by his primary care provider as COPD exacerbation with prednisone and antibiotics and noted some improvement but not resolution. In the past 2-3 days he has had increasing dyspnea which progressed to rest dyspnea with orthopnea noted on the night prior to admission. In the [facility] ED, he was hypoxic and tachypneic. His CXR revealed CM with mild interstitial prominence. His EKG revealed changes consistent with LVH and T wave inversions in V5, V6. Troponin was modestly elevated in mid 200s, BNP > 10,000. He received furosemide and bronchodilators with transient improvement, then deterioration requiring NIPPV. Therefore, [facility] CCM was consulted and accepted the patient in transfer. Upon arrival to the ICU, he was placed on NC O2. Although tachypneic (RR aprox 26/min) he was able to speak in full sentences with SpO2 94% 0n 6 LPM NC. However, he desaturated significantly when sitting to side of the bed to urinate. Therefore, NIPPV was resumed. His initial exam revealed distant breath sounds with few scattered wheezes and asymmetric R>L ankle edema. Admitting diagnosis was acute hypoxic resp failure of unclear etiology and likely multifactorial - CHF, COPD exac and rule out VTE       Assessment / Plan:  Acute hypoxic respiratory failure- needing bipap

## 2025-05-15 NOTE — PLAN OF CARE
Predictive Model Details          30  Factor Value    Calculated 5/15/2025 10:50 36% Supplemental oxygen Heated high flow cannula    Deterioration Index Model 32% Age 59 years old     13% Pulse oximetry 89 %     8% Respiratory rate 18     4% Systolic 105     3% BUN abnormal (25 MG/DL)     2% Sodium 138 mmol/L     1% Blood pH 7.41       1% Hematocrit 49.0 %     0% Pulse 80     0% WBC count 7.4 K/uL     0% Potassium 3.9 mmol/L     0% Temperature 98.2 °F (36.8 °C)        Problem: Discharge Planning  Goal: Discharge to home or other facility with appropriate resources  5/15/2025 1050 by Shamir Hester RN  Outcome: Progressing  Flowsheets (Taken 5/15/2025 0805)  Discharge to home or other facility with appropriate resources:   Identify barriers to discharge with patient and caregiver   Arrange for needed discharge resources and transportation as appropriate  5/15/2025 0012 by Kanika Kumar RN  Outcome: Progressing  5/15/2025 0012 by Kanika Kumar RN  Outcome: Progressing     Problem: Skin/Tissue Integrity  Goal: Skin integrity remains intact  Description: 1.  Monitor for areas of redness and/or skin breakdown2.  Assess vascular access sites hourly3.  Every 4-6 hours minimum:  Change oxygen saturation probe site4.  Every 4-6 hours:  If on nasal continuous positive airway pressure, respiratory therapy assess nares and determine need for appliance change or resting period  Outcome: Progressing  Flowsheets (Taken 5/15/2025 0805)  Skin Integrity Remains Intact: Monitor for areas of redness and/or skin breakdown     Problem: Safety - Adult  Goal: Free from fall injury  5/15/2025 1050 by Shamir Hester RN  Outcome: Progressing  5/15/2025 0012 by Kanika Kumar RN  Outcome: Progressing  5/15/2025 0012 by Kanika Kumar RN  Outcome: Progressing     Problem: Respiratory - Adult  Goal: Achieves optimal ventilation and oxygenation  5/15/2025 1050 by Shamir Hester RN  Outcome: Progressing  Flowsheets (Taken 5/15/2025

## 2025-05-15 NOTE — PROGRESS NOTES
End of Shift Note    Bedside shift change report given to Maricarmen STOKES (oncoming nurse) by Shamir Hester, RN (offgoing nurse).  Report included the following information SBAR    Shift worked:  Day shift     Shift summary and any significant changes:    Uneventful shift. Pt got CT of heart. Waiting on results. Bed refusal signed.      Concerns for physician to address:    Zone phone for oncoming shift:       Activity:  Level of Assistance: Standby assist, set-up cues, supervision of patient - no hands on  Number times ambulated in hallways past shift: 0  Number of times OOB to chair past shift: 0    Cardiac:   Cardiac Monitoring: Yes      Cardiac Rhythm: Sinus rhythm    Access:  Current line(s): PIV    Genitourinary:   Urinary Status: Voiding, Bathroom privileges    Respiratory:   O2 Device: Heated high flow cannula  Chronic home O2 use?: NO  Incentive spirometer at bedside: YES    GI:  Last BM (including prior to admit): 05/14/25  Current diet:  ADULT DIET; Regular; 3 carb choices (45 gm/meal)  Passing flatus: YES    Pain Management:   Patient states pain is manageable on current regimen: YES    Skin:  Po Scale Score: 21  Interventions: Wound Offloading (Prevention Methods): Bed, pressure reduction mattress, Pillows, Repositioning    Patient Safety:  Fall Risk: Nursing Judgement-Fall Risk High(Add Comments): Yes  Fall Risk Interventions  Nursing Judgement-Fall Risk High(Add Comments): Yes  Toilet Every 2 Hours-In Advance of Need: Yes  Hourly Visual Checks: In bed, Awake  Fall Visual Posted: Armband, Socks  Room Door Open: Yes  Alarm On: Other (Comment) (refusal)  Patient Moved Closer to Nursing Station: No    Active Consults:   IP CONSULT TO SPIRITUAL SERVICES  IP CONSULT TO CARDIOLOGY  IP CONSULT TO PULMONOLOGY  IP CONSULT TO THORACIC SURGERY    Length of Stay:  Expected LOS: 8  Actual LOS: 4    Shamir Hester, RN

## 2025-05-15 NOTE — PROGRESS NOTES
[facility] FLOOR (Pre-op) Progress Note    Admit Date: 2025  POD: * No surgery found *      Procedure:        Subjective/overnight events:   Pt discussed with Dr. Soto, seen in bed. In NSR, on 35 liters high flow, 60% FiO2, afebrile. Vital signs stable. No events overnight. SOB about the same.    On the following infusions: none.  Objective:     /74   Pulse 80   Temp 98.2 °F (36.8 °C) (Oral)   Resp 18   Ht 1.829 m (6')   Wt 98.7 kg (217 lb 8 oz)   SpO2 (!) 89%   BMI 29.50 kg/m²   Temp (24hrs), Av.2 °F (36.8 °C), Min:98 °F (36.7 °C), Max:98.5 °F (36.9 °C)      Last 24hr Input/Output:    Intake/Output Summary (Last 24 hours) at 5/15/2025 0927  Last data filed at 2025 1832  Gross per 24 hour   Intake 240 ml   Output 2150 ml   Net -1910 ml        Pre-op Workup    Carotid duplex,25 :     Mild (<50%) stenosis in the right internal carotid artery.  Mild and heterogeneous plaque in the right internal carotid artery.    Mild (<50%) stenosis in the left internal carotid artery.  Mild and heterogeneous plaque in the left internal carotid artery.    Normal antegrade flow involving the right vertebral artery.    Normal antegrade flow involving the left vertebral artery.    Cardiac cath: Pending.    Echocardiogram: 25    ECHO (TTE) LIMITED (PRN CONTRAST/BUBBLE/STRAIN/3D) 2025  2:42 PM (Final)    Interpretation Summary    Left Ventricle: Reduced left ventricular systolic function with a visually estimated EF of 35 - 40%. Left ventricle size is normal. Moderately increased wall thickness. Global hypokinesis present.    Right Ventricle: Right ventricle size is normal. Normal systolic function.    Aortic Valve: Not well visualized. Calcified cusps. Mild regurgitation. Severe stenosis of the aortic valve by provided gradient. AV mean gradient is 68 mmHg. AV area by continuity VTI is 0.7 cm2.    Left Atrium: Left atrium is dilated.    Right Atrium: Right atrium is dilated.    Signed by: Car GARCIA  None

## 2025-05-15 NOTE — PROGRESS NOTES
End of Shift Note    Bedside shift change report given to Marc STOKES (oncoming nurse) by Kanika Kumar, RN (offgoing nurse).  Report included the following information SBAR, Intake/Output, MAR, Recent Results, and Cardiac Rhythm NSR    Shift worked:  7p-7a     Shift summary and any significant changes:     No events overnight, pt still on high flow 35L.     Concerns for physician to address:       Zone phone for oncoming shift:          Activity:  Level of Assistance: Standby assist, set-up cues, supervision of patient - no hands on  Number times ambulated in hallways past shift: 0  Number of times OOB to chair past shift: 0    Cardiac:   Cardiac Monitoring: Yes      Cardiac Rhythm: Sinus rhythm    Access:  Current line(s): PIV     Genitourinary:   Urinary Status: Voiding    Respiratory:   O2 Device: Heated high flow cannula  Chronic home O2 use?: NO  Incentive spirometer at bedside: YES    GI:  Last BM (including prior to admit): 05/14/25  Current diet:  ADULT DIET; Regular; 3 carb choices (45 gm/meal)  Passing flatus: YES    Pain Management:   Patient states pain is manageable on current regimen: N/A    Skin:  Po Scale Score: 21  Interventions: Wound Offloading (Prevention Methods): Pillows, Repositioning, Turning    Patient Safety:  Fall Risk: Nursing Judgement-Fall Risk High(Add Comments): Yes  Fall Risk Interventions  Nursing Judgement-Fall Risk High(Add Comments): Yes  Toilet Every 2 Hours-In Advance of Need: Yes  Hourly Visual Checks: In bed, Awake  Fall Visual Posted: Armband, Socks  Room Door Open: Yes  Alarm On: Other (Comment) (refusal)  Patient Moved Closer to Nursing Station: No    Active Consults:   IP CONSULT TO SPIRITUAL SERVICES  IP CONSULT TO CARDIOLOGY  IP CONSULT TO PULMONOLOGY  IP CONSULT TO THORACIC SURGERY    Length of Stay:  Expected LOS: 8  Actual LOS: 4    Kanika Kumar, RN

## 2025-05-15 NOTE — PROGRESS NOTES
Cardiology Progress Note  5/15/2025     Admit Date: 5/11/2025  Admit Diagnosis: Acute respiratory failure with hypoxemia (HCC) [J96.01]  Acute hypoxemic respiratory failure (HCC) [J96.01]  CC: none currently   Cardiac Assessment/Plan:    1) AS: severe by provided doppler on echo, but difficult to see AoV  2) Elevated trop (200s): LVH w/ repol changes on ecg; atypical CP/SHAY.  3) CM: EF 35-40% on echo; Nl TSH.  4) TAA: ascending Ao 5.1 cm 2/2024. 4.7 5/2025     5) COPD, Severe per Dr De La Cruz; ongoing tobacco use  6) EtOH excess; cocaine use per report     Admitted w/ resp failure as noted below; improved w/ diuretics/steroids.    Rec 5/12: Needs CTA of Ao; Will need cath to eval cors after resp status improves.         Echo 5/12/25:    Left Ventricle: Reduced left ventricular systolic function with a visually estimated EF of 35 - 40%. Left ventricle size is normal. Moderately increased wall thickness. Global hypokinesis present.    Right Ventricle: Right ventricle size is normal. Normal systolic function.    Aortic Valve: Not well visualized. Calcified cusps. Mild regurgitation. Severe stenosis of the aortic valve by provided gradient. AV mean gradient is 68 mmHg. AV area by continuity VTI is 0.7 cm2.    Left Atrium: Left atrium is dilated.    Right Atrium: Right atrium is dilated.    CTA 5/12/25:  No evidence of pulmonary embolism. Bilateral lower lobe mucous plugging, parabronchial soft tissue density, bilateral lower lobe consolidation. Dilatation of the ascending thoracic aorta, measuring 4.7 cm.    5/13: no CP; less dyspnea; Eating a fast-food sausage biscuit     BP 91-110s; HR 80-90s; Sinus; 91% 5L; excellent UOP  K 4.1; Cr 0.8; Alb 2.8; WBC 13; Hg 14.3.    5/14:  No CP; no change in dyspnea despite marked increase O2 requirement  BP ; HR; 70-90s; sinus; 91% 40L; excellent UOP  Hg 15.3; Chem pending    5/15: No CP; less dyspnea overall but still on high dose O2.  -110s; HR ;

## 2025-05-16 LAB
ANION GAP SERPL CALC-SCNC: 4 MMOL/L (ref 2–12)
BACTERIA SPEC CULT: NORMAL
BACTERIA SPEC CULT: NORMAL
BUN SERPL-MCNC: 29 MG/DL (ref 6–20)
BUN/CREAT SERPL: 38 (ref 12–20)
CALCIUM SERPL-MCNC: 9 MG/DL (ref 8.5–10.1)
CHLORIDE SERPL-SCNC: 102 MMOL/L (ref 97–108)
CO2 SERPL-SCNC: 30 MMOL/L (ref 21–32)
CREAT SERPL-MCNC: 0.77 MG/DL (ref 0.7–1.3)
GLUCOSE BLD STRIP.AUTO-MCNC: 117 MG/DL (ref 65–117)
GLUCOSE BLD STRIP.AUTO-MCNC: 138 MG/DL (ref 65–117)
GLUCOSE BLD STRIP.AUTO-MCNC: 149 MG/DL (ref 65–117)
GLUCOSE BLD STRIP.AUTO-MCNC: 98 MG/DL (ref 65–117)
GLUCOSE SERPL-MCNC: 154 MG/DL (ref 65–100)
POTASSIUM SERPL-SCNC: 4.3 MMOL/L (ref 3.5–5.1)
SERVICE CMNT-IMP: ABNORMAL
SERVICE CMNT-IMP: ABNORMAL
SERVICE CMNT-IMP: NORMAL
SODIUM SERPL-SCNC: 136 MMOL/L (ref 136–145)

## 2025-05-16 PROCEDURE — 82962 GLUCOSE BLOOD TEST: CPT

## 2025-05-16 PROCEDURE — 6360000002 HC RX W HCPCS: Performed by: STUDENT IN AN ORGANIZED HEALTH CARE EDUCATION/TRAINING PROGRAM

## 2025-05-16 PROCEDURE — 6360000002 HC RX W HCPCS: Performed by: INTERNAL MEDICINE

## 2025-05-16 PROCEDURE — 2500000003 HC RX 250 WO HCPCS: Performed by: INTERNAL MEDICINE

## 2025-05-16 PROCEDURE — 2500000003 HC RX 250 WO HCPCS: Performed by: PHYSICIAN ASSISTANT

## 2025-05-16 PROCEDURE — 94640 AIRWAY INHALATION TREATMENT: CPT

## 2025-05-16 PROCEDURE — 6360000002 HC RX W HCPCS: Performed by: PHYSICIAN ASSISTANT

## 2025-05-16 PROCEDURE — 6370000000 HC RX 637 (ALT 250 FOR IP): Performed by: STUDENT IN AN ORGANIZED HEALTH CARE EDUCATION/TRAINING PROGRAM

## 2025-05-16 PROCEDURE — 94660 CPAP INITIATION&MGMT: CPT

## 2025-05-16 PROCEDURE — 2060000000 HC ICU INTERMEDIATE R&B

## 2025-05-16 PROCEDURE — 2700000000 HC OXYGEN THERAPY PER DAY

## 2025-05-16 PROCEDURE — 36415 COLL VENOUS BLD VENIPUNCTURE: CPT

## 2025-05-16 PROCEDURE — 80048 BASIC METABOLIC PNL TOTAL CA: CPT

## 2025-05-16 PROCEDURE — 6370000000 HC RX 637 (ALT 250 FOR IP): Performed by: INTERNAL MEDICINE

## 2025-05-16 RX ORDER — FUROSEMIDE 10 MG/ML
60 INJECTION INTRAMUSCULAR; INTRAVENOUS 2 TIMES DAILY
Status: DISCONTINUED | OUTPATIENT
Start: 2025-05-16 | End: 2025-05-18

## 2025-05-16 RX ADMIN — WATER 40 MG: 1 INJECTION INTRAMUSCULAR; INTRAVENOUS; SUBCUTANEOUS at 00:11

## 2025-05-16 RX ADMIN — WATER 40 MG: 1 INJECTION INTRAMUSCULAR; INTRAVENOUS; SUBCUTANEOUS at 11:51

## 2025-05-16 RX ADMIN — PANTOPRAZOLE SODIUM 40 MG: 40 TABLET, DELAYED RELEASE ORAL at 05:13

## 2025-05-16 RX ADMIN — FUROSEMIDE 60 MG: 10 INJECTION, SOLUTION INTRAMUSCULAR; INTRAVENOUS at 17:48

## 2025-05-16 RX ADMIN — ARFORMOTEROL TARTRATE: 15 SOLUTION RESPIRATORY (INHALATION) at 19:48

## 2025-05-16 RX ADMIN — IPRATROPIUM BROMIDE 0.5 MG: 0.5 SOLUTION RESPIRATORY (INHALATION) at 14:22

## 2025-05-16 RX ADMIN — ENOXAPARIN SODIUM 30 MG: 100 INJECTION SUBCUTANEOUS at 09:24

## 2025-05-16 RX ADMIN — Medication 1 AMPULE: at 20:50

## 2025-05-16 RX ADMIN — SODIUM CHLORIDE, PRESERVATIVE FREE 10 ML: 5 INJECTION INTRAVENOUS at 23:14

## 2025-05-16 RX ADMIN — SODIUM CHLORIDE, PRESERVATIVE FREE 10 ML: 5 INJECTION INTRAVENOUS at 09:26

## 2025-05-16 RX ADMIN — FUROSEMIDE 60 MG: 10 INJECTION, SOLUTION INTRAMUSCULAR; INTRAVENOUS at 09:27

## 2025-05-16 RX ADMIN — ENOXAPARIN SODIUM 30 MG: 100 INJECTION SUBCUTANEOUS at 20:51

## 2025-05-16 RX ADMIN — Medication 1 AMPULE: at 09:24

## 2025-05-16 RX ADMIN — SODIUM CHLORIDE, PRESERVATIVE FREE 10 ML: 5 INJECTION INTRAVENOUS at 00:11

## 2025-05-16 RX ADMIN — WATER 40 MG: 1 INJECTION INTRAMUSCULAR; INTRAVENOUS; SUBCUTANEOUS at 23:13

## 2025-05-16 RX ADMIN — IPRATROPIUM BROMIDE 0.5 MG: 0.5 SOLUTION RESPIRATORY (INHALATION) at 19:43

## 2025-05-16 RX ADMIN — IPRATROPIUM BROMIDE 0.5 MG: 0.5 SOLUTION RESPIRATORY (INHALATION) at 07:35

## 2025-05-16 RX ADMIN — ARFORMOTEROL TARTRATE: 15 SOLUTION RESPIRATORY (INHALATION) at 07:35

## 2025-05-16 RX ADMIN — Medication 1 AMPULE: at 00:11

## 2025-05-16 ASSESSMENT — PAIN SCALES - GENERAL: PAINLEVEL_OUTOF10: 0

## 2025-05-16 NOTE — CARE COORDINATION
Transition of Care Plan:    RUR: 8% Low RUR  Prior Level of Functioning: Independent in ADLs/IADLs  Disposition: Home with follow up  CHESTRE: 5/19  If SNF or IPR: Date FOC offered: na  Date FOC received:   Accepting facility:   Date authorization started with reference number:   Date authorization received and expires:   Follow up appointments: pcp/specialist   DME needed: oxygen?  Transportation at discharge: The patient's family will provide a ride.   IM/IMM Medicare/ letter given: na  Is patient a [place] and connected with [facility]? na   If yes, was [place] transfer form completed and [facility] notified? na  Caregiver Contact: (Jacqueline Díaz (Spouse)  [phone])  Discharge Caregiver contacted prior to discharge? Contacted by the patient  Care Conference needed? NO  Barriers to discharge: Barriers: needs stable respiration , cardio , pulm ,    CM contacted CMA requesting her assistance to arrange a pcp for the patient after receiving he approval.     The patient is alert, able to communicate  needs effectively,  independent in ADLS/IADLS, and able to drive. The patient lives with Jacqueline Arjun,(Spouse) [phone] and does not use any DMEs. The patient's family will provide a ride. The patient does not have oxygen at home and must be either weaned off and arranged.     Usha Au RN  Case Management  [phone]

## 2025-05-16 NOTE — PROGRESS NOTES
End of Shift Note    Bedside shift change report given to Maricarmen STOKES (oncoming nurse) by Shamir Hester RN (offgoing nurse).  Report included the following information SBAR    Shift worked:  Day shift     Shift summary and any significant changes:    Uneventful shift. Pt weaned to 5L NC.      Concerns for physician to address:    Zone phone for oncoming shift:       Activity:  Level of Assistance: Minimal assist, patient does 75% or more  Number times ambulated in hallways past shift: 0  Number of times OOB to chair past shift: 0    Cardiac:   Cardiac Monitoring: Yes      Cardiac Rhythm: Sinus rhythm    Access:  Current line(s): PIV    Genitourinary:   Urinary Status: Voiding    Respiratory:   O2 Device: High flow nasal cannula  Chronic home O2 use?: NO  Incentive spirometer at bedside: YES    GI:  Last BM (including prior to admit): 05/15/25  Current diet:  ADULT DIET; Regular; 3 carb choices (45 gm/meal)  Passing flatus: YES    Pain Management:   Patient states pain is manageable on current regimen: YES    Skin:  Po Scale Score: 21  Interventions: Wound Offloading (Prevention Methods): Pillows, Repositioning    Patient Safety:  Fall Risk: Nursing Judgement-Fall Risk High(Add Comments): Yes  Fall Risk Interventions  Nursing Judgement-Fall Risk High(Add Comments): Yes  Toilet Every 2 Hours-In Advance of Need: Yes  Hourly Visual Checks: Awake, In bed  Fall Visual Posted: Socks  Room Door Open: Yes  Alarm On: Other (Comment) (refusal)  Patient Moved Closer to Nursing Station: No    Active Consults:   IP CONSULT TO SPIRITUAL SERVICES  IP CONSULT TO CARDIOLOGY  IP CONSULT TO PULMONOLOGY  IP CONSULT TO THORACIC SURGERY    Length of Stay:  Expected LOS: 8  Actual LOS: 5    Shamir Hester RN

## 2025-05-16 NOTE — PLAN OF CARE
Predictive Model Details          34 (Caution)  Factor Value    Calculated 5/16/2025 12:07 32% Supplemental oxygen High flow nasal cannula    Deterioration Index Model 28% Age 59 years old     16% Respiratory rate 20     9% Systolic 100     4% Potassium 4.3 mmol/L     3% Pulse 95     3% Sodium 136 mmol/L     3% BUN abnormal (29 MG/DL)     1% Pulse oximetry 94 %     1% Blood pH 7.41       1% Hematocrit 49.0 %     0% WBC count 7.4 K/uL     0% Temperature 98.3 °F (36.8 °C)        Problem: Discharge Planning  Goal: Discharge to home or other facility with appropriate resources  5/16/2025 1206 by Shamir Hester RN  Outcome: Progressing  Flowsheets (Taken 5/16/2025 1157)  Discharge to home or other facility with appropriate resources:   Identify barriers to discharge with patient and caregiver   Arrange for needed discharge resources and transportation as appropriate  5/16/2025 1057 by Leticia García RN  Outcome: Progressing     Problem: Skin/Tissue Integrity  Goal: Skin integrity remains intact  Description: 1.  Monitor for areas of redness and/or skin breakdown2.  Assess vascular access sites hourly3.  Every 4-6 hours minimum:  Change oxygen saturation probe site4.  Every 4-6 hours:  If on nasal continuous positive airway pressure, respiratory therapy assess nares and determine need for appliance change or resting period  5/16/2025 1206 by Shamir Hester RN  Outcome: Progressing  Flowsheets (Taken 5/16/2025 1157)  Skin Integrity Remains Intact:   Monitor for areas of redness and/or skin breakdown   Every 4-6 hours minimum:  Change oxygen saturation probe site   Assess vascular access sites hourly  5/16/2025 1057 by Leticia García RN  Outcome: Progressing  Flowsheets (Taken 5/16/2025 0730)  Skin Integrity Remains Intact: Monitor for areas of redness and/or skin breakdown     Problem: Safety - Adult  Goal: Free from fall injury  5/16/2025 1206 by Shamir Hester RN  Outcome: Progressing  5/16/2025 1057 by Raquel  Maintains or returns to baseline bowel function  5/16/2025 1206 by Shamir Hester RN  Outcome: Progressing  Flowsheets (Taken 5/16/2025 1157)  Maintains or returns to baseline bowel function:   Assess bowel function   Encourage oral fluids to ensure adequate hydration  5/16/2025 1057 by Leticia García RN  Outcome: Progressing  Goal: Maintains adequate nutritional intake  5/16/2025 1206 by Shamir Hester RN  Outcome: Progressing  Flowsheets (Taken 5/16/2025 1157)  Maintains adequate nutritional intake:   Monitor percentage of each meal consumed   Identify factors contributing to decreased intake, treat as appropriate  5/16/2025 1057 by Leticia García RN  Outcome: Progressing

## 2025-05-16 NOTE — PROGRESS NOTES
Pt placed on [facility] BiPAP at 2253 on settings noted below.      05/15/25 2253   NIV Type   Ventilator ID [id number]   NIV Started/Stopped On   Equipment Type Trilogy   Mode Bilevel   Mask Type Full face mask   Mask Size Large   Assessment   Pulse 94   Heart Rate Source Monitor   Respirations 28   SpO2 96 %   Level of Consciousness 0   Comfort Level Good   Using Accessory Muscles No   Mask Compliance Good   Skin Assessment Clean, dry, & intact   Breath Sounds   Respiratory Pattern Regular   Settings/Measurements   PIP Observed 14.6 cm H20   IPAP 14 cmH20   CPAP/EPAP 8 cmH2O   Vt (Measured) 369 mL   Rate Ordered 12   Insp Rise Time (%) 2 %   FiO2  60 %   I Time/ I Time % 1 s   Minute Volume (L/min) 10.3 Liters   Mask Leak (lpm) 37.3 lpm   Patient's Home Machine No   Alarm Settings   Alarms On Y   Apnea (secs) 20 secs   RR Low (bpm) 8   RR High (bpm) 45 br/min

## 2025-05-16 NOTE — PLAN OF CARE
Problem: Discharge Planning  Goal: Discharge to home or other facility with appropriate resources  Outcome: Progressing     Problem: Skin/Tissue Integrity  Goal: Skin integrity remains intact  Description: 1.  Monitor for areas of redness and/or skin breakdown2.  Assess vascular access sites hourly3.  Every 4-6 hours minimum:  Change oxygen saturation probe site4.  Every 4-6 hours:  If on nasal continuous positive airway pressure, respiratory therapy assess nares and determine need for appliance change or resting period  Outcome: Progressing  Flowsheets (Taken 5/16/2025 0730)  Skin Integrity Remains Intact: Monitor for areas of redness and/or skin breakdown     Problem: Safety - Adult  Goal: Free from fall injury  Outcome: Progressing     Problem: Respiratory - Adult  Goal: Achieves optimal ventilation and oxygenation  5/16/2025 1057 by Leticia García RN  Outcome: Progressing  5/16/2025 0739 by [place], RT  Outcome: Progressing     Problem: Cardiovascular - Adult  Goal: Maintains optimal cardiac output and hemodynamic stability  Outcome: Progressing  Goal: Absence of cardiac dysrhythmias or at baseline  Outcome: Progressing     Problem: Gastrointestinal - Adult  Goal: Minimal or absence of nausea and vomiting  Outcome: Progressing  Goal: Maintains or returns to baseline bowel function  Outcome: Progressing  Goal: Maintains adequate nutritional intake  Outcome: Progressing

## 2025-05-16 NOTE — PROGRESS NOTES
[facility] FLOOR (Pre-op) Progress Note    Admit Date: 2025  POD: * No surgery found *      Procedure:        Subjective/overnight events:   Pt discussed with Dr. Soto, seen sitting on the side of the bed. In  SR-ST,  on 8 liters high flow, 45% FiO2, afebrile. Vital signs stable. No events overnight. Is worried about dying in the hospital.     On the following infusions: none.  Objective:     /86   Pulse 87   Temp 98 °F (36.7 °C) (Oral)   Resp 20   Ht 1.829 m (6' 0.01\")   Wt 98.2 kg (216 lb 7.9 oz)   SpO2 97%   BMI 29.36 kg/m²   Temp (24hrs), Av.1 °F (36.7 °C), Min:98 °F (36.7 °C), Max:98.2 °F (36.8 °C)      Last 24hr Input/Output:    Intake/Output Summary (Last 24 hours) at 2025 0920  Last data filed at 2025 0735  Gross per 24 hour   Intake 180 ml   Output 3650 ml   Net -3470 ml        Pre-op Workup    Carotid duplex,25 :     Mild (<50%) stenosis in the right internal carotid artery.  Mild and heterogeneous plaque in the right internal carotid artery.    Mild (<50%) stenosis in the left internal carotid artery.  Mild and heterogeneous plaque in the left internal carotid artery.    Normal antegrade flow involving the right vertebral artery.    Normal antegrade flow involving the left vertebral artery.    Cardiac cath: Pending.    Echocardiogram: 25    ECHO (TTE) LIMITED (PRN CONTRAST/BUBBLE/STRAIN/3D) 2025  2:42 PM (Final)    Interpretation Summary    Left Ventricle: Reduced left ventricular systolic function with a visually estimated EF of 35 - 40%. Left ventricle size is normal. Moderately increased wall thickness. Global hypokinesis present.    Right Ventricle: Right ventricle size is normal. Normal systolic function.    Aortic Valve: Not well visualized. Calcified cusps. Mild regurgitation. Severe stenosis of the aortic valve by provided gradient. AV mean gradient is 68 mmHg. AV area by continuity VTI is 0.7 cm2.    Left Atrium: Left atrium is dilated.    Right      Severe AS, mild AI and dilation of ascending thoracic aorta of 4.7 cm: Cardiac workup in process. Pending cath as below. TAVR CTA complete- will push images to reps and have Dr. Soto review this afternoon.   Acute hypoxic respiratory failure, multifactorial as below, improving: O2 status as above. Per primary team for now.   Acute HFrEF, NYHA class IV, LVEF 35-40%, improving: No meds PTA. Diuresis per primary team/cards . GDMT as tolerated. Strict I&O. Daily standing weights.   Elevated troponin: Needs cath after after respiratory status improves.   COPD exacerbation: patient of Dr. Rashawn Adams and their team is currently on board.  On Trelegy Ellipta PTA. On Solumedrol per primary team/pulmonary.  Scant hemoptysis: Thought 2/2 acute bronchitis. H&H stable as of 5/14/25.   Mild bilateral carotid stenosis: Continue ASA. OP follow up with Vascular.   Asymetric edema and concern for DVT: Venous duplex and CTA chest negative for DVT/PE.   Hyperglycemia without a history of DM: In the setting of systemic steroids for above.   Leukocytosis, likely steroid induced: Resolved.   Tobacco abuse: Advise cessation. Nicotine patch as needed.   Alcohol abuse: Advise cessation. Monitor for withdrawal and initiate CIWA protocol if needed.   Cocaine use: Advise cessation.      Fluid and electrolytes: PO. Maintain K+ >4 and magnesium level >2.  Nutrition: ADULT DIET; Regular; 3 carb choices (45 gm/meal)  Activity: OOB all meals and ambulate in halls TID; IS 10-15 x an hour while awake.  Bowel Regimen: Per primary team.   GI ppx: Per primary team.  DVT ppx: SCDs  Dispo:  Per primary team  Case discussed with:  Dr. Medley    I spent a total of 25 minutes chart reviewing, interviewing patient, assessing patient, as well as devising and documenting a plan of care.     Signed By: BENJAMIN Gamboa - NP

## 2025-05-16 NOTE — PROGRESS NOTES
Hospitalist Progress Note    NAME:   Jose Díaz III   : 1965   MRN: [id number]     Date/Time: 2025 3:05 PM  Patient PCP: No primary care provider on file.    Estimated discharge date:   Barriers: Improvement of hypoxia    ICU COURSE :   59 M smoker (1 PPD x approx 40 yrs) with history of COPD on Trelegy inhaler who at baseline is highly functional, employed as an . He admits to approx 2 beers per day and occasional recreational substance use. Admitted in transfer from [facility] with acute hypoxemic respiratory failure. Symptoms began approx 2 weeks ago with exertional dyspnea, cough, yellow mucus, scant hemoptysis. He was treated by his primary care provider as COPD exacerbation with prednisone and antibiotics and noted some improvement but not resolution. In the past 2-3 days he has had increasing dyspnea which progressed to rest dyspnea with orthopnea noted on the night prior to admission. In the [facility] ED, he was hypoxic and tachypneic. His CXR revealed CM with mild interstitial prominence. His EKG revealed changes consistent with LVH and T wave inversions in V5, V6. Troponin was modestly elevated in mid 200s, BNP > 10,000. He received furosemide and bronchodilators with transient improvement, then deterioration requiring NIPPV. Therefore, [facility] CCM was consulted and accepted the patient in transfer. Upon arrival to the ICU, he was placed on NC O2. Although tachypneic (RR aprox 26/min) he was able to speak in full sentences with SpO2 94% 0n 6 LPM NC. However, he desaturated significantly when sitting to side of the bed to urinate. Therefore, NIPPV was resumed. His initial exam revealed distant breath sounds with few scattered wheezes and asymmetric R>L ankle edema. Admitting diagnosis was acute hypoxic resp failure of unclear etiology and likely multifactorial - CHF, COPD exac and rule out VTE       Assessment / Plan:  Acute hypoxic respiratory failure  Acute COPD exacerbation  Chronic  95 20 94 % -- --   05/16/25 1057 100/84 -- -- 87 19 91 % -- --   05/16/25 1054 103/84 98.3 °F (36.8 °C) Oral 89 26 93 % -- --   05/16/25 0927 104/84 -- -- -- -- -- -- --   05/16/25 0827 -- -- -- 87 20 97 % -- --   05/16/25 0817 -- -- -- -- -- (!) 88 % -- --   05/16/25 0737 -- -- -- 84 20 (!) 89 % -- --   05/16/25 0735 111/86 -- -- 85 26 91 % -- --   05/16/25 0730 (!) 119/91 98 °F (36.7 °C) Oral 84 21 90 % -- --   05/16/25 0515 -- -- -- 90 28 91 % 1.829 m (6' 0.01\") 98.2 kg (216 lb 7.9 oz)   05/16/25 0346 (!) 136/102 -- -- 81 20 93 % -- --   05/16/25 0345 (!) 127/96 -- -- 82 20 93 % -- --   05/16/25 0330 -- 98.1 °F (36.7 °C) Axillary 78 19 95 % -- --   05/16/25 0257 -- -- -- 79 -- 91 % -- --   05/16/25 0200 -- -- -- 84 -- -- -- --   05/15/25 2300 -- -- -- 86 18 -- -- --   05/15/25 2253 -- -- -- 94 28 96 % -- --   05/15/25 2248 (!) 155/85 98 °F (36.7 °C) Oral -- -- -- -- --   05/15/25 1956 104/77 98.2 °F (36.8 °C) Oral 97 25 90 % -- --   05/15/25 1954 -- -- -- 97 -- 91 % -- --   05/15/25 1932 -- -- -- 92 14 90 % -- --   05/15/25 1930 104/77 -- -- 93 26 -- -- --   05/15/25 1815 (!) 119/94 -- -- (!) 101 17 93 % -- --   05/15/25 1600 -- -- -- 88 24 90 % -- --   05/15/25 1525 108/80 98.2 °F (36.8 °C) Oral 89 24 (!) 89 % -- --         Intake/Output Summary (Last 24 hours) at 5/16/2025 1505  Last data filed at 5/16/2025 1346  Gross per 24 hour   Intake --   Output 3525 ml   Net -3525 ml        I had a face to face encounter and independently examined this patient on 5/16/2025, as outlined below:  PHYSICAL EXAM:  General: Alert, cooperative  EENT:  EOMI. Anicteric sclerae. On o2 via nasal cannula 8 lpm   Resp:  Air entry is diminished, wheezing present, crackles present  CV:  Regular  rhythm,  trace rt le edema   GI:  Soft, Non distended, Non tender.  +Bowel sounds  Neurologic:  Alert and awake, normal speech  Psych:   Pleasant  Skin:  No rashes.  No jaundice    Reviewed most current lab test results and cultures

## 2025-05-16 NOTE — PROGRESS NOTES
**OR** ondansetron (ZOFRAN) injection 4 mg, 4 mg, IntraVENous, Q6H PRN, Rafa Shabazz MD    polyethylene glycol (GLYCOLAX) packet 17 g, 17 g, Oral, Daily PRN, Rafa Shabazz MD    acetaminophen (TYLENOL) tablet 650 mg, 650 mg, Oral, Q6H PRN **OR** acetaminophen (TYLENOL) suppository 650 mg, 650 mg, Rectal, Q6H PRN, Rafa Shabazz MD    glucose chewable tablet 16 g, 4 tablet, Oral, PRN, Rafa Shabazz MD    dextrose bolus 10% 125 mL, 125 mL, IntraVENous, PRN **OR** dextrose bolus 10% 250 mL, 250 mL, IntraVENous, PRN, Rafa Shabazz MD    glucagon injection 1 mg, 1 mg, SubCUTAneous, PRN, Rafa Shabazz MD    dextrose 10 % infusion, , IntraVENous, Continuous PRN, Rafa Shabazz MD    insulin lispro (HUMALOG,ADMELOG) injection vial 0-8 Units, 0-8 Units, SubCUTAneous, 4x Daily AC & HS, Rafa Shabazz MD, 2 Units at 05/12/25 7908      Documented Home Medications:  Prior to Admission medications    Medication Sig Start Date End Date Taking? Authorizing Provider   fluticasone-umeclidin-vilant (TRELEGY ELLIPTA) 100-62.5-25 MCG/ACT AEPB inhaler Inhale 1 puff into the lungs daily   Yes Provider, Lisa, MD        History: includes:  Past Medical History:   Diagnosis Date    COPD (chronic obstructive pulmonary disease) (HCC)       No past surgical history on file.   Social History     Tobacco Use    Smoking status: Every Day     Current packs/day: 1.00     Average packs/day: 1 pack/day for 40.4 years (40.4 ttl pk-yrs)     Types: Cigarettes     Start date: 1985    Smokeless tobacco: Not on file   Substance Use Topics    Alcohol use: Yes     Alcohol/week: 14.0 standard drinks of alcohol     Types: 14 Cans of beer per week      Family History   Adopted: Yes         ROS:Pertinent items are noted in HPI.    Objective:     Vital Signs: Telemetry:    normal sinus rhythm Intake/Output:   /84   Pulse 92   Temp 98.3 °F (36.8 °C) (Oral)   Resp 24   Ht 1.829 m (6' 0.01\")   Wt 98.2 kg (216 lb 7.9 oz)   Bilateral lower lobe, right middle lobe, and lingular centrilobular nodules.   Decreased airspace opacities in the lung bases. There is bronchial wall   thickening in the bilateral lower lobes. The constellation of findings suggest   chronic aspiration however this may also represent indolent infectious process   such as Mycobacterium avium complex.         Electronically signed by Jean Sweeney      Vascular duplex carotid bilateral   Final Result      Vascular ankle brachial index (PATRICIO)         CTA CHEST W WO CONTRAST   Final Result   No evidence of pulmonary embolism. Bilateral lower lobe mucous plugging,   parabronchial soft tissue density, bilateral lower lobe consolidation.   Dilatation of the ascending thoracic aorta, measuring 4.7 cm.         Electronically signed by LAZARO MC      Vascular duplex lower extremity venous bilateral   Final Result    No evidence of bilateral lower extremity deep venous    thrombosis.         XR CHEST PORTABLE   Final Result   No acute cardiopulmonary findings.            Electronically signed by Benjy Shipley            I personally reviewed laboratory testing, pulmonary imaging, radiology reports, and pulse oximetry data.    Please note that this dictation was completed with "[employer] the computer voice recognition software.  Quite often unanticipated grammatical, syntax, homophones, and other interpretive errors are inadvertently transcribed by the computer software.  Please disregard these errors.  Please excuse any errors that have escaped final proofreading  ______________________________________________________________________      Thank you for allowing us to participate in the care of this patient.      This care involved high complexity medical decision making: I personally:  Reviewed the flowsheet and previous days notes  Reviewed and summarized records or history from previous days note or discussions with staff, family  High Risk Drug therapy requiring intensive

## 2025-05-16 NOTE — PROGRESS NOTES
Predictive Model Details          31  Factor Value    Calculated 5/16/2025 03:53 36% Supplemental oxygen Oxygen    Deterioration Index Model 32% Age 59 years old     18% Respiratory rate 20     4% Systolic 136     3% Pulse oximetry 93 %     3% BUN abnormal (25 MG/DL)     2% Sodium 138 mmol/L     1% Blood pH 7.41       1% Hematocrit 49.0 %     0% Pulse 81     0% WBC count 7.4 K/uL     0% Potassium 3.9 mmol/L     0% Temperature 98.1 °F (36.7 °C)       End of Shift Note    Bedside shift change report given to KIKE Kelly (oncoming nurse) by Maricarmen Vargas RN (offgoing nurse).  Report included the following information SBAR, ED Summary, Intake/Output, MAR, Recent Results, Med Rec Status, Cardiac Rhythm NSR/Tachy, Alarm Parameters , and Quality Measures    Shift worked:  Night      Shift summary and any significant changes:    Unremarkable shift   Am labs pending   Concerns for physician to address: None     Zone phone for oncoming shift:  [phone]       Activity:  Level of Assistance: Minimal assist, patient does 75% or more  Number times ambulated in hallways past shift: 0  Number of times OOB to chair past shift: 3    Cardiac:   Cardiac Monitoring: Yes      Cardiac Rhythm: Sinus tachy, Sinus rhythm    Access:  Current line(s): PIV     Genitourinary:   Urinary Status: Voiding, Bathroom privileges    Respiratory:   O2 Device: PAP (positive airway pressure)  Chronic home O2 use?: NO  Incentive spirometer at bedside: YES    GI:  Last BM (including prior to admit): 05/15/25  Current diet:  ADULT DIET; Regular; 3 carb choices (45 gm/meal)  Passing flatus: YES    Pain Management:   Patient states pain is manageable on current regimen: YES    Skin:  Po Scale Score: 20  Interventions: Wound Offloading (Prevention Methods): Bed, pressure reduction mattress, Repositioning, Pillows, Elevate heels    Patient Safety:  Fall Risk: Nursing Judgement-Fall Risk High(Add Comments): Yes  Fall Risk Interventions  Nursing Judgement-Fall Risk

## 2025-05-16 NOTE — PROGRESS NOTES
Bedside shift change report given to KIKE Kelly (oncoming nurse) by KIKE Hernadez (offgoing nurse). Report included the following information Nurse Handoff Report and Cardiac Rhythm NSR to sinus tachycardia .     1157--KIKE Barksdale will be taking over charting for this patient.     1934--This RN agrees with KIKE Barksdale's charting.     End of Shift Note    Bedside shift change report given to KIKE Pantoja  (oncoming nurse) by Leticia García RN and KKIE Barksdale (offgoing nurse).  Report included the following information SBAR    Shift worked:  Day shift     Shift summary and any significant changes:     Oxygen weaned to 5 liters NC. Patient tolerating well.    Concerns for physician to address:  --plan from cardiology/cardiothoracic team  --continued respiratory improvement   Zone phone for oncoming shift:   [phone]       Activity:  Level of Assistance: Minimal assist, patient does 75% or more  Number times ambulated in hallways past shift: 0  Number of times OOB to chair past shift: 0    Cardiac:   Cardiac Monitoring: Yes      Cardiac Rhythm: Sinus rhythm    Access:  Current line(s): PIV     Genitourinary:   Urinary Status: Voiding    Respiratory:   O2 Device: High flow nasal cannula  Chronic home O2 use?: NO  Incentive spirometer at bedside:     GI:  Last BM (including prior to admit): 05/15/25  Current diet:  ADULT DIET; Regular; 3 carb choices (45 gm/meal)  Passing flatus: YES    Pain Management:   Patient states pain is manageable on current regimen: YES    Skin:  Po Scale Score: 21  Interventions: Wound Offloading (Prevention Methods): Pillows, Repositioning    Patient Safety:  Fall Risk: Nursing Judgement-Fall Risk High(Add Comments): Yes  Fall Risk Interventions  Nursing Judgement-Fall Risk High(Add Comments): Yes  Toilet Every 2 Hours-In Advance of Need: Yes  Hourly Visual Checks: Awake, In bed  Fall Visual Posted: Socks  Room Door Open: Yes  Alarm On: Other (Comment) (refusal)  Patient Moved Closer to Nursing Station:  No    Active Consults:   IP CONSULT TO SPIRITUAL SERVICES  IP CONSULT TO CARDIOLOGY  IP CONSULT TO PULMONOLOGY  IP CONSULT TO THORACIC SURGERY    Length of Stay:  Expected LOS: 8  Actual LOS: 5    Leticia García RN

## 2025-05-16 NOTE — PROGRESS NOTES
Cardiology Progress Note  5/16/2025     Admit Date: 5/11/2025  Admit Diagnosis: Acute respiratory failure with hypoxemia (HCC) [J96.01]  Acute hypoxemic respiratory failure (HCC) [J96.01]  CC: none currently   Cardiac Assessment/Plan:    1) AS: severe by provided doppler on echo, but difficult to see AoV  2) Elevated trop (200s): LVH w/ repol changes on ecg; atypical CP/SHAY.  3) CM: EF 35-40% on echo; Nl TSH.  4) TAA: ascending Ao 5.1 cm 2/2024. 4.7 5/2025     5) COPD, Severe per Dr De La Cruz; ongoing tobacco use  6) EtOH excess; cocaine use per report     Admitted w/ resp failure as noted below; improved w/ diuretics/steroids.    Rec 5/12: Needs CTA of Ao; Will need cath to eval cors after resp status improves.         Echo 5/12/25:    Left Ventricle: Reduced left ventricular systolic function with a visually estimated EF of 35 - 40%. Left ventricle size is normal. Moderately increased wall thickness. Global hypokinesis present.    Right Ventricle: Right ventricle size is normal. Normal systolic function.    Aortic Valve: Not well visualized. Calcified cusps. Mild regurgitation. Severe stenosis of the aortic valve by provided gradient. AV mean gradient is 68 mmHg. AV area by continuity VTI is 0.7 cm2.    Left Atrium: Left atrium is dilated.    Right Atrium: Right atrium is dilated.    CTA 5/12/25:  No evidence of pulmonary embolism. Bilateral lower lobe mucous plugging, parabronchial soft tissue density, bilateral lower lobe consolidation. Dilatation of the ascending thoracic aorta, measuring 4.7 cm.    5/13: no CP; less dyspnea; Eating a fast-food sausage biscuit     BP 91-110s; HR 80-90s; Sinus; 91% 5L; excellent UOP  K 4.1; Cr 0.8; Alb 2.8; WBC 13; Hg 14.3.    5/15: No CP; less dyspnea overall but still on high dose O2.  -110s; HR ; Sinus; 89% 35L;   K 3.9; Cr 0.9;     5/16:  No CP; less dypnea overall; remains on high dose O2  -150; HR 70-80s; sinus; 89-91% 45L; excellent

## 2025-05-17 LAB
ANION GAP SERPL CALC-SCNC: 7 MMOL/L (ref 2–12)
BASOPHILS # BLD: 0.03 K/UL (ref 0–0.1)
BASOPHILS NFR BLD: 0.2 % (ref 0–1)
BUN SERPL-MCNC: 28 MG/DL (ref 6–20)
BUN/CREAT SERPL: 33 (ref 12–20)
CALCIUM SERPL-MCNC: 8.7 MG/DL (ref 8.5–10.1)
CHLORIDE SERPL-SCNC: 102 MMOL/L (ref 97–108)
CO2 SERPL-SCNC: 27 MMOL/L (ref 21–32)
CREAT SERPL-MCNC: 0.85 MG/DL (ref 0.7–1.3)
DIFFERENTIAL METHOD BLD: ABNORMAL
EOSINOPHIL # BLD: 0 K/UL (ref 0–0.4)
EOSINOPHIL NFR BLD: 0 % (ref 0–7)
ERYTHROCYTE [DISTWIDTH] IN BLOOD BY AUTOMATED COUNT: 14.1 % (ref 11.5–14.5)
GLUCOSE BLD STRIP.AUTO-MCNC: 147 MG/DL (ref 65–117)
GLUCOSE BLD STRIP.AUTO-MCNC: 154 MG/DL (ref 65–117)
GLUCOSE BLD STRIP.AUTO-MCNC: 162 MG/DL (ref 65–117)
GLUCOSE BLD STRIP.AUTO-MCNC: 90 MG/DL (ref 65–117)
GLUCOSE SERPL-MCNC: 148 MG/DL (ref 65–100)
HCT VFR BLD AUTO: 48.4 % (ref 36.6–50.3)
HGB BLD-MCNC: 15.3 G/DL (ref 12.1–17)
IMM GRANULOCYTES # BLD AUTO: 0.13 K/UL (ref 0–0.04)
IMM GRANULOCYTES NFR BLD AUTO: 1.1 % (ref 0–0.5)
LYMPHOCYTES # BLD: 0.6 K/UL (ref 0.8–3.5)
LYMPHOCYTES NFR BLD: 5 % (ref 12–49)
MCH RBC QN AUTO: 30.8 PG (ref 26–34)
MCHC RBC AUTO-ENTMCNC: 31.6 G/DL (ref 30–36.5)
MCV RBC AUTO: 97.6 FL (ref 80–99)
MONOCYTES # BLD: 0.51 K/UL (ref 0–1)
MONOCYTES NFR BLD: 4.2 % (ref 5–13)
NEUTS SEG # BLD: 10.84 K/UL (ref 1.8–8)
NEUTS SEG NFR BLD: 89.5 % (ref 32–75)
NRBC # BLD: 0 K/UL (ref 0–0.01)
NRBC BLD-RTO: 0 PER 100 WBC
PLATELET # BLD AUTO: 211 K/UL (ref 150–400)
PMV BLD AUTO: 9.5 FL (ref 8.9–12.9)
POTASSIUM SERPL-SCNC: 4.1 MMOL/L (ref 3.5–5.1)
RBC # BLD AUTO: 4.96 M/UL (ref 4.1–5.7)
SERVICE CMNT-IMP: ABNORMAL
SERVICE CMNT-IMP: NORMAL
SODIUM SERPL-SCNC: 136 MMOL/L (ref 136–145)
WBC # BLD AUTO: 12.1 K/UL (ref 4.1–11.1)

## 2025-05-17 PROCEDURE — 94640 AIRWAY INHALATION TREATMENT: CPT

## 2025-05-17 PROCEDURE — 85025 COMPLETE CBC W/AUTO DIFF WBC: CPT

## 2025-05-17 PROCEDURE — 2060000000 HC ICU INTERMEDIATE R&B

## 2025-05-17 PROCEDURE — 82962 GLUCOSE BLOOD TEST: CPT

## 2025-05-17 PROCEDURE — 36415 COLL VENOUS BLD VENIPUNCTURE: CPT

## 2025-05-17 PROCEDURE — 6360000002 HC RX W HCPCS: Performed by: INTERNAL MEDICINE

## 2025-05-17 PROCEDURE — 2500000003 HC RX 250 WO HCPCS: Performed by: INTERNAL MEDICINE

## 2025-05-17 PROCEDURE — 80048 BASIC METABOLIC PNL TOTAL CA: CPT

## 2025-05-17 PROCEDURE — 6370000000 HC RX 637 (ALT 250 FOR IP): Performed by: STUDENT IN AN ORGANIZED HEALTH CARE EDUCATION/TRAINING PROGRAM

## 2025-05-17 PROCEDURE — 6370000000 HC RX 637 (ALT 250 FOR IP): Performed by: INTERNAL MEDICINE

## 2025-05-17 PROCEDURE — 6360000002 HC RX W HCPCS: Performed by: PHYSICIAN ASSISTANT

## 2025-05-17 PROCEDURE — 2500000003 HC RX 250 WO HCPCS: Performed by: PHYSICIAN ASSISTANT

## 2025-05-17 PROCEDURE — 2700000000 HC OXYGEN THERAPY PER DAY

## 2025-05-17 RX ORDER — PREDNISONE 20 MG/1
20 TABLET ORAL DAILY
Status: DISCONTINUED | OUTPATIENT
Start: 2025-05-17 | End: 2025-05-19

## 2025-05-17 RX ADMIN — WATER 40 MG: 1 INJECTION INTRAMUSCULAR; INTRAVENOUS; SUBCUTANEOUS at 11:33

## 2025-05-17 RX ADMIN — ENOXAPARIN SODIUM 30 MG: 100 INJECTION SUBCUTANEOUS at 20:43

## 2025-05-17 RX ADMIN — IPRATROPIUM BROMIDE 0.5 MG: 0.5 SOLUTION RESPIRATORY (INHALATION) at 20:01

## 2025-05-17 RX ADMIN — ENOXAPARIN SODIUM 30 MG: 100 INJECTION SUBCUTANEOUS at 08:38

## 2025-05-17 RX ADMIN — SODIUM CHLORIDE, PRESERVATIVE FREE 10 ML: 5 INJECTION INTRAVENOUS at 08:55

## 2025-05-17 RX ADMIN — SODIUM CHLORIDE, PRESERVATIVE FREE 10 ML: 5 INJECTION INTRAVENOUS at 20:43

## 2025-05-17 RX ADMIN — PREDNISONE 20 MG: 20 TABLET ORAL at 12:44

## 2025-05-17 RX ADMIN — ARFORMOTEROL TARTRATE: 15 SOLUTION RESPIRATORY (INHALATION) at 20:06

## 2025-05-17 RX ADMIN — IPRATROPIUM BROMIDE 0.5 MG: 0.5 SOLUTION RESPIRATORY (INHALATION) at 08:09

## 2025-05-17 RX ADMIN — FUROSEMIDE 60 MG: 10 INJECTION, SOLUTION INTRAMUSCULAR; INTRAVENOUS at 18:08

## 2025-05-17 RX ADMIN — PANTOPRAZOLE SODIUM 40 MG: 40 TABLET, DELAYED RELEASE ORAL at 06:17

## 2025-05-17 RX ADMIN — ARFORMOTEROL TARTRATE: 15 SOLUTION RESPIRATORY (INHALATION) at 08:09

## 2025-05-17 RX ADMIN — FUROSEMIDE 60 MG: 10 INJECTION, SOLUTION INTRAMUSCULAR; INTRAVENOUS at 08:39

## 2025-05-17 ASSESSMENT — PAIN SCALES - GENERAL: PAINLEVEL_OUTOF10: 0

## 2025-05-17 NOTE — PROGRESS NOTES
[facility] FLOOR (Pre-op) Progress Note    Admit Date: 2025  POD: * No surgery found *      Procedure:        Subjective/overnight events:   Pt discussed with Dr. Soto, seen in bed. In ST, on 4 liters via nasal cannula, afebrile. Vital signs stable. No events overnight. No complaints at present.    Objective:     /84   Pulse 85   Temp 98.3 °F (36.8 °C) (Oral)   Resp 18   Ht 1.829 m (6' 0.01\")   Wt 98.2 kg (216 lb 7.9 oz)   SpO2 92%   BMI 29.36 kg/m²   Temp (24hrs), Av.3 °F (36.8 °C), Min:97.8 °F (36.6 °C), Max:98.7 °F (37.1 °C)      Last 24hr Input/Output:    Intake/Output Summary (Last 24 hours) at 2025 1404  Last data filed at 2025 1047  Gross per 24 hour   Intake 880 ml   Output 2150 ml   Net -1270 ml        Pre-op Workup    Carotid duplex,25 :     Mild (<50%) stenosis in the right internal carotid artery.  Mild and heterogeneous plaque in the right internal carotid artery.    Mild (<50%) stenosis in the left internal carotid artery.  Mild and heterogeneous plaque in the left internal carotid artery.    Normal antegrade flow involving the right vertebral artery.    Normal antegrade flow involving the left vertebral artery.    Cardiac cath: Pending.    Echocardiogram: 25    ECHO (TTE) LIMITED (PRN CONTRAST/BUBBLE/STRAIN/3D) 2025  2:42 PM (Final)    Interpretation Summary    Left Ventricle: Reduced left ventricular systolic function with a visually estimated EF of 35 - 40%. Left ventricle size is normal. Moderately increased wall thickness. Global hypokinesis present.    Right Ventricle: Right ventricle size is normal. Normal systolic function.    Aortic Valve: Not well visualized. Calcified cusps. Mild regurgitation. Severe stenosis of the aortic valve by provided gradient. AV mean gradient is 68 mmHg. AV area by continuity VTI is 0.7 cm2.    Left Atrium: Left atrium is dilated.    Right Atrium: Right atrium is dilated.    Signed by: Car Cooley MD on 2025  2:42       Risk Factors      Points   Hypertension  Uncontrolled, >160 mmHg systolic    No=0   Renal disease  Dialysis, transplant, Cr>2.26 mg/dL or >200 µmol/L    No=0   Liver disease   Cirrhosis or bilirubin >2x normal with AST/ALT/AP >3x normal    No=0   Stroke history    No=0   Prior major bleeding or predisposition to bleeding     No=0   Labile  INR  Unstable/high INRs, time in therapeutic range <60%    No=0   Age >65    No=0   Medication usage predisposing to bleeding   Aspirin, clopidogrel, NSAIDs    No=0   Alcohol use   >7 drinks/week    Yes=1      HAS-BLED Score:    1:  Risk was 3.4% in one validation study and 1.02 bleeds per 100 patient-years in another validation study.      Has-BLED score: 1 low risk, 2 moderate risk, 3 or greater high risk for bleeding and anticoagulation should not be given         STS: Pending completion of workup         Admission Weight: Last Weight   Weight - Scale: 110 kg (242 lb 8.1 oz) Weight - Scale: 98.2 kg (216 lb 7.9 oz)       EXAM:      General: awake, alert, and oriented   Lungs:    wheezes bilaterally   Incision:  N/A   Heart:   Regular rate and rhythm   Abdomen:    soft, not-distended, non-tender   Extremities:  1+ pitting edema   Neurologic:  Gross motor and sensory apparatus intact       Lab Data Reviewed:   Recent Labs     05/14/25  1704 05/15/25  0625 05/17/25  0618   WBC  --   --  12.1*   HGB  --   --  15.3   HCT  --   --  48.4   PLT  --   --  211   NA  --    < > 136   K  --    < > 4.1   BUN  --    < > 28*   INR 1.0  --   --     < > = values in this interval not displayed.         Assessment:     Patient Active Problem List   Diagnosis    Acute hypoxemic respiratory failure (HCC)    Acute respiratory failure with hypoxemia (HCC)    Bilateral carotid artery stenosis    Preop cardiovascular exam       Plan/Recommendations/Medical Decision Making:     Severe AS of likely bicuspid valve, mild AI and dilation of ascending thoracic aorta of 4.7 cm: Final surgical plan pending

## 2025-05-17 NOTE — PROGRESS NOTES
Cardiology Progress Note  5/17/2025     Admit Date: 5/11/2025  Admit Diagnosis: Acute respiratory failure with hypoxemia (HCC) [J96.01]  Acute hypoxemic respiratory failure (HCC) [J96.01]  CC: none currently   Cardiac Assessment/Plan:    1) AS: severe by provided doppler on echo, but difficult to see AoV  2) Elevated trop (200s): LVH w/ repol changes on ecg; atypical CP/SHAY.  3) CM: EF 35-40% on echo; Nl TSH.  4) TAA: ascending Ao 5.1 cm 2/2024. 4.7 5/2025     5) COPD, Severe per Dr De La Cruz; ongoing tobacco use  6) EtOH excess; cocaine use per report     Admitted w/ resp failure as noted below; improved w/ diuretics/steroids.    Rec 5/12: Needs CTA of Ao; Will need cath to eval cors after resp status improves.         Echo 5/12/25:    Left Ventricle: Reduced left ventricular systolic function with a visually estimated EF of 35 - 40%. Left ventricle size is normal. Moderately increased wall thickness. Global hypokinesis present.    Right Ventricle: Right ventricle size is normal. Normal systolic function.    Aortic Valve: Not well visualized. Calcified cusps. Mild regurgitation. Severe stenosis of the aortic valve by provided gradient. AV mean gradient is 68 mmHg. AV area by continuity VTI is 0.7 cm2.    Left Atrium: Left atrium is dilated.    Right Atrium: Right atrium is dilated.    CTA 5/12/25:  No evidence of pulmonary embolism. Bilateral lower lobe mucous plugging, parabronchial soft tissue density, bilateral lower lobe consolidation. Dilatation of the ascending thoracic aorta, measuring 4.7 cm.    5/13: no CP; less dyspnea; Eating a fast-food sausage biscuit     BP 91-110s; HR 80-90s; Sinus; 91% 5L; excellent UOP  K 4.1; Cr 0.8; Alb 2.8; WBC 13; Hg 14.3.    5/15: No CP; less dyspnea overall but still on high dose O2.  -110s; HR ; Sinus; 89% 35L;   K 3.9; Cr 0.9;     5/16:  No CP; less dypnea overall; remains on high dose O2  -150; HR 70-80s; sinus; 89-91% 45L; excellent

## 2025-05-17 NOTE — PROGRESS NOTES
Hospitalist Progress Note    NAME:   Jose Díaz III   : 1965   MRN: [id number]     Date/Time: 2025 4:37 PM  Patient PCP: No primary care provider on file.    Estimated discharge date:   Barriers: Improvement of hypoxia    ICU COURSE :   59 M smoker (1 PPD x approx 40 yrs) with history of COPD on Trelegy inhaler who at baseline is highly functional, employed as an . He admits to approx 2 beers per day and occasional recreational substance use. Admitted in transfer from [facility] with acute hypoxemic respiratory failure. Symptoms began approx 2 weeks ago with exertional dyspnea, cough, yellow mucus, scant hemoptysis. He was treated by his primary care provider as COPD exacerbation with prednisone and antibiotics and noted some improvement but not resolution. In the past 2-3 days he has had increasing dyspnea which progressed to rest dyspnea with orthopnea noted on the night prior to admission. In the [facility] ED, he was hypoxic and tachypneic. His CXR revealed CM with mild interstitial prominence. His EKG revealed changes consistent with LVH and T wave inversions in V5, V6. Troponin was modestly elevated in mid 200s, BNP > 10,000. He received furosemide and bronchodilators with transient improvement, then deterioration requiring NIPPV. Therefore, [facility] CCM was consulted and accepted the patient in transfer. Upon arrival to the ICU, he was placed on NC O2. Although tachypneic (RR aprox 26/min) he was able to speak in full sentences with SpO2 94% 0n 6 LPM NC. However, he desaturated significantly when sitting to side of the bed to urinate. Therefore, NIPPV was resumed. His initial exam revealed distant breath sounds with few scattered wheezes and asymmetric R>L ankle edema. Admitting diagnosis was acute hypoxic resp failure of unclear etiology and likely multifactorial - CHF, COPD exac and rule out VTE       Assessment / Plan:    Acute hypoxic respiratory failure POA required 50 L, 60% oxygen by  sounds  Neurologic:  Alert and awake, normal speech  Psych:   Pleasant  Skin:  No rashes.  No jaundice    Reviewed most current lab test results and cultures  YES  Reviewed most current radiology test results   YES  Review and summation of old records today    NO  Reviewed patient's current orders and MAR    YES  PMH/SH reviewed - no change compared to H&P    Procedures: see electronic medical records for all procedures/Xrays and details which were not copied into this note but were reviewed prior to creation of Plan.      LABS:  I reviewed today's most current labs and imaging studies.  Pertinent labs include:  Recent Labs     05/17/25  0618   WBC 12.1*   HGB 15.3   HCT 48.4        Recent Labs     05/14/25  1704 05/15/25  0625 05/16/25  0340 05/17/25  0618   NA  --  138 136 136   K  --  3.9 4.3 4.1   CL  --  101 102 102   CO2  --  32 30 27   GLUCOSE  --  98 154* 148*   BUN  --  25* 29* 28*   CREATININE  --  0.87 0.77 0.85   CALCIUM  --  9.2 9.0 8.7   MG 2.2  --   --   --    INR 1.0  --   --   --        Signed: Aleksandr River Jr, MD

## 2025-05-17 NOTE — PROGRESS NOTES
Pulmonary, Critical Care, and Sleep Medicine      Name: Jose Díaz III MRN: [id number]   : 1965 Hospital: [facility]   Date: 2025  Admission date: 2025 Hospital Day: 7   Patient PCP: No primary care provider on file.    History:   Pt is acutely ill and unstable. Medical records and data reviewed. Pt seen in consultation    IMPRESSION:   Thoracic aortic aneurysm. 5.1 cm. For cardiac cath on Monday.   Acute respiratory failure with Hypoxia -has been able to wean the oxygen level.  Severe COPD followed by Dr. Rashawn Hankins, PFT 2024 FEV1 1.98 (52%) TLC of 104  DLCO 89; on Trelegy and as needed albuterol; no eosinophilia; recent exacerbation treated with steroids  Tobacco use disorder ongoing but significantly reduced, Cocaine abuse recent  Acute on chronic HFrEF 35-45%  Severe aortic stenosis  EtOH use regular-3-4 beers per day. He is being monitor for Etoh withdrawal as well.   Body mass index is 29.36 kg/m².        RECOMMENDATIONS/PLAN:   Will wean to oral steroids.   Empiric levofloxacin   Oxygen to maintain goal saturation >88%, currently on 6L NC, Wean as able.   O2 challenge prior to discharge  As needed BiPAP for high oxygen needs or respiratory distress(does not seem to need this anymore)  Overnight oximetry while here to screen for obstructive sleep apnea  Continue equivalent of Trelegy by nebulization while in hospital resume Trelegy at home   Change albuterol as needed to levalbuterol to avoid potential tachycardia that might aggravate his cardiac condition  May need a nebulizer at home with levalbuterol as needed   Fluid management per Cardiology  Cardiology following-plan for cardiac cath on Monday.   Cardiothoracic surgery following   DVT prophylaxis  Prescription drug management with home med reconciliation reviewed  Thanks you for asking us to see pt while in the hospital    Pt is acutely will, working to wean oxygen.      Interval  history:  5-17-25:  Pt has been feeling better. He feels his breathing is easier. Not much coughing or wheezing this am.   NO chest pain, no back pain, no abdominal pain. Denies much GERD. He is not very fond of the hospital food.  Slept ok last pm. He reports to use the Cocaine before admission.  He is ready for his cardiac cath on Monday.   He seems to be pretty motivated to quit smoking.   He has not had any issues last pm.       5/16: Seen sitting at side of bed eating [employer] this AM. Wore NIV overnight. States he feels better with it. Weaned to 6L, satting 92% this AM. Feels significantly better. No LE edema appreciable.     5/15 Pt seen this morning lying in bed. Wore NIV overnight. Mild dyspnea on exertion when he takes the oxygen off. Has good urine output. On 55%, 35L. Weaned to 45% and 30L.     5/14 Pt seen this morning sitting at edge of bed. States he is feeling better today although requiring more oxygen. Has had increased urine output. Occasional cough with brown mucus. On 60% FIO2, 40L. Weaned to 50% FIO2 and 35L.     5/13/2025: Desaturation this morning.  Requiring 15 L/min nasal cannula oxygen.  Discussed with nursing and respiratory therapy at bedside with family present.  Agree with use of BiPAP.  Coached patient on pursed lip breathing.  Audible high-pitched inspiratory x-ray wheezes but distant.  No rhonchi.  Discussed with attending.  Patient admitted to cocaine use last week.  Too unstable for cardiac cath.  Discussed with cardiology.  Patient very marginal at high risk for sudden acute deterioration.       [x] High complexity decision making was performed  [x] See my orders for details      Initial HPI:      I was asked by Rafa Shabazz MD to see Jose Díaz III  a 59 y.o.   male in consultation for a chief complaint of COPD and respiratory failure    Excerpts from admission 5/11/2025 or consult notes as follows:     \"  \"59 M smoker (1 PPD x approx 40 yrs) with history of COPD

## 2025-05-17 NOTE — PROGRESS NOTES
TRANSFER - OUT REPORT:    Verbal report given to Hipolito STOKES on Jose Díaz III  being transferred to [facility] for routine progression of patient care       Report consisted of patient's Situation, Background, Assessment and   Recommendations(SBAR).     Information from the following report(s) Nurse Handoff Report was reviewed with the receiving nurse.           Lines:   Peripheral IV 05/11/25 Left Antecubital (Active)   Site Assessment Clean, dry & intact 05/17/25 1630   Line Status Flushed;Capped 05/17/25 1630   Line Care Connections checked and tightened 05/17/25 1630   Phlebitis Assessment No symptoms 05/17/25 1630   Infiltration Assessment 0 05/17/25 1630   Alcohol Cap Used Yes 05/17/25 1630   Dressing Status Clean, dry & intact 05/17/25 1630   Dressing Type Transparent 05/17/25 1630   Dressing Intervention New 05/12/25 2000        Opportunity for questions and clarification was provided.      Patient transported with:  Monitor, O2 @ 4lpm, and Registered Nurse

## 2025-05-18 LAB
ANION GAP SERPL CALC-SCNC: 5 MMOL/L (ref 2–12)
BASOPHILS # BLD: 0.03 K/UL (ref 0–0.1)
BASOPHILS NFR BLD: 0.2 % (ref 0–1)
BUN SERPL-MCNC: 37 MG/DL (ref 6–20)
BUN/CREAT SERPL: 47 (ref 12–20)
CALCIUM SERPL-MCNC: 8.7 MG/DL (ref 8.5–10.1)
CHLORIDE SERPL-SCNC: 102 MMOL/L (ref 97–108)
CO2 SERPL-SCNC: 31 MMOL/L (ref 21–32)
CREAT SERPL-MCNC: 0.79 MG/DL (ref 0.7–1.3)
DIFFERENTIAL METHOD BLD: ABNORMAL
EOSINOPHIL # BLD: 0.04 K/UL (ref 0–0.4)
EOSINOPHIL NFR BLD: 0.3 % (ref 0–7)
ERYTHROCYTE [DISTWIDTH] IN BLOOD BY AUTOMATED COUNT: 14.1 % (ref 11.5–14.5)
GLUCOSE BLD STRIP.AUTO-MCNC: 104 MG/DL (ref 65–117)
GLUCOSE BLD STRIP.AUTO-MCNC: 124 MG/DL (ref 65–117)
GLUCOSE BLD STRIP.AUTO-MCNC: 127 MG/DL (ref 65–117)
GLUCOSE BLD STRIP.AUTO-MCNC: 148 MG/DL (ref 65–117)
GLUCOSE SERPL-MCNC: 115 MG/DL (ref 65–100)
HCT VFR BLD AUTO: 48 % (ref 36.6–50.3)
HGB BLD-MCNC: 15.6 G/DL (ref 12.1–17)
IMM GRANULOCYTES # BLD AUTO: 0.15 K/UL (ref 0–0.04)
IMM GRANULOCYTES NFR BLD AUTO: 1.2 % (ref 0–0.5)
LYMPHOCYTES # BLD: 1.1 K/UL (ref 0.8–3.5)
LYMPHOCYTES NFR BLD: 9 % (ref 12–49)
MCH RBC QN AUTO: 31.5 PG (ref 26–34)
MCHC RBC AUTO-ENTMCNC: 32.5 G/DL (ref 30–36.5)
MCV RBC AUTO: 97 FL (ref 80–99)
MONOCYTES # BLD: 0.98 K/UL (ref 0–1)
MONOCYTES NFR BLD: 8 % (ref 5–13)
NEUTS SEG # BLD: 9.94 K/UL (ref 1.8–8)
NEUTS SEG NFR BLD: 81.3 % (ref 32–75)
NRBC # BLD: 0 K/UL (ref 0–0.01)
NRBC BLD-RTO: 0 PER 100 WBC
NT PRO BNP: 3852 PG/ML
PLATELET # BLD AUTO: 204 K/UL (ref 150–400)
PMV BLD AUTO: 9.8 FL (ref 8.9–12.9)
POTASSIUM SERPL-SCNC: 3.6 MMOL/L (ref 3.5–5.1)
RBC # BLD AUTO: 4.95 M/UL (ref 4.1–5.7)
SERVICE CMNT-IMP: ABNORMAL
SERVICE CMNT-IMP: NORMAL
SODIUM SERPL-SCNC: 138 MMOL/L (ref 136–145)
WBC # BLD AUTO: 12.2 K/UL (ref 4.1–11.1)

## 2025-05-18 PROCEDURE — 2500000003 HC RX 250 WO HCPCS: Performed by: INTERNAL MEDICINE

## 2025-05-18 PROCEDURE — 83880 ASSAY OF NATRIURETIC PEPTIDE: CPT

## 2025-05-18 PROCEDURE — 85025 COMPLETE CBC W/AUTO DIFF WBC: CPT

## 2025-05-18 PROCEDURE — 2060000000 HC ICU INTERMEDIATE R&B

## 2025-05-18 PROCEDURE — 6370000000 HC RX 637 (ALT 250 FOR IP): Performed by: STUDENT IN AN ORGANIZED HEALTH CARE EDUCATION/TRAINING PROGRAM

## 2025-05-18 PROCEDURE — 80048 BASIC METABOLIC PNL TOTAL CA: CPT

## 2025-05-18 PROCEDURE — 6360000002 HC RX W HCPCS: Performed by: INTERNAL MEDICINE

## 2025-05-18 PROCEDURE — 6370000000 HC RX 637 (ALT 250 FOR IP): Performed by: INTERNAL MEDICINE

## 2025-05-18 PROCEDURE — 36415 COLL VENOUS BLD VENIPUNCTURE: CPT

## 2025-05-18 PROCEDURE — 94640 AIRWAY INHALATION TREATMENT: CPT

## 2025-05-18 PROCEDURE — 82962 GLUCOSE BLOOD TEST: CPT

## 2025-05-18 PROCEDURE — 2700000000 HC OXYGEN THERAPY PER DAY

## 2025-05-18 RX ORDER — ASPIRIN 81 MG/1
81 TABLET, CHEWABLE ORAL DAILY
Status: DISCONTINUED | OUTPATIENT
Start: 2025-05-18 | End: 2025-05-20 | Stop reason: HOSPADM

## 2025-05-18 RX ORDER — FUROSEMIDE 10 MG/ML
40 INJECTION INTRAMUSCULAR; INTRAVENOUS 2 TIMES DAILY
Status: DISCONTINUED | OUTPATIENT
Start: 2025-05-18 | End: 2025-05-20

## 2025-05-18 RX ADMIN — IPRATROPIUM BROMIDE 0.5 MG: 0.5 SOLUTION RESPIRATORY (INHALATION) at 09:20

## 2025-05-18 RX ADMIN — FUROSEMIDE 40 MG: 10 INJECTION, SOLUTION INTRAMUSCULAR; INTRAVENOUS at 17:59

## 2025-05-18 RX ADMIN — FUROSEMIDE 40 MG: 10 INJECTION, SOLUTION INTRAMUSCULAR; INTRAVENOUS at 10:32

## 2025-05-18 RX ADMIN — ASPIRIN 81 MG: 81 TABLET, CHEWABLE ORAL at 10:32

## 2025-05-18 RX ADMIN — PREDNISONE 20 MG: 20 TABLET ORAL at 10:32

## 2025-05-18 RX ADMIN — ENOXAPARIN SODIUM 30 MG: 100 INJECTION SUBCUTANEOUS at 10:32

## 2025-05-18 RX ADMIN — IPRATROPIUM BROMIDE 0.5 MG: 0.5 SOLUTION RESPIRATORY (INHALATION) at 21:20

## 2025-05-18 RX ADMIN — ARFORMOTEROL TARTRATE: 15 SOLUTION RESPIRATORY (INHALATION) at 21:25

## 2025-05-18 RX ADMIN — SODIUM CHLORIDE, PRESERVATIVE FREE 10 ML: 5 INJECTION INTRAVENOUS at 21:09

## 2025-05-18 RX ADMIN — SODIUM CHLORIDE, PRESERVATIVE FREE 10 ML: 5 INJECTION INTRAVENOUS at 10:32

## 2025-05-18 RX ADMIN — ARFORMOTEROL TARTRATE: 15 SOLUTION RESPIRATORY (INHALATION) at 09:20

## 2025-05-18 RX ADMIN — PANTOPRAZOLE SODIUM 40 MG: 40 TABLET, DELAYED RELEASE ORAL at 05:00

## 2025-05-18 RX ADMIN — ENOXAPARIN SODIUM 30 MG: 100 INJECTION SUBCUTANEOUS at 20:46

## 2025-05-18 NOTE — PROGRESS NOTES
Hospitalist Progress Note    NAME:   Jose Díaz III   : 1965   MRN: [id number]     Date/Time: 2025 11:03 AM  Patient PCP: No primary care provider on file.    Estimated discharge date:   Barriers: Improvement of hypoxia    ICU COURSE :   59 M smoker (1 PPD x approx 40 yrs) with history of COPD on Trelegy inhaler who at baseline is highly functional, employed as an . He admits to approx 2 beers per day and occasional recreational substance use. Admitted in transfer from [facility] with acute hypoxemic respiratory failure. Symptoms began approx 2 weeks ago with exertional dyspnea, cough, yellow mucus, scant hemoptysis. He was treated by his primary care provider as COPD exacerbation with prednisone and antibiotics and noted some improvement but not resolution. In the past 2-3 days he has had increasing dyspnea which progressed to rest dyspnea with orthopnea noted on the night prior to admission. In the [facility] ED, he was hypoxic and tachypneic. His CXR revealed CM with mild interstitial prominence. His EKG revealed changes consistent with LVH and T wave inversions in V5, V6. Troponin was modestly elevated in mid 200s, BNP > 10,000. He received furosemide and bronchodilators with transient improvement, then deterioration requiring NIPPV. Therefore, [facility] CCM was consulted and accepted the patient in transfer. Upon arrival to the ICU, he was placed on NC O2. Although tachypneic (RR aprox 26/min) he was able to speak in full sentences with SpO2 94% 0n 6 LPM NC. However, he desaturated significantly when sitting to side of the bed to urinate. Therefore, NIPPV was resumed. His initial exam revealed distant breath sounds with few scattered wheezes and asymmetric R>L ankle edema. Admitting diagnosis was acute hypoxic resp failure of unclear etiology and likely multifactorial - CHF, COPD exac and rule out VTE       Assessment / Plan:    Acute hypoxic respiratory failure POA required 50 L, 60% oxygen by  5/13/2025  Acute systolic congestive heart failure exacerbation LVEF 35% POA  Severe aortic stenosis  Acute non-STEMI POA Troponin peak 260.  Acute COPD exacerbation POA  Chronic smoking  Hemoptysis  2 weeks of increasing shortness of breath, cough, CAT scan tomorrow   Treated with steroids and antibiotics for COPD exacerbation without improved  Symptoms worsen, went to [facility] ER   Room air sat 77%, appeared in respiratory distress    Required 6 L NC   Lung exam with rales    No wheezing   At bedtime troponin 202   proBNP 13470   Chest x-ray with mild to moderate pulmonary edema   Blood culture x 2 no growth   Rapid PCR for COVID-19, influenza A/B negative   Procalcitonin 0.10  Transferred to [facility] ICU 5/11/2025  IV diuresis continues  S/p IV Solu-Medrol now on p.o. prednisone  Nebulized steroids and bronchodilators  Course of Levaquin  Pulmonology and cardiology following  O2 gradually weaned   5/17 on 3 L nasal cannula  Continue to optimize respiratory status  2D echo showed EF of 35%, severe AS  I wean the oxygen to 2 L, patient says he does not really want to go home on oxygen   Will try and wean off over the next 24 hours  Cardiology still planning for cath in a.m.   Will need plan regarding the severe AS and ascending thoracic aortic aneurysm    4.7 cm dilated ascending aorta  Cardiothoracic surgery following and will provide recommendations after evaluation    Hyperglycemia due to steroids  Glycemic control: NPH, SSI  A1c - 5.2  Continue insulin while on steroid .  , 104, 124 today    Regular alcohol use  Anxiety POA  Monitor for signs of withdrawal  Pain management: acetaminophen  Delirium precautions    Leukocytosis likely steroid related  Cultures drawn on 5/11/2025 negative  COVID and flu negative  Urinalysis on 5/11/2025 normal  Trend daily CBC     Code Status: full  DVT Prophylaxis: Lovenox    Medical Decision Making:   I personally reviewed labs: CBC BMP  I personally

## 2025-05-18 NOTE — PROGRESS NOTES
calculus.  Bones: No destructive bone lesion.  Additional comments: N/A  Impression: 1. CT for preprocedural planning.    2. Bilateral lower lobe, right middle lobe, and lingular centrilobular nodules.  Decreased airspace opacities in the lung bases. There is bronchial wall  thickening in the bilateral lower lobes. The constellation of findings suggest  chronic aspiration however this may also represent indolent infectious process  such as Mycobacterium avium complex.    Electronically signed by Jean Sweeney    CTA TRANSCATHETER AORTIC VALVE REPLACEMENT   Final Result      1. CT for preprocedural planning.      2. Bilateral lower lobe, right middle lobe, and lingular centrilobular nodules.   Decreased airspace opacities in the lung bases. There is bronchial wall   thickening in the bilateral lower lobes. The constellation of findings suggest   chronic aspiration however this may also represent indolent infectious process   such as Mycobacterium avium complex.         Electronically signed by Jean Sweeney      Vascular duplex carotid bilateral   Final Result      Vascular ankle brachial index (PATRICIO)         CTA CHEST W WO CONTRAST   Final Result   No evidence of pulmonary embolism. Bilateral lower lobe mucous plugging,   parabronchial soft tissue density, bilateral lower lobe consolidation.   Dilatation of the ascending thoracic aorta, measuring 4.7 cm.         Electronically signed by LAZARO MC      Vascular duplex lower extremity venous bilateral   Final Result    No evidence of bilateral lower extremity deep venous    thrombosis.         XR CHEST PORTABLE   Final Result   No acute cardiopulmonary findings.            Electronically signed by Benjy Shipley I personally reviewed laboratory testing, pulmonary imaging, radiology reports, and pulse oximetry data.    Please note that this dictation was completed with [employer], the [employer] voice recognition software.  Quite often unanticipated grammatical,

## 2025-05-18 NOTE — PLAN OF CARE
Predictive Model Details          28 (Normal)  Factor Value    Calculated 5/18/2025 08:57 40% Supplemental oxygen Nasal cannula    Deterioration Index Model 35% Age 59 years old     10% WBC count abnormal (12.2 K/uL)     4% Pulse oximetry 93 %     3% BUN abnormal (37 MG/DL)     3% Sodium 138 mmol/L     2% Respiratory rate 17     2% Potassium 3.6 mmol/L     1% Hematocrit 48.0 %     0% Temperature 97.5 °F (36.4 °C)     0% Systolic 110     0% Pulse 76        Problem: Discharge Planning  Goal: Discharge to home or other facility with appropriate resources  5/18/2025 0857 by Brandie Sutherland RN  Outcome: Progressing  5/17/2025 2307 by Jasmyne Franco RN  Outcome: Progressing     Problem: Skin/Tissue Integrity  Goal: Skin integrity remains intact  Description: 1.  Monitor for areas of redness and/or skin breakdown2.  Assess vascular access sites hourly3.  Every 4-6 hours minimum:  Change oxygen saturation probe site4.  Every 4-6 hours:  If on nasal continuous positive airway pressure, respiratory therapy assess nares and determine need for appliance change or resting period  Outcome: Progressing     Problem: Safety - Adult  Goal: Free from fall injury  5/18/2025 0857 by Brandie Sutherland RN  Outcome: Progressing  5/17/2025 2307 by Jasmyne Franco RN  Outcome: Progressing     Problem: Respiratory - Adult  Goal: Achieves optimal ventilation and oxygenation  5/18/2025 0857 by Brandie Sutherland RN  Outcome: Progressing  5/17/2025 2001 by Taisha Louis, RT  Outcome: Progressing     Problem: Cardiovascular - Adult  Goal: Maintains optimal cardiac output and hemodynamic stability  5/18/2025 0857 by Brandie Sutherland RN  Outcome: Progressing  5/17/2025 2307 by Jasmyne Franco RN  Outcome: Progressing  Goal: Absence of cardiac dysrhythmias or at baseline  5/18/2025 0857 by Brandie Sutherland RN  Outcome: Progressing  5/17/2025 2307 by Jasmyne Franco RN  Outcome: Progressing     Problem: Gastrointestinal - Adult  Goal:

## 2025-05-18 NOTE — PROGRESS NOTES
CSS FLOOR (Pre-op) Progress Note    Admit Date: 2025  POD: * No surgery found *      Procedure:        Subjective/overnight events:   Pt discussed with Dr. Soto, seen in bed. In NSR, on 3 liters via nasal cannula, afebrile. Vital signs stable. No events overnight. No complaints at present.      Objective:     /88   Pulse 76   Temp 97.5 °F (36.4 °C) (Oral)   Resp 17   Ht 1.829 m (6' 0.01\")   Wt 98.8 kg (217 lb 12.8 oz)   SpO2 93%   BMI 29.53 kg/m²   Temp (24hrs), Av.6 °F (36.4 °C), Min:97.4 °F (36.3 °C), Max:97.9 °F (36.6 °C)      Last 24hr Input/Output:    Intake/Output Summary (Last 24 hours) at 2025 1112  Last data filed at 2025 0500  Gross per 24 hour   Intake 622 ml   Output 1200 ml   Net -578 ml        Pre-op Workup    Carotid duplex,25 :     Mild (<50%) stenosis in the right internal carotid artery.  Mild and heterogeneous plaque in the right internal carotid artery.    Mild (<50%) stenosis in the left internal carotid artery.  Mild and heterogeneous plaque in the left internal carotid artery.    Normal antegrade flow involving the right vertebral artery.    Normal antegrade flow involving the left vertebral artery.    Cardiac cath: Pending.    Echocardiogram: 25    ECHO (TTE) LIMITED (PRN CONTRAST/BUBBLE/STRAIN/3D) 2025  2:42 PM (Final)    Interpretation Summary    Left Ventricle: Reduced left ventricular systolic function with a visually estimated EF of 35 - 40%. Left ventricle size is normal. Moderately increased wall thickness. Global hypokinesis present.    Right Ventricle: Right ventricle size is normal. Normal systolic function.    Aortic Valve: Not well visualized. Calcified cusps. Mild regurgitation. Severe stenosis of the aortic valve by provided gradient. AV mean gradient is 68 mmHg. AV area by continuity VTI is 0.7 cm2.    Left Atrium: Left atrium is dilated.    Right Atrium: Right atrium is dilated.    Signed by: Car Cooley MD on 2025   LVEF 35-40%, improving: No meds PTA. Diuresis per primary team/cards. Per Dr. Soto, will trend BNP. GDMT as tolerated. Strict I&O. Daily standing weights.   Elevated troponin: Needs cath after after respiratory status improves- Tentative [facility] Monday per Dr. Medley.   COPD exacerbation: patient of Dr. Rashawn Adams and their team is currently on board.  On Trelegy Ellipta PTA. On Solumedrol per primary team/pulmonary.  Scant hemoptysis: Thought 2/2 acute bronchitis. H&H stable as of this AM.   Mild bilateral carotid stenosis: Continue ASA. OP follow up with Vascular.   Leukocytosis: WBC 12.2, afebrile. In the setting of prednisone for COPD exacerbation. Continue OOB to chair, IS, ambulation.   Asymetric edema and concern for DVT: Venous duplex and CTA chest negative for DVT/PE.   Hyperglycemia without a history of DM: In the setting of systemic steroids for above.   Tobacco abuse: Advise cessation. Nicotine patch as needed.   Alcohol abuse: Advise cessation. Monitor for withdrawal and initiate CIWA protocol if needed.   Cocaine use: Advise cessation.      FEN/GI/DVT ppx: Per primary team for now.   Activity: OOB all meals and ambulate in halls TID; IS 10-15 x an hour while awake.  Dispo: PCU or IVCU pending surgical decision, please.     I spent a total of 20 minutes chart reviewing, interviewing patient, assessing patient, as well as devising and documenting a plan of care.     Signed By: BENJAMIN Gamboa - JACK

## 2025-05-18 NOTE — PROGRESS NOTES
Cardiology Progress Note  5/18/2025     Admit Date: 5/11/2025  Admit Diagnosis: Acute respiratory failure with hypoxemia (HCC) [J96.01]  Acute hypoxemic respiratory failure (HCC) [J96.01]  CC: none currently   Cardiac Assessment/Plan:    1) AS: severe by provided doppler on echo, but difficult to see AoV  2) Elevated trop (200s): LVH w/ repol changes on ecg; atypical CP/SHAY.  3) CM: EF 35-40% on echo; Nl TSH.  4) TAA: ascending Ao 5.1 cm 2/2024. 4.7 5/2025     5) COPD, Severe per Dr De La Cruz; ongoing tobacco use  6) EtOH excess; cocaine use per report     Admitted w/ resp failure as noted below; improved w/ diuretics/steroids.    Rec 5/12: Needs CTA of Ao; Will need cath to eval cors after resp status improves.         Echo 5/12/25:    Left Ventricle: Reduced left ventricular systolic function with a visually estimated EF of 35 - 40%. Left ventricle size is normal. Moderately increased wall thickness. Global hypokinesis present.    Right Ventricle: Right ventricle size is normal. Normal systolic function.    Aortic Valve: Not well visualized. Calcified cusps. Mild regurgitation. Severe stenosis of the aortic valve by provided gradient. AV mean gradient is 68 mmHg. AV area by continuity VTI is 0.7 cm2.    Left Atrium: Left atrium is dilated.    Right Atrium: Right atrium is dilated.    CTA 5/12/25:  No evidence of pulmonary embolism. Bilateral lower lobe mucous plugging, parabronchial soft tissue density, bilateral lower lobe consolidation. Dilatation of the ascending thoracic aorta, measuring 4.7 cm.    5/13: no CP; less dyspnea; Eating a fast-food sausage biscuit     BP 91-110s; HR 80-90s; Sinus; 91% 5L; excellent UOP  K 4.1; Cr 0.8; Alb 2.8; WBC 13; Hg 14.3.    5/15: No CP; less dyspnea overall but still on high dose O2.  -110s; HR ; Sinus; 89% 35L;   K 3.9; Cr 0.9;     5/16:  No CP; less dypnea overall; remains on high dose O2  -150; HR 70-80s; sinus; 89-91% 45L; excellent

## 2025-05-18 NOTE — PLAN OF CARE
End of Shift Note    Bedside shift change report given to KIKE Diego (oncoming nurse) by Jasmyne Franco RN (offgoing nurse).  Report included the following information SBAR and Pre Procedure Checklist    Shift worked:  6013-7424     Shift summary and any significant changes:     Pt's VSS throughout shift, pt remained on 3L NC throughout shift.      Concerns for physician to address:  None     Zone phone for oncoming shift:   None       Activity:  Level of Assistance: Independent  Number times ambulated in hallways past shift: 0  Number of times OOB to chair past shift: 1    Cardiac:   Cardiac Monitoring: Yes      Cardiac Rhythm: Sinus tachy, Sinus rhythm    Access:  Current line(s): PIV     Genitourinary:   Urinary Status: Voiding    Respiratory:   O2 Device: Nasal cannula  Chronic home O2 use?: NO  Incentive spirometer at bedside: YES    GI:  Last BM (including prior to admit): 05/17/25  Current diet:  ADULT DIET; Regular; 3 carb choices (45 gm/meal)  Diet NPO Exceptions are: Ice Chips, Sips of Water with Meds, Sips of Clear Liquids  Passing flatus: YES    Pain Management:   Patient states pain is manageable on current regimen: YES    Skin:  Po Scale Score: 22  Interventions: Wound Offloading (Prevention Methods): Repositioning    Patient Safety:  Fall Risk: Nursing Judgement-Fall Risk High(Add Comments): No  Fall Risk Interventions  Nursing Judgement-Fall Risk High(Add Comments): No  Toilet Every 2 Hours-In Advance of Need: Yes  Hourly Visual Checks: Awake, In bed  Fall Visual Posted: Socks  Room Door Open: Yes  Alarm On: Other (Comment) (refusal signed)  Patient Moved Closer to Nursing Station: No    Active Consults:   IP CONSULT TO SPIRITUAL SERVICES  IP CONSULT TO CARDIOLOGY  IP CONSULT TO PULMONOLOGY  IP CONSULT TO THORACIC SURGERY    Length of Stay:  Expected LOS: 8  Actual LOS: 6    Jasmyne Franco RN      Problem: Discharge Planning  Goal: Discharge to home or other facility with appropriate

## 2025-05-18 NOTE — PROGRESS NOTES
End of Shift Note    Bedside shift change report given to Jasmyne STOKES (oncoming nurse) by Brandie Sutherland RN (offgoing nurse).  Report included the following information SBAR    Shift worked:  0304-8233     Shift summary and any significant changes:     Plan for [facility] 5/19. Pt ambulated to bathroom. LBM 5/18. Pt successfully weaned off O2. Per Dr. Perico miranda for SpO2 to be as low as 89%.      Concerns for physician to address:  PCP for out patient care     [facility]   N[facility]A       Activity:  Level of Assistance: Independent  Number times ambulated in hallways past shift: 0  Number of times OOB to chair past shift: 3    Cardiac:   Cardiac Monitoring: Yes      Cardiac Rhythm: Sinus rhythm    Access:  Current line(s): PIV     Genitourinary:   Urinary Status: Voiding    Respiratory:   O2 Device: Nasal cannula  Chronic home O2 use?: NO  Incentive spirometer at bedside: YES    GI:  Last BM (including prior to admit): 05/18/25  Current diet:  ADULT DIET; Regular; 3 carb choices (45 gm/meal)  Diet NPO Exceptions are: Ice Chips, Sips of Water with Meds, Sips of Clear Liquids  Passing flatus: YES    Pain Management:   Patient states pain is manageable on current regimen: YES    Skin:  Po Scale Score: 22  Interventions: Wound Offloading (Prevention Methods): Repositioning, Pillows    Patient Safety:  Fall Risk: Nursing Judgement-Fall Risk High(Add Comments): No  Fall Risk Interventions  Nursing Judgement-Fall Risk High(Add Comments): No  Toilet Every 2 Hours-In Advance of Need: Yes  Hourly Visual Checks: Awake, In bed  Fall Visual Posted: Socks  Room Door Open: Deferred to decrease stimulation  Alarm On: Other (Comment) (Refusal signed)  Patient Moved Closer to Nursing Station: No    Active Consults:   IP CONSULT TO SPIRITUAL SERVICES  IP CONSULT TO CARDIOLOGY  IP CONSULT TO PULMONOLOGY  IP CONSULT TO THORACIC SURGERY    Length of Stay:  Expected LOS: 8  Actual LOS: 7    Brandie Sutherland

## 2025-05-19 LAB
ANION GAP SERPL CALC-SCNC: 5 MMOL/L (ref 2–12)
BACTERIA SPEC CULT: NORMAL
BACTERIA SPEC CULT: NORMAL
BASOPHILS # BLD: 0 K/UL (ref 0–0.1)
BASOPHILS NFR BLD: 0 % (ref 0–1)
BUN SERPL-MCNC: 34 MG/DL (ref 6–20)
BUN/CREAT SERPL: 41 (ref 12–20)
CALCIUM SERPL-MCNC: 8.4 MG/DL (ref 8.5–10.1)
CHLORIDE SERPL-SCNC: 103 MMOL/L (ref 97–108)
CO2 SERPL-SCNC: 32 MMOL/L (ref 21–32)
CREAT SERPL-MCNC: 0.82 MG/DL (ref 0.7–1.3)
DIFFERENTIAL METHOD BLD: NORMAL
ECHO BSA: 2.23 M2
EOSINOPHIL # BLD: 0 K/UL (ref 0–0.4)
EOSINOPHIL NFR BLD: 0 % (ref 0–7)
ERYTHROCYTE [DISTWIDTH] IN BLOOD BY AUTOMATED COUNT: 13.9 % (ref 11.5–14.5)
GLUCOSE BLD STRIP.AUTO-MCNC: 127 MG/DL (ref 65–117)
GLUCOSE BLD STRIP.AUTO-MCNC: 148 MG/DL (ref 65–117)
GLUCOSE BLD STRIP.AUTO-MCNC: 203 MG/DL (ref 65–117)
GLUCOSE BLD STRIP.AUTO-MCNC: 90 MG/DL (ref 65–117)
GLUCOSE BLD STRIP.AUTO-MCNC: 95 MG/DL (ref 65–117)
GLUCOSE SERPL-MCNC: 89 MG/DL (ref 65–100)
HCT VFR BLD AUTO: 46.9 % (ref 36.6–50.3)
HGB BLD-MCNC: 15.1 G/DL (ref 12.1–17)
IMM GRANULOCYTES # BLD AUTO: 0 K/UL (ref 0–0.04)
IMM GRANULOCYTES NFR BLD AUTO: 0 % (ref 0–0.5)
LYMPHOCYTES # BLD: 2.13 K/UL (ref 0.8–3.5)
LYMPHOCYTES NFR BLD: 26 % (ref 12–49)
MAGNESIUM SERPL-MCNC: 2.2 MG/DL (ref 1.6–2.4)
MCH RBC QN AUTO: 30.8 PG (ref 26–34)
MCHC RBC AUTO-ENTMCNC: 32.2 G/DL (ref 30–36.5)
MCV RBC AUTO: 95.5 FL (ref 80–99)
MONOCYTES # BLD: 0.41 K/UL (ref 0–1)
MONOCYTES NFR BLD: 5 % (ref 5–13)
NEUTS SEG # BLD: 5.66 K/UL (ref 1.8–8)
NEUTS SEG NFR BLD: 69 % (ref 32–75)
NRBC # BLD: 0 K/UL (ref 0–0.01)
NRBC BLD-RTO: 0 PER 100 WBC
NT PRO BNP: 3756 PG/ML
PLATELET # BLD AUTO: 210 K/UL (ref 150–400)
PMV BLD AUTO: 9.5 FL (ref 8.9–12.9)
POTASSIUM SERPL-SCNC: 4 MMOL/L (ref 3.5–5.1)
RBC # BLD AUTO: 4.91 M/UL (ref 4.1–5.7)
RBC MORPH BLD: NORMAL
SERVICE CMNT-IMP: ABNORMAL
SERVICE CMNT-IMP: NORMAL
SODIUM SERPL-SCNC: 140 MMOL/L (ref 136–145)
WBC # BLD AUTO: 8.2 K/UL (ref 4.1–11.1)

## 2025-05-19 PROCEDURE — 82962 GLUCOSE BLOOD TEST: CPT

## 2025-05-19 PROCEDURE — 36415 COLL VENOUS BLD VENIPUNCTURE: CPT

## 2025-05-19 PROCEDURE — 83880 ASSAY OF NATRIURETIC PEPTIDE: CPT

## 2025-05-19 PROCEDURE — 99153 MOD SED SAME PHYS/QHP EA: CPT | Performed by: INTERNAL MEDICINE

## 2025-05-19 PROCEDURE — C1769 GUIDE WIRE: HCPCS | Performed by: INTERNAL MEDICINE

## 2025-05-19 PROCEDURE — 6360000004 HC RX CONTRAST MEDICATION: Performed by: INTERNAL MEDICINE

## 2025-05-19 PROCEDURE — 76937 US GUIDE VASCULAR ACCESS: CPT | Performed by: INTERNAL MEDICINE

## 2025-05-19 PROCEDURE — 6370000000 HC RX 637 (ALT 250 FOR IP): Performed by: STUDENT IN AN ORGANIZED HEALTH CARE EDUCATION/TRAINING PROGRAM

## 2025-05-19 PROCEDURE — 2060000000 HC ICU INTERMEDIATE R&B

## 2025-05-19 PROCEDURE — 93458 L HRT ARTERY/VENTRICLE ANGIO: CPT | Performed by: INTERNAL MEDICINE

## 2025-05-19 PROCEDURE — 6360000002 HC RX W HCPCS: Performed by: INTERNAL MEDICINE

## 2025-05-19 PROCEDURE — 2700000000 HC OXYGEN THERAPY PER DAY

## 2025-05-19 PROCEDURE — C1751 CATH, INF, PER/CENT/MIDLINE: HCPCS | Performed by: INTERNAL MEDICINE

## 2025-05-19 PROCEDURE — 99152 MOD SED SAME PHYS/QHP 5/>YRS: CPT | Performed by: INTERNAL MEDICINE

## 2025-05-19 PROCEDURE — 4A023N7 MEASUREMENT OF CARDIAC SAMPLING AND PRESSURE, LEFT HEART, PERCUTANEOUS APPROACH: ICD-10-PCS | Performed by: INTERNAL MEDICINE

## 2025-05-19 PROCEDURE — B2111ZZ FLUOROSCOPY OF MULTIPLE CORONARY ARTERIES USING LOW OSMOLAR CONTRAST: ICD-10-PCS | Performed by: INTERNAL MEDICINE

## 2025-05-19 PROCEDURE — 6370000000 HC RX 637 (ALT 250 FOR IP): Performed by: INTERNAL MEDICINE

## 2025-05-19 PROCEDURE — 2709999900 HC NON-CHARGEABLE SUPPLY: Performed by: INTERNAL MEDICINE

## 2025-05-19 PROCEDURE — 80048 BASIC METABOLIC PNL TOTAL CA: CPT

## 2025-05-19 PROCEDURE — 83735 ASSAY OF MAGNESIUM: CPT

## 2025-05-19 PROCEDURE — 94640 AIRWAY INHALATION TREATMENT: CPT

## 2025-05-19 PROCEDURE — 2500000003 HC RX 250 WO HCPCS: Performed by: INTERNAL MEDICINE

## 2025-05-19 PROCEDURE — 85025 COMPLETE CBC W/AUTO DIFF WBC: CPT

## 2025-05-19 PROCEDURE — C1894 INTRO/SHEATH, NON-LASER: HCPCS | Performed by: INTERNAL MEDICINE

## 2025-05-19 PROCEDURE — 2580000003 HC RX 258: Performed by: INTERNAL MEDICINE

## 2025-05-19 RX ORDER — HEPARIN SODIUM 1000 [USP'U]/ML
INJECTION, SOLUTION INTRAVENOUS; SUBCUTANEOUS PRN
Status: DISCONTINUED | OUTPATIENT
Start: 2025-05-19 | End: 2025-05-19 | Stop reason: HOSPADM

## 2025-05-19 RX ORDER — FENTANYL CITRATE 50 UG/ML
INJECTION, SOLUTION INTRAMUSCULAR; INTRAVENOUS PRN
Status: DISCONTINUED | OUTPATIENT
Start: 2025-05-19 | End: 2025-05-19 | Stop reason: HOSPADM

## 2025-05-19 RX ORDER — SODIUM CHLORIDE 0.9 % (FLUSH) 0.9 %
5-40 SYRINGE (ML) INJECTION EVERY 12 HOURS SCHEDULED
Status: DISCONTINUED | OUTPATIENT
Start: 2025-05-19 | End: 2025-05-20 | Stop reason: HOSPADM

## 2025-05-19 RX ORDER — LIDOCAINE HYDROCHLORIDE 10 MG/ML
INJECTION, SOLUTION INFILTRATION; PERINEURAL PRN
Status: DISCONTINUED | OUTPATIENT
Start: 2025-05-19 | End: 2025-05-19 | Stop reason: HOSPADM

## 2025-05-19 RX ORDER — SODIUM CHLORIDE 0.9 % (FLUSH) 0.9 %
5-40 SYRINGE (ML) INJECTION PRN
Status: DISCONTINUED | OUTPATIENT
Start: 2025-05-19 | End: 2025-05-20 | Stop reason: HOSPADM

## 2025-05-19 RX ORDER — HEPARIN SODIUM 10000 [USP'U]/ML
INJECTION, SOLUTION INTRAVENOUS; SUBCUTANEOUS PRN
Status: DISCONTINUED | OUTPATIENT
Start: 2025-05-19 | End: 2025-05-19 | Stop reason: HOSPADM

## 2025-05-19 RX ORDER — VERAPAMIL HYDROCHLORIDE 2.5 MG/ML
INJECTION INTRAVENOUS PRN
Status: DISCONTINUED | OUTPATIENT
Start: 2025-05-19 | End: 2025-05-19 | Stop reason: HOSPADM

## 2025-05-19 RX ORDER — SODIUM CHLORIDE 9 MG/ML
INJECTION, SOLUTION INTRAVENOUS PRN
Status: DISCONTINUED | OUTPATIENT
Start: 2025-05-19 | End: 2025-05-20 | Stop reason: HOSPADM

## 2025-05-19 RX ORDER — 0.9 % SODIUM CHLORIDE 0.9 %
INTRAVENOUS SOLUTION INTRAVENOUS CONTINUOUS PRN
Status: DISCONTINUED | OUTPATIENT
Start: 2025-05-19 | End: 2025-05-19 | Stop reason: HOSPADM

## 2025-05-19 RX ORDER — IOPAMIDOL 755 MG/ML
INJECTION, SOLUTION INTRAVASCULAR PRN
Status: DISCONTINUED | OUTPATIENT
Start: 2025-05-19 | End: 2025-05-19 | Stop reason: HOSPADM

## 2025-05-19 RX ORDER — ACETAMINOPHEN 325 MG/1
650 TABLET ORAL EVERY 4 HOURS PRN
Status: DISCONTINUED | OUTPATIENT
Start: 2025-05-19 | End: 2025-05-20 | Stop reason: HOSPADM

## 2025-05-19 RX ADMIN — SODIUM CHLORIDE, PRESERVATIVE FREE 10 ML: 5 INJECTION INTRAVENOUS at 08:48

## 2025-05-19 RX ADMIN — PANTOPRAZOLE SODIUM 40 MG: 40 TABLET, DELAYED RELEASE ORAL at 06:35

## 2025-05-19 RX ADMIN — ARFORMOTEROL TARTRATE: 15 SOLUTION RESPIRATORY (INHALATION) at 19:38

## 2025-05-19 RX ADMIN — INSULIN LISPRO 2 UNITS: 100 INJECTION, SOLUTION INTRAVENOUS; SUBCUTANEOUS at 17:24

## 2025-05-19 RX ADMIN — PREDNISONE 20 MG: 20 TABLET ORAL at 08:47

## 2025-05-19 RX ADMIN — ASPIRIN 81 MG: 81 TABLET, CHEWABLE ORAL at 08:47

## 2025-05-19 RX ADMIN — IPRATROPIUM BROMIDE 0.5 MG: 0.5 SOLUTION RESPIRATORY (INHALATION) at 19:33

## 2025-05-19 RX ADMIN — ARFORMOTEROL TARTRATE: 15 SOLUTION RESPIRATORY (INHALATION) at 08:50

## 2025-05-19 RX ADMIN — IPRATROPIUM BROMIDE 0.5 MG: 0.5 SOLUTION RESPIRATORY (INHALATION) at 08:45

## 2025-05-19 ASSESSMENT — PAIN SCALES - GENERAL: PAINLEVEL_OUTOF10: 0

## 2025-05-19 ASSESSMENT — EJECTION FRACTION: EF_VALUE: .34

## 2025-05-19 NOTE — PROGRESS NOTES
Cardiology Progress Note  5/19/2025     Admit Date: 5/11/2025  Admit Diagnosis: Acute respiratory failure with hypoxemia (HCC) [J96.01]  Acute hypoxemic respiratory failure (HCC) [J96.01]  CC: none currently   Cardiac Assessment/Plan:    1) AS: severe by provided doppler on echo, but difficult to see AoV  2) Elevated trop (200s): LVH w/ repol changes on ecg; atypical CP/SHAY.  3) CM: EF 35-40% on echo; Nl TSH.  4) TAA: ascending Ao 5.1 cm 2/2024. 4.7 5/2025     5) COPD, Severe per Dr De La Cruz; ongoing tobacco use  6) EtOH excess; cocaine use per report     Admitted w/ resp failure as noted below; improved w/ diuretics/steroids.    Rec 5/12: Needs CTA of Ao; Will need cath to eval cors after resp status improves.         Echo 5/12/25:    Left Ventricle: Reduced left ventricular systolic function with a visually estimated EF of 35 - 40%. Left ventricle size is normal. Moderately increased wall thickness. Global hypokinesis present.    Right Ventricle: Right ventricle size is normal. Normal systolic function.    Aortic Valve: Not well visualized. Calcified cusps. Mild regurgitation. Severe stenosis of the aortic valve by provided gradient. AV mean gradient is 68 mmHg. AV area by continuity VTI is 0.7 cm2.    Left Atrium: Left atrium is dilated.    Right Atrium: Right atrium is dilated.    CTA 5/12/25:  No evidence of pulmonary embolism. Bilateral lower lobe mucous plugging, parabronchial soft tissue density, bilateral lower lobe consolidation. Dilatation of the ascending thoracic aorta, measuring 4.7 cm.    TAVR CTA shows likely bicuspid valve with ascending aortic aneurysm.    5/13: no CP; less dyspnea; Eating a fast-food sausage biscuit     BP 91-110s; HR 80-90s; Sinus; 91% 5L; excellent UOP  K 4.1; Cr 0.8; Alb 2.8; WBC 13; Hg 14.3.    5/16:  No CP; less dypnea overall; remains on high dose O2  -150; HR 70-80s; sinus; 89-91% 45L; excellent UOP  K 4.3; Cr 0.8    5/19: No CP; less dyspnea; much  less O2: down to 2L  BP ; HR 70-90s; sinus; 94% 2L  K 4; BUN 34; Cr 0.8; proBNP 3.7k; Hg 15.1.    Cardiac meds: asa; Lovenox 30 bid; lasix 40 IV bid; aspirin 81    I have recommended diagnostic cath for definitive evaluation of the patient's coronary anatomy and PCI if appropriate;   All risks, benefits, and alternatives were discussed and patient wishes to proceed.     High complexity decision making was performed  X Yes   High-risk of decompensation with multiple organ involvement X Yes       ____________________________________________________________________  Jose Díaz III is a 59 y.o. male presents with Acute respiratory failure with hypoxemia (HCC) [J96.01]  Acute hypoxemic respiratory failure (HCC) [J96.01].     As noted in H&P: \"59 M smoker (1 PPD x approx 40 yrs) with history of COPD on Trelegy inhaler who at baseline is highly functional, employed as an . He admits to approx 2 beers per day and occasional recreational substance use. Admitted in transfer from [facility] with acute hypoxemic respiratory failure. Symptoms began approx 2 weeks ago with exertional dyspnea, cough, yellow mucus, scant hemoptysis. He was treated by his primary care provider as COPD exacerbation with prednisone and antibiotics and noted some improvement but not resolution. In the past 2-3 days he has had increasing dyspnea which progressed to rest dyspnea with orthopnea noted on the night prior to admission. In the [facility] ED, he was hypoxic and tachypneic. His CXR revealed CM with mild interstitial prominence. His EKG revealed changes consistent with LVH and T wave inversions in V5, V6. Troponin was modestly elevated in mid 200s, BNP > 10,000. He received furosemide and bronchodilators with transient improvement, then deterioration requiring NIPPV. Therefore, [facility] CCM was consulted and accepted the patient in transfer. Upon arrival to the ICU, he was placed on NC O2. Although tachypneic (RR aprox 26/min) he was able to

## 2025-05-19 NOTE — CARE COORDINATION
Transition of Care Plan:    RUR: 8% \"low risk\"  Prior Level of Functioning: Independent in ADL's  Disposition: Home with spouse  CHESTER: 5/20  If SNF or IPR: Date FOC offered: N/A  Follow up appointments: PCP/Specialist as indicated  DME needed: None  Transportation at discharge: Family to transport   IM/IMM Medicare/ letter given: N/A  Is patient a  and connected with VA? No  Caregiver Contact: Jacqueline Díaz (spouse), [phone]  Discharge Caregiver contacted prior to discharge? To be contacted   Care Conference needed? Not at this time  Barriers to discharge: Cardiology     1602 PM: Chart reviewed. CM following for d/c planning. Pt to return home with spouse upon medical clearance.     MARCUS Leblanc  Care Management  [facility]   [phone]

## 2025-05-19 NOTE — PROGRESS NOTES
Patient arrived to IVCU from Cath Lab. Right radial, TR band at 12cc, CDI, no bleeding/hematoma. Immobilizer in place. VSS, NSR, RA, Afebrile. Patient on bedrest.

## 2025-05-19 NOTE — PLAN OF CARE
End of Shift Note    Bedside shift change report given to KIKE Pacheco (oncoming nurse) by Jasmyne Franco RN (offgoing nurse).  Report included the following information SBAR and Pre Procedure Checklist    Shift worked:  2940-2072     Shift summary and any significant changes:    1900 Pt sitting on edge of bed on RA, O2 sats 90-91 %.    2330 Pt laying flat in bed, O2 sats 86% on RA, pt placed on 2L NC, O2 sats 93-95 %    Pt prepped for cardiac cath 5-19-25   Concerns for physician to address:  None     Zone phone for oncoming shift:   None       Activity:  Level of Assistance: Independent  Number times ambulated in hallways past shift: 1  Number of times OOB to chair past shift: 1    Cardiac:   Cardiac Monitoring: Yes      Cardiac Rhythm: Sinus rhythm, Sinus tachy    Access:  Current line(s): PIV     Genitourinary:   Urinary Status: Voiding    Respiratory:   O2 Device: Nasal cannula  Chronic home O2 use?: NO  Incentive spirometer at bedside: YES    GI:  Last BM (including prior to admit): 05/18/25  Current diet:  Diet NPO Exceptions are: Ice Chips, Sips of Water with Meds, Sips of Clear Liquids  Passing flatus: YES    Pain Management:   Patient states pain is manageable on current regimen: YES    Skin:  Po Scale Score: 22  Interventions: Wound Offloading (Prevention Methods): Repositioning, Elevate heels    Patient Safety:  Fall Risk: Nursing Judgement-Fall Risk High(Add Comments): No  Fall Risk Interventions  Nursing Judgement-Fall Risk High(Add Comments): No  Toilet Every 2 Hours-In Advance of Need: Yes  Hourly Visual Checks: In bed, Quiet  Fall Visual Posted: Socks  Room Door Open: Deferred to promote rest  Alarm On: Other (Comment) (refusal signed)  Patient Moved Closer to Nursing Station: No    Active Consults:   IP CONSULT TO SPIRITUAL SERVICES  IP CONSULT TO CARDIOLOGY  IP CONSULT TO PULMONOLOGY  IP CONSULT TO THORACIC SURGERY    Length of Stay:  Expected LOS: 8  Actual LOS: 8    Jasmyne Franco  RN      Problem: Discharge Planning  Goal: Discharge to home or other facility with appropriate resources  Outcome: Progressing     Problem: Cardiovascular - Adult  Goal: Maintains optimal cardiac output and hemodynamic stability  Outcome: Progressing     Problem: Cardiovascular - Adult  Goal: Absence of cardiac dysrhythmias or at baseline  Outcome: Progressing     Predictive Model Details          31  Factor Value    Calculated 5/19/2025 00:55 36% Supplemental oxygen Nasal cannula    Deterioration Index Model 32% Age 59 years old     9% WBC count abnormal (12.2 K/uL)     8% Respiratory rate 18     7% Systolic 102     3% BUN abnormal (37 MG/DL)     2% Sodium 138 mmol/L     2% Potassium 3.6 mmol/L     1% Pulse 86     1% Hematocrit 48.0 %     0% Temperature 97.9 °F (36.6 °C)     0% Pulse oximetry 95 %

## 2025-05-19 NOTE — PROGRESS NOTES
Hospitalist Progress Note    NAME:   Jose Díaz III   : 1965   MRN: [id number]     Date/Time: 2025 11:22 AM  Patient PCP: No primary care provider on file.    Estimated discharge date:   Barriers: Improvement of hypoxia    ICU COURSE :   59 M smoker (1 PPD x approx 40 yrs) with history of COPD on Trelegy inhaler who at baseline is highly functional, employed as an . He admits to approx 2 beers per day and occasional recreational substance use. Admitted in transfer from [facility] with acute hypoxemic respiratory failure. Symptoms began approx 2 weeks ago with exertional dyspnea, cough, yellow mucus, scant hemoptysis. He was treated by his primary care provider as COPD exacerbation with prednisone and antibiotics and noted some improvement but not resolution. In the past 2-3 days he has had increasing dyspnea which progressed to rest dyspnea with orthopnea noted on the night prior to admission. In the [facility] ED, he was hypoxic and tachypneic. His CXR revealed CM with mild interstitial prominence. His EKG revealed changes consistent with LVH and T wave inversions in V5, V6. Troponin was modestly elevated in mid 200s, BNP > 10,000. He received furosemide and bronchodilators with transient improvement, then deterioration requiring NIPPV. Therefore, [facility] CCM was consulted and accepted the patient in transfer. Upon arrival to the ICU, he was placed on NC O2. Although tachypneic (RR aprox 26/min) he was able to speak in full sentences with SpO2 94% 0n 6 LPM NC. However, he desaturated significantly when sitting to side of the bed to urinate. Therefore, NIPPV was resumed. His initial exam revealed distant breath sounds with few scattered wheezes and asymmetric R>L ankle edema. Admitting diagnosis was acute hypoxic resp failure of unclear etiology and likely multifactorial - CHF, COPD exac and rule out VTE       Assessment / Plan:    Acute hypoxic respiratory failure POA required 50 L, 60% oxygen by  hyperglycemia while on steroids  Discussed case with: NS/patient      Total Time 33 minutes    Subjective:     Chief Complaint / Reason for Physician Visit  \" My breathing feels a whole lot better than when I came in\"  Weaned to room air, sats ~ 90%  Awaiting cardiac cath when I saw  Denies SOB or CP  No fever  Overnight events noted    Objective:     VITALS:   Last 24hrs VS reviewed since prior progress note. Most recent are:  Patient Vitals for the past 24 hrs:   BP Temp Temp src Pulse Resp SpO2 Weight   05/19/25 0730 114/85 98.2 °F (36.8 °C) Oral 73 16 91 % --   05/19/25 0425 110/81 98.1 °F (36.7 °C) Oral 76 18 94 % 98.4 kg (217 lb)   05/19/25 0111 -- -- -- 71 -- 95 % --   05/19/25 0030 -- -- -- 86 -- 95 % --   05/18/25 2326 -- -- -- 89 -- 93 % --   05/18/25 2324 -- -- -- (!) 102 -- (!) 87 % --   05/18/25 2323 102/77 97.9 °F (36.6 °C) Oral 91 18 90 % --   05/18/25 2300 -- -- -- 84 -- (!) 86 % --   05/18/25 2215 -- -- -- 81 -- (!) 86 % --   05/18/25 2131 -- -- -- 79 16 90 % --   05/18/25 2130 -- -- -- -- 16 -- --   05/18/25 2121 -- -- -- 88 17 91 % --   05/18/25 1934 92/75 98 °F (36.7 °C) Oral 92 18 90 % --   05/18/25 1530 110/78 97.4 °F (36.3 °C) Axillary 85 18 92 % --   05/18/25 1213 100/78 97.6 °F (36.4 °C) Oral 87 20 93 % --         Intake/Output Summary (Last 24 hours) at 5/19/2025 1122  Last data filed at 5/19/2025 0630  Gross per 24 hour   Intake 1480 ml   Output 3300 ml   Net -1820 ml        I had a face to face encounter and independently examined this patient on 5/19/2025, as outlined below:  PHYSICAL EXAM:  General: Alert, cooperative  EENT:  Anicteric sclerae.  Resp:  Moving air bilaterally, no wheezing, few crackles at bases    No accessory muscle use or retractions  CV:  Regular  rhythm, + SM, trace rt le edema   GI:  Soft, Non distended, Non tender.  +Bowel sounds  Neurologic:  Alert and awake, normal speech  Psych:   Pleasant  Skin:  No rashes.  No jaundice    Reviewed most current lab test results

## 2025-05-19 NOTE — PROGRESS NOTES
End of Shift Note    Bedside shift change report given to Susy (oncoming nurse) by Tara Flores RN (offgoing nurse).  Report included the following information Nurse Handoff Report and Index    Shift worked:  7a-7p     Shift summary and any significant changes:     Cath today no intervention, Still waiting for surgical plan      Concerns for physician to address:  Surgical plan      Zone phone for oncoming shift:   N/A        Activity:  Activity: In bed  Number times ambulated in hallways past shift: 1  Number of times OOB to chair past shift: 1    Cardiac:   Cardiac Monitoring: Yes      Cardiac Rhythm: Sinus rhythm    Access:  Current line(s): PIV     Genitourinary:   Urinary status: voiding    Respiratory:   O2 Device: None (Room air)  Chronic home O2 use?: NO  Incentive spirometer at bedside: YES  Maximum Achieved Volume (mL): 2000 mL    GI:  Last BM (including prior to admit): 05/18/25  Current diet:    ADULT DIET; Regular; Low Fat/Low Chol/High Fiber/2 gm Na  Passing flatus: YES  Tolerating current diet: YES       Pain Management:   Patient states pain is manageable on current regimen: YES    Skin:  Po Scale Score: 22  Interventions: float heels    Patient Safety:  Fall Score: Salvador Total Score: 35  Interventions: bed/chair alarm, gripper socks, and pt to call before getting OOB       Length of Stay:  Expected LOS: 9  Actual LOS: 8      Tara Flores RN

## 2025-05-19 NOTE — PROGRESS NOTES
5/19/2025 2:03 PM  Patient without complaints.  Last VS: /85   Pulse 70   Temp 97.8 °F (36.6 °C) (Oral)   Resp 18   Ht 1.829 m (6' 0.01\")   Wt 98.4 kg (217 lb)   SpO2 92%   BMI 29.42 kg/m²   Cath site without hematoma, bleeding or new bruit.  Distal pulses at baseline.    Continue current plan of care.  Results of cath discussed with patient and his wife.

## 2025-05-19 NOTE — PROGRESS NOTES
K 4.1 3.6 4.0    102 103   CO2 27 31 32   BUN 28* 37* 34*   MG  --   --  2.2        Pertinent Labs @LABRCNT(CPK:3,CKMB:3,TROPONINI:3,B-NP:3))@  Microbiology:  No results found for: \"SDES\"  No components found for: \"CULT\"     Xray Result (most recent): image(s) personally reviewed  CTA TRANSCATHETER AORTIC VALVE REPLACEMENT  Narrative: INDICATION: severe AS Localized edema / Reason for exam:->severe AS    EXAMINATION:  CT ANGIOGRAPHY CHEST ABDOMEN AND PELVIS    COMPARISON: 5/12/2025     TECHNIQUE:  CT Angiography of the chest abdomen and pelvis was performed  following the uneventful administration of 100 mL of Isovue-370 contrast.  3D  image postprocessing was performed.  CT dose reduction was achieved through the  use of a standardized protocol tailored for this examination and automatic  exposure control for dose modulation. Maximum intensity projections were  reconstructed.     FINDINGS:    THYROID: Unremarkable.  MEDIASTINUM/NATALI: No mass or lymphadenopathy.  HEART/PERICARDIUM: Unremarkable.  AORTA:  No aneurysm.  LUNGS: Mild diffuse centrilobular nodules in the bilateral lower lobes and right  middle lobe as well as lingula.. Improvement in the previously noted airspace  opacities. Bronchial wall thickening is noted in the bilateral lung bases.  Liver: No mass or biliary dilatation.  Gallbladder: Unremarkable.  Spleen: No mass.  Pancreas: No mass or ductal dilatation.  Adrenals: Unremarkable.  Kidneys: No mass, calculus, or hydronephrosis.  Stomach: Unremarkable.  Small bowel: No dilatation or wall thickening.  Colon: No dilatation or wall thickening.  Appendix: Unremarkable.  Peritoneum: No ascites or pneumoperitoneum.  Retroperitoneum: No lymphadenopathy or aortic aneurysm.  Reproductive organs: Unremarkable  Urinary bladder: No mass or calculus.  Bones: No destructive bone lesion.  Additional comments: N/A  Impression: 1. CT for preprocedural planning.    2. Bilateral lower lobe, right middle lobe,

## 2025-05-19 NOTE — PROGRESS NOTES
Attempted to schedule PCP hospital follow up. Patient doesn't have a PCP documented on the chart at this time. [facility] cardio office will call the patient. Cardio hospital follow up must be scheduled by the MD, PA, NP's RN with the patient per office protocol. Pending patient discharge.

## 2025-05-20 ENCOUNTER — PREP FOR PROCEDURE (OUTPATIENT)
Age: 60
End: 2025-05-20

## 2025-05-20 VITALS
DIASTOLIC BLOOD PRESSURE: 86 MMHG | HEART RATE: 75 BPM | OXYGEN SATURATION: 92 % | RESPIRATION RATE: 18 BRPM | HEIGHT: 72 IN | WEIGHT: 219.8 LBS | SYSTOLIC BLOOD PRESSURE: 109 MMHG | BODY MASS INDEX: 29.77 KG/M2 | TEMPERATURE: 97.8 F

## 2025-05-20 DIAGNOSIS — I35.0 NONRHEUMATIC AORTIC VALVE STENOSIS: Primary | ICD-10-CM

## 2025-05-20 PROBLEM — I35.1 AORTIC INSUFFICIENCY: Status: ACTIVE | Noted: 2025-05-20

## 2025-05-20 LAB
ANION GAP SERPL CALC-SCNC: 2 MMOL/L (ref 2–12)
BUN SERPL-MCNC: 26 MG/DL (ref 6–20)
BUN/CREAT SERPL: 30 (ref 12–20)
CALCIUM SERPL-MCNC: 8.2 MG/DL (ref 8.5–10.1)
CHLORIDE SERPL-SCNC: 107 MMOL/L (ref 97–108)
CO2 SERPL-SCNC: 31 MMOL/L (ref 21–32)
CREAT SERPL-MCNC: 0.86 MG/DL (ref 0.7–1.3)
GLUCOSE BLD STRIP.AUTO-MCNC: 99 MG/DL (ref 65–117)
GLUCOSE SERPL-MCNC: 131 MG/DL (ref 65–100)
POTASSIUM SERPL-SCNC: 3.7 MMOL/L (ref 3.5–5.1)
SERVICE CMNT-IMP: NORMAL
SODIUM SERPL-SCNC: 140 MMOL/L (ref 136–145)

## 2025-05-20 PROCEDURE — 6360000002 HC RX W HCPCS: Performed by: INTERNAL MEDICINE

## 2025-05-20 PROCEDURE — 36415 COLL VENOUS BLD VENIPUNCTURE: CPT

## 2025-05-20 PROCEDURE — 82962 GLUCOSE BLOOD TEST: CPT

## 2025-05-20 PROCEDURE — 6370000000 HC RX 637 (ALT 250 FOR IP): Performed by: INTERNAL MEDICINE

## 2025-05-20 PROCEDURE — 80048 BASIC METABOLIC PNL TOTAL CA: CPT

## 2025-05-20 PROCEDURE — 6370000000 HC RX 637 (ALT 250 FOR IP): Performed by: STUDENT IN AN ORGANIZED HEALTH CARE EDUCATION/TRAINING PROGRAM

## 2025-05-20 PROCEDURE — 94640 AIRWAY INHALATION TREATMENT: CPT

## 2025-05-20 PROCEDURE — 2500000003 HC RX 250 WO HCPCS: Performed by: INTERNAL MEDICINE

## 2025-05-20 RX ORDER — ENOXAPARIN SODIUM 100 MG/ML
40 INJECTION SUBCUTANEOUS DAILY
Status: DISCONTINUED | OUTPATIENT
Start: 2025-05-20 | End: 2025-05-20 | Stop reason: HOSPADM

## 2025-05-20 RX ORDER — SPIRONOLACTONE 25 MG/1
12.5 TABLET ORAL DAILY
Qty: 60 TABLET | Refills: 2 | Status: SHIPPED | OUTPATIENT
Start: 2025-05-21

## 2025-05-20 RX ORDER — FLUTICASONE FUROATE, UMECLIDINIUM BROMIDE AND VILANTEROL TRIFENATATE 100; 62.5; 25 UG/1; UG/1; UG/1
1 POWDER RESPIRATORY (INHALATION) DAILY
Qty: 1 EACH | Refills: 3 | Status: SHIPPED | OUTPATIENT
Start: 2025-05-20

## 2025-05-20 RX ORDER — SPIRONOLACTONE 25 MG/1
12.5 TABLET ORAL DAILY
Status: DISCONTINUED | OUTPATIENT
Start: 2025-05-20 | End: 2025-05-20 | Stop reason: HOSPADM

## 2025-05-20 RX ORDER — ALBUTEROL SULFATE 90 UG/1
2 INHALANT RESPIRATORY (INHALATION) EVERY 4 HOURS PRN
Qty: 18 G | Refills: 3 | Status: SHIPPED | OUTPATIENT
Start: 2025-05-20

## 2025-05-20 RX ORDER — ASPIRIN 81 MG/1
81 TABLET, CHEWABLE ORAL DAILY
Qty: 30 TABLET | Refills: 3 | Status: SHIPPED | OUTPATIENT
Start: 2025-05-21

## 2025-05-20 RX ORDER — PANTOPRAZOLE SODIUM 40 MG/1
40 TABLET, DELAYED RELEASE ORAL
Qty: 30 TABLET | Refills: 3 | Status: SHIPPED | OUTPATIENT
Start: 2025-05-21

## 2025-05-20 RX ADMIN — ASPIRIN 81 MG: 81 TABLET, CHEWABLE ORAL at 09:00

## 2025-05-20 RX ADMIN — SPIRONOLACTONE 12.5 MG: 25 TABLET ORAL at 09:00

## 2025-05-20 RX ADMIN — IPRATROPIUM BROMIDE 0.5 MG: 0.5 SOLUTION RESPIRATORY (INHALATION) at 09:15

## 2025-05-20 RX ADMIN — PANTOPRAZOLE SODIUM 40 MG: 40 TABLET, DELAYED RELEASE ORAL at 06:35

## 2025-05-20 RX ADMIN — SODIUM CHLORIDE, PRESERVATIVE FREE 10 ML: 5 INJECTION INTRAVENOUS at 08:59

## 2025-05-20 RX ADMIN — ARFORMOTEROL TARTRATE: 15 SOLUTION RESPIRATORY (INHALATION) at 09:15

## 2025-05-20 RX ADMIN — ENOXAPARIN SODIUM 40 MG: 100 INJECTION SUBCUTANEOUS at 09:00

## 2025-05-20 ASSESSMENT — PAIN SCALES - GENERAL: PAINLEVEL_OUTOF10: 0

## 2025-05-20 NOTE — PLAN OF CARE
Predictive Model Details          20 (Normal)  Factor Value    Calculated 5/19/2025 21:23 57% Age 59 years old    Deterioration Index Model 20% Systolic 99     10% Pulse oximetry 92 %     5% BUN abnormal (34 MG/DL)     4% Pulse 91     2% WBC count 8.2 K/uL     1% Hematocrit 46.9 %     1% Sodium 140 mmol/L     0% Potassium 4.0 mmol/L     0% Temperature 98.6 °F (37 °C)     0% Respiratory rate 16      End of Shift Note    Bedside shift change report given to ,RN (oncoming nurse) by Christine Vargas RN (offgoing nurse).  Report included the following information SBAR and Cardiac Rhythm ***    Shift worked:  7p to 7am     Shift summary and any significant changes:     ***     Concerns for physician to address:  ***     Zone phone for oncoming shift:   ***       Activity:  Level of Assistance: Independent  Number times ambulated in hallways past shift: {Numbers; 0-5:396441}  Number of times OOB to chair past shift: {Numbers; 0-5:651270}    Cardiac:   Cardiac Monitoring: {YES/NO:59664}      Cardiac Rhythm: Sinus rhythm    Access:  Current line(s): {Line choices:94970}    Genitourinary:   Urinary Status: Voiding    Respiratory:   O2 Device: None (Room air)  Chronic home O2 use?: {YES/NO/NA:75355}  Incentive spirometer at bedside: {YES/NO/NA:56859}    GI:  Last BM (including prior to admit): 05/19/25  Current diet:  ADULT DIET; Regular; Low Fat/Low Chol/High Fiber/2 gm Na  Passing flatus: {YES/NO:94311}    Pain Management:   Patient states pain is manageable on current regimen: {YES/NO/NA:41806}    Skin:  Po Scale Score: 22  Interventions: Wound Offloading (Prevention Methods): Repositioning, Pillows, Turning    Patient Safety:  Fall Risk: Nursing Judgement-Fall Risk High(Add Comments): No  Fall Risk Interventions  Nursing Judgement-Fall Risk High(Add Comments): No  Toilet Every 2 Hours-In Advance of Need: Yes  Hourly Visual Checks: Awake, In bed  Fall Visual Posted: Socks  Room Door Open: Yes  Alarm On: Bed  Patient Moved  Closer to Nursing Station: No    Active Consults:   IP CONSULT TO SPIRITUAL SERVICES  IP CONSULT TO CARDIOLOGY  IP CONSULT TO PULMONOLOGY  IP CONSULT TO THORACIC SURGERY    Length of Stay:  Expected LOS: 9  Actual LOS: 8    Christine Vargas RN                            Problem: Discharge Planning  Goal: Discharge to home or other facility with appropriate resources  Outcome: Progressing  Flowsheets (Taken 5/19/2025 0730 by Tara Flores, RN)  Discharge to home or other facility with appropriate resources: Identify barriers to discharge with patient and caregiver     Problem: Skin/Tissue Integrity  Goal: Skin integrity remains intact  Description: 1.  Monitor for areas of redness and/or skin breakdown2.  Assess vascular access sites hourly3.  Every 4-6 hours minimum:  Change oxygen saturation probe site4.  Every 4-6 hours:  If on nasal continuous positive airway pressure, respiratory therapy assess nares and determine need for appliance change or resting period  Outcome: Progressing  Flowsheets  Taken 5/19/2025 1402 by Tara Flores, RN  Skin Integrity Remains Intact: Monitor for areas of redness and/or skin breakdown  Taken 5/19/2025 0730 by Tara Flores RN  Skin Integrity Remains Intact: Monitor for areas of redness and/or skin breakdown     Problem: Safety - Adult  Goal: Free from fall injury  Outcome: Progressing     Problem: Respiratory - Adult  Goal: Achieves optimal ventilation and oxygenation  5/19/2025 2122 by Christine Vargas RN  Outcome: Progressing  5/19/2025 1941 by Ormond, Brittany Michelle, RCP  Outcome: Progressing  5/19/2025 0957 by Rafa Cano, RT  Outcome: Progressing     Problem: Cardiovascular - Adult  Goal: Maintains optimal cardiac output and hemodynamic stability  Outcome: Progressing  Flowsheets (Taken 5/19/2025 0730 by Tara Flores, RN)  Maintains optimal cardiac output and hemodynamic stability: Monitor blood pressure and heart rate  Goal: Absence of cardiac dysrhythmias or

## 2025-05-20 NOTE — PROGRESS NOTES
1500:  Reviewed discharge instructions and prescriptions with patient and spouse.  All questions answered.  PIV x 2 removed.

## 2025-05-20 NOTE — PROGRESS NOTES
Cardiology Progress Note  5/20/2025     Admit Date: 5/11/2025  Admit Diagnosis: Acute respiratory failure with hypoxemia (HCC) [J96.01]  Acute hypoxemic respiratory failure (HCC) [J96.01]  CC: none currently   Cardiac Assessment/Plan:    1) AS, bicuspid AoV: severe AS by echo and cath  2) CM, non-ischemic: EF 35-40% on echo; Nl TSH.           *Elevated trop (200s): LVH w/ repol changes on ecg; atypical CP/SHAY.          *No focal CAD @ cath.  3) TAA: ascending Ao 5.1 cm 2/2024. 4.7 cm 5/2025     4) COPD, Severe per Dr De La Cruz; ongoing tobacco use  5) EtOH excess; cocaine use per report     Admitted w/ resp failure as noted below; improved w/ diuretics/steroids.    Open Rx of AS and ascending Ao rec; timing per CTS.       Echo 5/12/25:    Left Ventricle: Reduced left ventricular systolic function with a visually estimated EF of 35 - 40%. Left ventricle size is normal. Moderately increased wall thickness. Global hypokinesis present.    Right Ventricle: Right ventricle size is normal. Normal systolic function.    Aortic Valve: Not well visualized. Calcified cusps. Mild regurgitation. Severe stenosis of the aortic valve by provided gradient. AV mean gradient is 68 mmHg. AV area by continuity VTI is 0.7 cm2.    Left Atrium: Left atrium is dilated.    Right Atrium: Right atrium is dilated.    CTA 5/12/25:  No evidence of pulmonary embolism. Bilateral lower lobe mucous plugging, parabronchial soft tissue density, bilateral lower lobe consolidation. Dilatation of the ascending thoracic aorta, measuring 4.7 cm.    TAVR CTA shows likely bicuspid valve with ascending aortic aneurysm.    Cath 5/19/25: No focal CAD.  Severe AS (75 mmHg mean gradient across AoV); EDP 34.    5/19: No CP; less dyspnea; much less O2: down to 2L  BP ; HR 70-90s; sinus; 94% 2L  K 4; BUN 34; Cr 0.8; proBNP 3.7k; Hg 15.1.    5/20:  No CP/resting dyspnea; on RA  BP ; HR 70-90s; sinus; 92% RA; good UOP  K 3.7; Cr  0.9    Cardiac meds: asa; Lovenox 30 bid;      Rec: low dose diuretic (aldactone) for elevated EDP yesterday; avoid hypotension.  Open Rx of AS and ascending Ao rec; timing per CTS.    After surgery, will need ARB (or entresto) and toprol XL as BP tolerates d/t CM.  CM should improve w/ AoVR.    ____________________________________________________________________  Jose Díaz III is a 59 y.o. male presents with Acute respiratory failure with hypoxemia (HCC) [J96.01]  Acute hypoxemic respiratory failure (HCC) [J96.01].     As noted in H&P: \"59 M smoker (1 PPD x approx 40 yrs) with history of COPD on Trelegy inhaler who at baseline is highly functional, employed as an . He admits to approx 2 beers per day and occasional recreational substance use. Admitted in transfer from [facility] with acute hypoxemic respiratory failure. Symptoms began approx 2 weeks ago with exertional dyspnea, cough, yellow mucus, scant hemoptysis. He was treated by his primary care provider as COPD exacerbation with prednisone and antibiotics and noted some improvement but not resolution. In the past 2-3 days he has had increasing dyspnea which progressed to rest dyspnea with orthopnea noted on the night prior to admission. In the [facility] ED, he was hypoxic and tachypneic. His CXR revealed CM with mild interstitial prominence. His EKG revealed changes consistent with LVH and T wave inversions in V5, V6. Troponin was modestly elevated in mid 200s, BNP > 10,000. He received furosemide and bronchodilators with transient improvement, then deterioration requiring NIPPV. Therefore, [facility] CCM was consulted and accepted the patient in transfer. Upon arrival to the ICU, he was placed on NC O2. Although tachypneic (RR aprox 26/min) he was able to speak in full sentences with SpO2 94% 0n 6 LPM NC. However, he desaturated significantly when sitting to side of the bed to urinate. Therefore, NIPPV was resumed. His initial exam revealed distant breath sounds

## 2025-05-20 NOTE — DISCHARGE SUMMARY
Hospitalist Discharge Note    NAME:   Jose Díaz III   : 1965   MRN: [id number]     Admit date: 2025    Discharge date: 25    PCP: No primary care provider on file.    Discharge Diagnoses:    Acute hypoxic respiratory failure POA required 50 L, 60% oxygen by 2025, weaned to room air at discharge    Acute systolic congestive heart failure exacerbation LVEF 35% POA    Severe aortic stenosis Gradient 75 mm Hg on cath POA    Acute non-STEMI POA Troponin peak 260, no CAD on cath    Acute COPD exacerbation POA    Chronic smoking    Hemoptysis    Hyperglycemia due to steroids A1c - 5.2    Alcohol use disorder     Anxiety POA    Leukocytosis likely steroid related     Code Status: full    Discharge Medications:  Current Discharge Medication List        START taking these medications    Details   aspirin 81 MG chewable tablet Take 1 tablet by mouth daily  Qty: 30 tablet, Refills: 3      spironolactone (ALDACTONE) 25 MG tablet Take 0.5 tablets by mouth daily  Qty: 60 tablet, Refills: 2      pantoprazole (PROTONIX) 40 MG tablet Take 1 tablet by mouth every morning (before breakfast)  Qty: 30 tablet, Refills: 3      albuterol sulfate HFA (PROVENTIL;VENTOLIN;PROAIR) 108 (90 Base) MCG/ACT inhaler Inhale 2 puffs into the lungs every 4 hours as needed for Wheezing or Shortness of Breath  Qty: 18 g, Refills: 3           CONTINUE these medications which have CHANGED    Details   fluticasone-umeclidin-vilant (TRELEGY ELLIPTA) 100-62.5-25 MCG/ACT AEPB inhaler Inhale 1 puff into the lungs daily  Qty: 1 each, Refills: 3           STOP taking these medications       mupirocin (BACTROBAN) 2 % ointment Comments:   Reason for Stopping:               Follow-up Information       Follow up With Specialties Details Why Contact Info    Rashawn Hankins MD Pulmonary Disease, Critical Care Medicine Go on 2025 at 11:00am for your PULMONOLOGY hospital follow up with JUANCARLOS Cook. If needed, please cancel or reschedule

## 2025-05-20 NOTE — DISCHARGE INSTRUCTIONS
Severe aortic stenosis and a dilated ascending aortic root    Severe COPD    No smoking    Use spironolactone for fluid removal per Dr. Medrano's recommendation    Scheduled for aortic valve replacement and possible ascending aortic root repair on 6/4/2025 with Dr. Soto.     Avoid excessive sodium intake, do not salt foods   Try to take in less than 3000 mg or 3 grams of sodium per day     GOAL   NO shortness of breath  NO chest pain  NO weight gain  Minimize swelling of feet, ankles, legs, stomach or hands     Check daily weight    Record your weight daily.    Place the scale on a hard surface (not carpet).    Weigh yourself the same time each day, preferably before breakfast.    Weigh yourself without clothing, or make sure you wear the same type of clothing when you weigh yourself    If You have gained more than 3 pounds in one day or 5 pounds within 1 week, CONTACT YOUR CARDIOLOGIST OR PCP IMMEDIATELY   They can adjust your diuretics(fluid pills) and hopefully take off the excess fluid before you get to the point that you need to come to the hospital   Usually you will start to gain weight before you develop increasing edema or shortness of breath    Weight gain is an early warning sign that you may be starting to retain fluid    If start feeling SOB or develop swelling or shortness of breath when you lay down, CONTACT YOUR CARDIOLOGIST OR PCP IMMEDIATELY    Call EMS if you feel in distress or symptoms are real severe

## 2025-05-20 NOTE — PROGRESS NOTES
HAS-BLED Score                             Risk Factors      Points   Hypertension  Uncontrolled, >160 mmHg systolic    No=0   Renal disease  Dialysis, transplant, Cr>2.26 mg/dL or >200 µmol/L    No=0   Liver disease   Cirrhosis or bilirubin >2x normal with AST/ALT/AP >3x normal    No=0   Stroke history    No=0   Prior major bleeding or predisposition to bleeding     No=0   Labile  INR  Unstable/high INRs, time in therapeutic range <60%    No=0   Age >65    No=0   Medication usage predisposing to bleeding   Aspirin, clopidogrel, NSAIDs    No=0   Alcohol use   >7 drinks/week    Yes=1      HAS-BLED Score:    1:  Risk was 3.4% in one validation study and 1.02 bleeds per 100 patient-years in another validation study.      Has-BLED score: 1 low risk, 2 moderate risk, 3 or greater high risk for bleeding and anticoagulation should not be given     Admission Weight: Last Weight   Weight - Scale: 110 kg (242 lb 8.1 oz) Weight - Scale: 99.7 kg (219 lb 12.8 oz)       EXAM:      General: awake, alert, and oriented   Lungs:    wheezes bilaterally   Incision:  N/A   Heart:   Regular rate and rhythm   Abdomen:    soft, not-distended, non-tender   Extremities:  1+ pitting edema   Neurologic:  Gross motor and sensory apparatus intact       Lab Data Reviewed:   Recent Labs     05/19/25  0426 05/20/25  0343   WBC 8.2  --    HGB 15.1  --    HCT 46.9  --      --     140   K 4.0 3.7   BUN 34* 26*         Assessment:     Patient Active Problem List   Diagnosis    Acute hypoxemic respiratory failure (HCC)    Acute respiratory failure with hypoxemia (HCC)    Bilateral carotid artery stenosis    Preop cardiovascular exam       Plan/Recommendations/Medical Decision Making:     Procedure Type: Isolated AVR   Perioperative Outcome Estimate %   Operative Mortality 1.28%   Morbidity & Mortality 7.84%   Stroke 0.554%   Renal Failure 0.857%   Reoperation 3.16%   Prolonged Ventilation 5.52%   Deep  Sternal Wound Infection 0.121%   Long Hospital Stay (>14 days) 7.09%   Short Hospital Stay (<6 days)* 42.4%     Clinical Summary       Planned Surgery: Isolated AVR, Urgent, First cardiovascular surgery   Demographics: 59 year old, White, male, 99.7kg, 182.9cm, BMI: 29.8 kg/m²   Insurance/Payor: Commercial   Lab Values: Creatinine: 0.86 mg/dL, Hematocrit: 46.9%, WBC Count: 8.2 10³/?L, Platelet Count: 907158 cells/?L   Substance Abuse: Current smoker, Alcohol use: >= 8 drinks/week, Illicit Drug Use   Risk Factors / Comorbidities: Hypertension   Pulmonary RF: Moderate CLD   Cardiac Status: Acute heart failure, NYHA Class III, Ejection Fraction = 35%   Coronary Artery Disease: No coronary symptoms   Valve Disease: Mild AR, Trivial/Trace MR, Trivial/Trace TR     Severe AS of likely bicuspid valve, mild AI and dilation of ascending thoracic aorta of 4.7 cm:   Pt cleared for d/c from CS standpoint.   Scheduled for AVR, ascending repair, possible root on 6/4 with Dr. Soto.   Pt will be scheduled for PAT by our office. Will discuss Rx to hold/start preop at PAT.   Acute hypoxic respiratory failure, multifactorial as below, improving: O2 status as above. Per primary team for now.   Acute HFrEF, NYHA class IV, LVEF 35-40%, improving: No meds PTA. Diuresis per primary team/cards. Per Dr. Soto, will trend BNP. GDMT as tolerated. Strict I&O. Daily standing weights.   Elevated troponin: clean cath, no CAD  COPD exacerbation: patient of Dr. Rashawn Adams and their team is currently on board.  On Trelegy Ellipta PTA. On Solumedrol per primary team/pulmonary. Will need PFTs now that he is recovered from exacerbation. Will order with PAT workup  Scant hemoptysis: Thought 2/2 acute bronchitis. H&H stable as of this AM.   Mild bilateral carotid stenosis: Continue ASA.  Leukocytosis: Resolved. In the setting of prednisone for COPD exacerbation.  Asymetric edema and concern for DVT: Venous duplex and CTA chest negative for DVT/PE.

## 2025-05-20 NOTE — CARE COORDINATION
Pt clear from CM standpoint.    Transition of Care Plan:    RUR: 8% \"low risk\"  Prior Level of Functioning: Independent in ADL's  Disposition: Home with spouse  CHESTER: 5/20  If SNF or IPR: Date FOC offered: N/A  Follow up appointments: PCP/Specialist as indicated  DME needed: None  Transportation at discharge: Family to transport   IM/IMM Medicare/ letter given: N/A  Is patient a  and connected with VA? No  Caregiver Contact: Jacqueline Díaz (spouse), [phone]  Discharge Caregiver contacted prior to discharge? To be contacted   Care Conference needed? Not at this time  Barriers to discharge: Medical clearance      1339 PM: Chart reviewed. CM following for d/c planning. Pt to return home with spouse and return for CTS surgery on 6/4/25. No CM needs identified at this time.     05/20/25 1441   Services At/After Discharge   Transition of Care Consult (CM Consult) Discharge Planning   Services At/After Discharge Community Resources    Resource Information Provided? No   Mode of Transport at Discharge Other (see comment)  (Spouse)   Hospital Transport Time of Discharge 1500   Confirm Follow Up Transport Family   Condition of Participation: Discharge Planning   The Plan for Transition of Care is related to the following treatment goals: Return home independently   The Patient and/or Patient Representative was provided with a Choice of Provider? Patient   The Patient and/Or Patient Representative agree with the Discharge Plan? Yes     MARCUS Leblanc  Care Management  [facility]   [phone]

## 2025-05-20 NOTE — PROGRESS NOTES
Intake/Output Summary (Last 24 hours) at 5/20/2025 1421  Last data filed at 5/20/2025 0734  Gross per 24 hour   Intake 740 ml   Output 1500 ml   Net -760 ml        I had a face to face encounter and independently examined this patient on 5/20/2025, as outlined below:  PHYSICAL EXAM:  General: Alert, cooperative  EENT:  Anicteric sclerae.  Resp:  Moving air bilaterally, no wheezing, few crackles at bases    No accessory muscle use or retractions  CV:  Regular  rhythm, + SM, trace rt le edema   GI:  Soft, Non distended, Non tender.  +Bowel sounds  Neurologic:  Alert and awake, normal speech  Psych:   Pleasant  Skin:  No rashes.  No jaundice    Reviewed most current lab test results and cultures  YES  Reviewed most current radiology test results   YES  Review and summation of old records today    NO  Reviewed patient's current orders and MAR    YES  PMH/SH reviewed - no change compared to H&P    Procedures: see electronic medical records for all procedures/Xrays and details which were not copied into this note but were reviewed prior to creation of Plan.      LABS:  I reviewed today's most current labs and imaging studies.  Pertinent labs include:  Recent Labs     05/18/25 0459 05/19/25  0426   WBC 12.2* 8.2   HGB 15.6 15.1   HCT 48.0 46.9    210     Recent Labs     05/18/25  0459 05/19/25  0426 05/20/25  0343    140 140   K 3.6 4.0 3.7    103 107   CO2 31 32 31   GLUCOSE 115* 89 131*   BUN 37* 34* 26*   CREATININE 0.79 0.82 0.86   CALCIUM 8.7 8.4* 8.2*   MG  --  2.2  --        Signed: Aleksandr River Jr, MD

## 2025-05-20 NOTE — PROGRESS NOTES
PULMONOLOGY hospital follow-up transitional care appointment has been scheduled with JUANCARLOS Cook on 5/30/25 1100. Pending patient discharge.

## 2025-05-20 NOTE — CARDIO/PULMONARY
Chart reviewed: Patient is 59 y.o. male admitted with Acute respiratory failure with hypoxemia (HCC) [J96.01]  Acute hypoxemic respiratory failure (HCC) [J96.01]    Cardiac cath 5/20/25 without intervention:  \"CONCLUSION/post procedure Dx:   1. No focal CAD.   2. Severe AS (75 mmHg mean gradient across AoV); EDP 34.\"    \"Open Rx of AS and ascending Ao rec; timing per CTS.  \"

## 2025-05-23 LAB
ECHO BSA: 2.36 M2
VAS LEFT ABI: 1.09
VAS LEFT DORSALIS PEDIS BP: 136 MMHG
VAS LEFT PTA BP: 142 MMHG
VAS LEFT TBI: 0.9
VAS LEFT TOE PRESSURE: 117 MMHG
VAS RIGHT ABI: 1.08
VAS RIGHT ARM BP: 130 MMHG
VAS RIGHT DORSALIS PEDIS BP: 137 MMHG
VAS RIGHT PTA BP: 140 MMHG
VAS RIGHT TBI: 0.87
VAS RIGHT TOE PRESSURE: 113 MMHG

## 2025-05-28 NOTE — PROGRESS NOTES
Physician Progress Note      PATIENT:               LEROY KEENE  CSN #:                  [id number]  :                       1965  ADMIT DATE:       2025 6:25 PM  DISCH DATE:        2025 3:04 PM  RESPONDING  PROVIDER #:        Jr SAILAJA River MD          QUERY TEXT:    non-STEMI is documented in the medical record  PN. Please provide   additional clinical indicators supportive of your documentation. Or please   document if the diagnosis of non-STEMI has been ruled out after study.    The clinical indicators include:  pn   Acute non-STEMI  - Troponin peaked at 260.  Cardiology following and recommendations noted.    Start aspirin.  Cardiology will plan on coronary angiogram after respiratory   status is improved    card   1) AS: severe by provided doppler on echo, but difficult to see AoV  2) Elevated trop (200s): LVH w/ repol changes on ecg; atypical CP/SHAY.  3) CM: EF 35-40% on echo; Nl TSH.  4) TAA: ascending Ao 5.1 cm 2024. 4.7 2025    No CP; less dyspnea; much less O2: down to 2L  BP ; HR 70-90s; sinus; 94% 2L  K 4; BUN 34; Cr 0.8; proBNP 3.7k; Hg 15.1.  Cardiac meds: asa; Lovenox 30 bid; lasix 40 IV bid; aspirin 81  I have recommended diagnostic cath for definitive evaluation of the patient's   coronary anatomy and PCI if appropriate    card   Cath 25: No focal CAD.  Severe AS (75 mmHg mean gradient across AoV); EDP   34.  Options provided:  -- non-STEMI was ruled out  -- non-STEMI present as evidenced by, Please document evidence.  -- Other - I will add my own diagnosis  -- Disagree - Not applicable / Not valid  -- Disagree - Clinically unable to determine / Unknown  -- Refer to Clinical Documentation Reviewer    PROVIDER RESPONSE TEXT:    non-STEMI was ruled out after study.    Query created by: Nelia Lee on 2025 5:57 AM      Electronically signed by:  Jr SAILAJA River MD 2025 11:09 PM

## 2025-05-29 ENCOUNTER — HOSPITAL ENCOUNTER (OUTPATIENT)
Facility: HOSPITAL | Age: 60
End: 2025-05-29
Attending: THORACIC SURGERY (CARDIOTHORACIC VASCULAR SURGERY)
Payer: COMMERCIAL

## 2025-05-29 ENCOUNTER — HOSPITAL ENCOUNTER (OUTPATIENT)
Facility: HOSPITAL | Age: 60
Discharge: HOME | End: 2025-05-29
Payer: COMMERCIAL

## 2025-05-29 VITALS
BODY MASS INDEX: 32.47 KG/M2 | OXYGEN SATURATION: 96 % | HEIGHT: 71 IN | SYSTOLIC BLOOD PRESSURE: 85 MMHG | WEIGHT: 231.92 LBS | DIASTOLIC BLOOD PRESSURE: 68 MMHG | RESPIRATION RATE: 16 BRPM | TEMPERATURE: 98.4 F | HEART RATE: 73 BPM

## 2025-05-29 VITALS — OXYGEN SATURATION: 98 %

## 2025-05-29 DIAGNOSIS — I35.0 NONRHEUMATIC AORTIC VALVE STENOSIS: ICD-10-CM

## 2025-05-29 LAB
ABO + RH BLD: NORMAL
BLOOD GROUP ANTIBODIES SERPL: NORMAL
HISTORY CHECK: NORMAL
SARS-COV-2 RNA RESP QL NAA+PROBE: NOT DETECTED
SOURCE: NORMAL
SPECIMEN EXP DATE BLD: NORMAL

## 2025-05-29 PROCEDURE — 86923 COMPATIBILITY TEST ELECTRIC: CPT

## 2025-05-29 PROCEDURE — 71046 X-RAY EXAM CHEST 2 VIEWS: CPT

## 2025-05-29 PROCEDURE — 86850 RBC ANTIBODY SCREEN: CPT

## 2025-05-29 PROCEDURE — 86900 BLOOD TYPING SEROLOGIC ABO: CPT

## 2025-05-29 PROCEDURE — 86901 BLOOD TYPING SEROLOGIC RH(D): CPT

## 2025-05-29 PROCEDURE — 93005 ELECTROCARDIOGRAM TRACING: CPT | Performed by: THORACIC SURGERY (CARDIOTHORACIC VASCULAR SURGERY)

## 2025-05-29 PROCEDURE — 6370000000 HC RX 637 (ALT 250 FOR IP): Performed by: THORACIC SURGERY (CARDIOTHORACIC VASCULAR SURGERY)

## 2025-05-29 PROCEDURE — 36415 COLL VENOUS BLD VENIPUNCTURE: CPT

## 2025-05-29 PROCEDURE — 87635 SARS-COV-2 COVID-19 AMP PRB: CPT

## 2025-05-29 PROCEDURE — 94640 AIRWAY INHALATION TREATMENT: CPT

## 2025-05-29 PROCEDURE — 94729 DIFFUSING CAPACITY: CPT

## 2025-05-29 PROCEDURE — 94060 EVALUATION OF WHEEZING: CPT

## 2025-05-29 RX ORDER — SODIUM CHLORIDE 9 MG/ML
INJECTION, SOLUTION INTRAVENOUS PRN
Status: CANCELLED | OUTPATIENT
Start: 2025-05-29

## 2025-05-29 RX ORDER — SODIUM CHLORIDE 0.9 % (FLUSH) 0.9 %
5-40 SYRINGE (ML) INJECTION EVERY 12 HOURS SCHEDULED
Status: CANCELLED | OUTPATIENT
Start: 2025-05-29

## 2025-05-29 RX ORDER — AMIODARONE HYDROCHLORIDE 100 MG/1
400 TABLET ORAL 2 TIMES DAILY
Qty: 40 TABLET | Refills: 0 | Status: SHIPPED | OUTPATIENT
Start: 2025-05-30 | End: 2025-06-04

## 2025-05-29 RX ORDER — CHLORHEXIDINE GLUCONATE ORAL RINSE 1.2 MG/ML
15 SOLUTION DENTAL 2 TIMES DAILY
Qty: 60 ML | Refills: 0 | Status: SHIPPED | OUTPATIENT
Start: 2025-06-02 | End: 2025-06-04

## 2025-05-29 RX ORDER — SODIUM CHLORIDE, SODIUM LACTATE, POTASSIUM CHLORIDE, CALCIUM CHLORIDE 600; 310; 30; 20 MG/100ML; MG/100ML; MG/100ML; MG/100ML
INJECTION, SOLUTION INTRAVENOUS CONTINUOUS
OUTPATIENT
Start: 2025-06-04

## 2025-05-29 RX ORDER — ACETAMINOPHEN 325 MG/1
1000 TABLET ORAL ONCE
Status: CANCELLED | OUTPATIENT
Start: 2025-05-29 | End: 2025-05-29

## 2025-05-29 RX ORDER — IPRATROPIUM BROMIDE AND ALBUTEROL SULFATE 2.5; .5 MG/3ML; MG/3ML
1 SOLUTION RESPIRATORY (INHALATION)
Status: COMPLETED | OUTPATIENT
Start: 2025-05-29 | End: 2025-05-29

## 2025-05-29 RX ORDER — SODIUM CHLORIDE 0.9 % (FLUSH) 0.9 %
5-40 SYRINGE (ML) INJECTION PRN
Status: CANCELLED | OUTPATIENT
Start: 2025-05-29

## 2025-05-29 RX ORDER — GABAPENTIN 100 MG/1
300 CAPSULE ORAL ONCE
Status: CANCELLED | OUTPATIENT
Start: 2025-05-29 | End: 2025-05-29

## 2025-05-29 RX ORDER — MUPIROCIN 20 MG/G
OINTMENT TOPICAL 2 TIMES DAILY
Qty: 1 EACH | Refills: 0 | Status: SHIPPED | OUTPATIENT
Start: 2025-06-02 | End: 2025-06-04

## 2025-05-29 RX ADMIN — IPRATROPIUM BROMIDE AND ALBUTEROL SULFATE 1 DOSE: .5; 3 SOLUTION RESPIRATORY (INHALATION) at 10:19

## 2025-05-29 ASSESSMENT — PAIN SCALES - GENERAL: PAINLEVEL_OUTOF10: 0

## 2025-05-29 NOTE — PROGRESS NOTES

## 2025-05-29 NOTE — PROGRESS NOTES
Notified Jasmyne Oconnell NP that patient's BP is 85/61 in PAT. Jasmyne in room to see patient. Patient denies dizziness. Jasmyne told patient to stop his spironolactone now for surgery. Patient verbalized understanding.

## 2025-05-29 NOTE — H&P
[facility]  Heart Transplant  Discharge Summary      Patient Name: Shivani Sheets  MRN: [id number]  Admission Date: 1/12/2019  Hospital Length of Stay: 6 days  Discharge Date and Time: 01/18/2019 3:08 PM  Attending Physician: Surya Hernandez MD   Discharging Provider: Denilson Tyson MD  Primary Care Provider: Eula Weiss MD     HPI: 67 yo F w/ PMHx  pulmonary HTN (on adempas/uptravi), diastolic dysfunction, pAF(on coumadin), central retinal artery occlusion without significant carotid artery stenosis, CAD with remote PCI, ESRD(LUE AVF) secondary to HTN, s/p failed kidney transplant, PUD, STEFFANY on CPAP, hypothyroidism, RA, HLD and obesity who presents with bright red per rectum that started 2 days ago.She had a single episode of non bloody emesis earlier today after she became dizzy from standing up too quickly. Her dizziness and lightheadedness improves with sitting and lying down. She was admitted to the CICU in the past for a similar episode 9/2018 and received 6 U PRBC.Upper gi endoscopy revealed  Normal esophagus,a few medium sessile polyps with no bleeding and no stigmata of recent bleeding were found in the gastric antrum.       [facility] consulted for Transfer of service as she has history of PH and on meds.  Patient developed right lower extremity swelling due to an IO in Right leg where she was being transfused. IO was dislodged and removed. Will do US venous doppler.    Procedure(s) (LRB):  EGD (ESOPHAGOGASTRODUODENOSCOPY) (N/A)     Hospital Course: Patient had 5 PRBC and 2 units FFP given with correction of anemia to baseline level of 8 on d/c from admission Hb level of 4.1. She had also had EGD with Push enteroscope and VCE without bleeding source found. We discussed about pruning medication that were increasing her bleeding risk. There include coumadin, ASA and Adempas. Patient agreed to discontinue ASA and continue coumadin and Adempas due to protective value from A fib and  therapeutic value for PH. She did develop cellulitis of the R-leg from IO placed during admission for medication infusion. Vancomycin 1250mg one dose was given to last her for the next 48hrs since she is on HD. She will start Doxycycline 500mg BID after her HD on 1/21/2019.     Consults (From admission, onward)        Status Ordering Provider     Inpatient consult to Gastroenterology  Once     Provider:  (Not yet assigned)    Completed KHAN, BEHRAM     Inpatient consult to Nephrology  Once     Provider:  (Not yet assigned)    Completed KHAN, BEHRAM     [facility]  Once     Provider:  (Not yet assigned)    Completed JOSE SADLER     Inpatient consult to PICC team ([facility])  Once     Provider:  (Not yet assigned)    Completed AYAZ HERNANDEZ          Significant Diagnostic Studies: Labs:   [facility]   Recent Labs   Lab 01/17/19  0536 01/18/19  0615   * 137   K 4.3 4.2    102   CO2 21* 29   GLU 94 99   BUN 28* 16   CREATININE 8.4* 6.2*   CALCIUM 7.4* 7.3*   PROT 5.2* 5.2*   ALBUMIN 2.4* 2.3*   BILITOT 0.7 0.7   ALKPHOS 46* 47*   AST 15 14   ALT 12 11   ANIONGAP 9 6*   ESTGFRAFRICA 5.2* 7.5*   EGFRNONAA 4.5* 6.5*    and INR   Lab Results   Component Value Date    INR 1.7 (H) 01/18/2019    INR 1.3 (H) 01/17/2019    INR 1.4 (H) 01/16/2019       Pending Diagnostic Studies:     Procedure Component Value Units Date/Time    Protime-INR [[id number]]     Order Status:  Sent Lab Status:  No result     Specimen:  Blood         Final Active Diagnoses:    Diagnosis Date Noted POA    PRINCIPAL PROBLEM:  Gastrointestinal hemorrhage [K92.2] 01/12/2019 Yes    ESRD (end stage renal disease) on dialysis [N18.6, Z99.2]  Not Applicable    Acute blood loss anemia [D62] 09/14/2018 Yes    Controlled type 2 diabetes mellitus with both eyes affected by proliferative retinopathy and macular edema, without long-term current use of insulin [E11.3513] 08/21/2018 Yes    Symptomatic anemia [D64.9] 02/27/2018 Yes     Atrial fibrillation [I48.91] 02/11/2014 Yes    Pulmonary HTN [I27.20] 02/11/2014 Yes    Hypothyroidism [E03.9]  Yes    Anticoagulation monitoring by pharmacist [Z79.01] 09/18/2012 Not Applicable    Chronic diastolic heart failure [I50.32] 08/10/2012 Yes    CAD (coronary artery disease) [I25.10] 08/10/2012 Yes    S/P PTCA (percutaneous transluminal coronary angioplasty) [Z98.61] 08/10/2012 Not Applicable    Obstructive sleep apnea [G47.33] 08/10/2012 Yes    Rheumatoid arthritis [M06.9] 08/10/2012 Yes    Hyperlipidemia [E78.5] 08/10/2012 Yes    ESRD from HTN started RRT 1999 [N18.6] 01/01/1999 Yes      Problems Resolved During this Admission:      Discharged Condition: good    Disposition: Home or Self Care    Follow Up:    Patient Instructions:      Ambulatory referral to Outpatient Case Management   Referral Priority: Routine Referral Type: Consultation   Referral Reason: Specialty Services Required   Number of Visits Requested: 1     Diet Cardiac     Diet renal     Notify your health care provider if you experience any of the following:  temperature >100.4     Notify your health care provider if you experience any of the following:  persistent dizziness, light-headedness, or visual disturbances     Activity as tolerated     Medications:  Reconciled Home Medications:      Medication List      START taking these medications    doxycycline 100 MG tablet  Commonly known as:  VIBRA-TABS  Take 1 tablet (100 mg total) by mouth every 12 (twelve) hours. for 5 days  Start taking on:  1/21/2019        CONTINUE taking these medications    ADEMPAS 2 mg Tab tablet  Generic drug:  riociguat  Take 2 mg by mouth 3 (three) times daily.     ALPHAGAN P 0.1 % Drop  Generic drug:  brimonidine 0.1%  once daily.     diltiaZEM 300 MG 24 hr capsule  Commonly known as:  CARDIZEM CD  Take 1 capsule (300 mg total) by mouth once daily.     dorzolamide-timolol 2-0.5% 22.3-6.8 mg/mL ophthalmic solution  Commonly known as:   COSOPT  INSTILL 1 DROP IN BOTH EYES TWICE DAILY     HYDROcodone-acetaminophen 7.5-325 mg per tablet  Commonly known as:  NORCO  TK 1 T PO Q 6 H PRN     latanoprost 0.005 % ophthalmic solution  INSTILL 1 DROP IN BOTH EYES EVERY DAY AT BEDTIME     levothyroxine 100 MCG tablet  Commonly known as:  SYNTHROID  Take 100 mcg by mouth. 1 Tablet Oral Every day     LIPITOR 10 MG tablet  Generic drug:  atorvastatin  TAKE 1 BY MOUTH ONCE A DAY     pantoprazole 40 MG tablet  Commonly known as:  PROTONIX  TAKE 1 TABLET BY MOUTH ONCE DAILY     traMADol 50 mg tablet  Commonly known as:  ULTRAM  TK 1 T PO  Q 8 H PRN     UPTRAVI 1,000 mcg Tab  Generic drug:  selexipag  Take 1,000 mcg by mouth 2 (two) times daily.     warfarin 5 MG tablet  Commonly known as:  COUMADIN  TAKE 1 1/2 TABLETS BY MOUTH DAILY ON TUESDAY, THURSDAY AND SATURDAY, THEN TAKE 1 TABLET DAILY ON MONDAY, WEDNESDAY, FRIDAY AND SUNDAY        STOP taking these medications    aspirin 81 MG Chew     cyclobenzaprine 5 MG tablet  Commonly known as:  FLEXERIL     lidocaine-prilocaine cream  Commonly known as:  EMLA     polyethylene glycol 240-22.72-6.72 -5.84 gram Solr  Commonly known as:  COLYTE            Denilson Tyson MD  Heart Transplant  [facility]   not to use upon d/c        Signed By: Jasmyne Oconnell, BENJAMIN - NP     May 29, 2025

## 2025-05-29 NOTE — PROGRESS NOTES
[facility]  Cardiac Preoperative Instructions        Surgery Date 6/4/25          Time of Arrival to be called on 6/3 between 2-5pm [phone]     1. On the day of your surgery, please report to the Surgical Services Registration Desk and sign in at your designated time. The Surgery Center is located to the right of the Emergency Room.     2. You must have someone to drive you home after discharge. You should not drive a car for 30 days following surgery.     3. Refer to your handbook for instructions on drinking liquids prior to surgery.    4. We recommend you do not drink any alcoholic beverages 1 week prior to your surgery. Also, no smoking 1 week prior to surgery.    5. Wear comfortable clothes.  Wear glasses instead of contacts.  Do not bring any money or jewelry. Please bring picture ID, insurance card, and any prearranged co-payment or hospital payment.  Do not wear make-up, particularly mascara the morning of your surgery.  Do not wear nail polish, particularly if you are having foot /hand surgery.  Wear your hair loose or down, no ponytails, buns, eleonora pins or clips.  All body piercings must be removed.  Please do not shave for 48 hours prior to surgery. Shaving of the face is acceptable. Please see the attached Soap/Hibiclens bathing instructions.    6. You should understand that if you do not follow these instructions your surgery may be cancelled.  If your physical condition changes (I.e. fever, cold or flu) please contact your surgeon as soon as possible.    7. It is important that you be on time.  If a situation occurs where you may be late, please call [phone] (OR Holding Area).    8. If you have any questions and or problems, please call [phone] (Pre-admission Testing).    9. Your surgery time may be subject to change.  You will receive a phone call the evening prior with your arrival time.    Stop spironolactone now per JACK Medley.  Start Amiodarone twice a day

## 2025-05-29 NOTE — CONSENT
Informed Consent for Blood Component Transfusion Note    I have discussed with the patient the rationale for blood component transfusion; its benefits in treating or preventing fatigue, organ damage, or death; and its risk which includes mild transfusion reactions, rare risk of blood borne infection, or more serious but rare reactions. I have discussed the alternatives to transfusion, including the risk and consequences of not receiving transfusion. The patient had an opportunity to ask questions and had agreed to proceed with transfusion of blood components.    Electronically signed by BENJAMIN Delgadillo NP on 5/29/25 at 2:32 PM EDT  
social

## 2025-05-30 LAB
EKG ATRIAL RATE: 78 BPM
EKG DIAGNOSIS: NORMAL
EKG P AXIS: 47 DEGREES
EKG P-R INTERVAL: 210 MS
EKG Q-T INTERVAL: 398 MS
EKG QRS DURATION: 110 MS
EKG QTC CALCULATION (BAZETT): 453 MS
EKG R AXIS: -37 DEGREES
EKG T AXIS: 154 DEGREES
EKG VENTRICULAR RATE: 78 BPM

## 2025-06-02 ENCOUNTER — ANESTHESIA EVENT (OUTPATIENT)
Facility: HOSPITAL | Age: 60
End: 2025-06-02
Payer: COMMERCIAL

## 2025-06-03 NOTE — PROCEDURES
Pulmonary Function Test Report          PATIENT ID: Jose Díaz III  MRN: [id number]   YOB: 1965    DATE OF ADMISSION: [id number]    PRIMARY CARE PROVIDER: No primary care provider on file.       Spirometry:  Spirometry is normal.    Bronchodilator response:  No significant improvement with bronchodilator therapy    Volumes:  Lung volumes not performed.    Diffusion:  Diffusing capacity is normal.    Flow-Volume:  The flow-volume loop is compatible with an obstructive process.      Signed:   Florinda Taylor MD  6/3/2025  6:15 PM

## 2025-06-04 ENCOUNTER — HOSPITAL ENCOUNTER (INPATIENT)
Facility: HOSPITAL | Age: 60
LOS: 6 days | Discharge: HOME HEALTH | DRG: 219 | End: 2025-06-10
Attending: THORACIC SURGERY (CARDIOTHORACIC VASCULAR SURGERY) | Admitting: THORACIC SURGERY (CARDIOTHORACIC VASCULAR SURGERY)
Payer: COMMERCIAL

## 2025-06-04 ENCOUNTER — ANESTHESIA (OUTPATIENT)
Facility: HOSPITAL | Age: 60
End: 2025-06-04
Payer: COMMERCIAL

## 2025-06-04 ENCOUNTER — APPOINTMENT (OUTPATIENT)
Facility: HOSPITAL | Age: 60
DRG: 219 | End: 2025-06-04
Attending: THORACIC SURGERY (CARDIOTHORACIC VASCULAR SURGERY)
Payer: COMMERCIAL

## 2025-06-04 ENCOUNTER — HOSPITAL ENCOUNTER (OUTPATIENT)
Facility: HOSPITAL | Age: 60
Discharge: HOME | End: 2025-06-06
Attending: THORACIC SURGERY (CARDIOTHORACIC VASCULAR SURGERY)

## 2025-06-04 DIAGNOSIS — Z98.890 S/P LEFT ATRIAL APPENDAGE LIGATION: ICD-10-CM

## 2025-06-04 DIAGNOSIS — Z95.2 S/P AVR: ICD-10-CM

## 2025-06-04 DIAGNOSIS — I35.0 SEVERE AORTIC STENOSIS: Primary | ICD-10-CM

## 2025-06-04 PROBLEM — Z86.79 S/P AORTIC ANEURYSM REPAIR: Status: ACTIVE | Noted: 2025-06-04

## 2025-06-04 LAB
ACUTE KIDNEY INJURY RISK NEPHROCHECK: 0.81 (ref 0–0.3)
ALBUMIN SERPL-MCNC: 3.6 G/DL (ref 3.5–5)
ALBUMIN SERPL-MCNC: 3.6 G/DL (ref 3.5–5)
ALBUMIN/GLOB SERPL: 2.6 (ref 1.1–2.2)
ALBUMIN/GLOB SERPL: 2.8 (ref 1.1–2.2)
ALP SERPL-CCNC: 25 U/L (ref 45–117)
ALP SERPL-CCNC: 26 U/L (ref 45–117)
ALT SERPL-CCNC: 14 U/L (ref 12–78)
ALT SERPL-CCNC: 16 U/L (ref 12–78)
ANION GAP BLD CALC-SCNC: 10 (ref 10–20)
ANION GAP BLD CALC-SCNC: 10 (ref 10–20)
ANION GAP BLD CALC-SCNC: 11 (ref 10–20)
ANION GAP BLD CALC-SCNC: 16 (ref 10–20)
ANION GAP BLD CALC-SCNC: 6 (ref 10–20)
ANION GAP BLD CALC-SCNC: 7 (ref 10–20)
ANION GAP BLD CALC-SCNC: 7 (ref 10–20)
ANION GAP BLD CALC-SCNC: 8 (ref 10–20)
ANION GAP BLD CALC-SCNC: 8 (ref 10–20)
ANION GAP SERPL CALC-SCNC: 0 MMOL/L (ref 2–12)
ANION GAP SERPL CALC-SCNC: 2 MMOL/L (ref 2–12)
APTT PPP: 37.6 SEC (ref 22.1–31)
ARTERIAL PATENCY WRIST A: ABNORMAL
AST SERPL-CCNC: 31 U/L (ref 15–37)
AST SERPL-CCNC: 43 U/L (ref 15–37)
BASE DEFICIT BLD-SCNC: 1.6 MMOL/L
BASE DEFICIT BLD-SCNC: 3.6 MMOL/L
BASE DEFICIT BLD-SCNC: 4.6 MMOL/L
BASE DEFICIT BLD-SCNC: 9.1 MMOL/L
BASE DEFICIT BLDA-SCNC: 5.7 MMOL/L
BASE EXCESS BLD CALC-SCNC: 0.7 MMOL/L
BASE EXCESS BLD CALC-SCNC: 2 MMOL/L
BASE EXCESS BLD CALC-SCNC: 2.2 MMOL/L
BASE EXCESS BLD CALC-SCNC: 2.8 MMOL/L
BASE EXCESS BLD CALC-SCNC: 3.3 MMOL/L
BASE EXCESS BLD CALC-SCNC: 3.7 MMOL/L
BASE EXCESS BLD CALC-SCNC: 3.7 MMOL/L
BASOPHILS # BLD: 0.05 K/UL (ref 0–0.1)
BASOPHILS NFR BLD: 0.3 % (ref 0–1)
BDY SITE: ABNORMAL
BILIRUB SERPL-MCNC: 1.5 MG/DL (ref 0.2–1)
BILIRUB SERPL-MCNC: 1.6 MG/DL (ref 0.2–1)
BUN SERPL-MCNC: 19 MG/DL (ref 6–20)
BUN SERPL-MCNC: 21 MG/DL (ref 6–20)
BUN/CREAT SERPL: 20 (ref 12–20)
BUN/CREAT SERPL: 26 (ref 12–20)
CA-I BLD-MCNC: 1.12 MMOL/L (ref 1.15–1.33)
CA-I BLD-MCNC: 1.12 MMOL/L (ref 1.15–1.33)
CA-I BLD-MCNC: 1.13 MMOL/L (ref 1.15–1.33)
CA-I BLD-MCNC: 1.14 MMOL/L (ref 1.15–1.33)
CA-I BLD-MCNC: 1.14 MMOL/L (ref 1.15–1.33)
CA-I BLD-MCNC: 1.15 MMOL/L (ref 1.15–1.33)
CA-I BLD-MCNC: 1.16 MMOL/L (ref 1.15–1.33)
CA-I BLD-MCNC: 1.18 MMOL/L (ref 1.15–1.33)
CA-I BLD-MCNC: 1.25 MMOL/L (ref 1.15–1.33)
CA-I BLD-MCNC: 1.27 MMOL/L (ref 1.15–1.33)
CA-I BLD-MCNC: 1.43 MMOL/L (ref 1.15–1.33)
CA-I BLD-SCNC: 1.14 MMOL/L (ref 1.13–1.32)
CALCIUM SERPL-MCNC: 7.7 MG/DL (ref 8.5–10.1)
CALCIUM SERPL-MCNC: 8 MG/DL (ref 8.5–10.1)
CHLORIDE BLD-SCNC: 101 MMOL/L (ref 100–111)
CHLORIDE BLD-SCNC: 102 MMOL/L (ref 100–111)
CHLORIDE BLD-SCNC: 103 MMOL/L (ref 100–111)
CHLORIDE BLD-SCNC: 104 MMOL/L (ref 100–111)
CHLORIDE BLD-SCNC: 109 MMOL/L (ref 100–111)
CHLORIDE BLD-SCNC: 109 MMOL/L (ref 100–111)
CHLORIDE BLD-SCNC: 111 MMOL/L (ref 100–111)
CHLORIDE BLD-SCNC: 113 MMOL/L (ref 100–111)
CHLORIDE SERPL-SCNC: 112 MMOL/L (ref 97–108)
CHLORIDE SERPL-SCNC: 112 MMOL/L (ref 97–108)
CO2 BLD-SCNC: 22 MMOL/L (ref 22–29)
CO2 BLD-SCNC: 23 MMOL/L (ref 22–29)
CO2 BLD-SCNC: 25 MMOL/L (ref 22–29)
CO2 BLD-SCNC: 25 MMOL/L (ref 22–29)
CO2 BLD-SCNC: 27 MMOL/L (ref 22–29)
CO2 BLD-SCNC: 28 MMOL/L (ref 22–29)
CO2 BLD-SCNC: 28 MMOL/L (ref 22–29)
CO2 BLD-SCNC: 29 MMOL/L (ref 22–29)
CO2 BLD-SCNC: 30 MMOL/L (ref 22–29)
CO2 SERPL-SCNC: 28 MMOL/L (ref 21–32)
CO2 SERPL-SCNC: 29 MMOL/L (ref 21–32)
CREAT SERPL-MCNC: 0.82 MG/DL (ref 0.7–1.3)
CREAT SERPL-MCNC: 0.97 MG/DL (ref 0.7–1.3)
CREAT UR-MCNC: 0.7 MG/DL (ref 0.6–1.3)
CREAT UR-MCNC: 0.8 MG/DL (ref 0.6–1.3)
CREAT UR-MCNC: 0.9 MG/DL (ref 0.6–1.3)
DIFFERENTIAL METHOD BLD: ABNORMAL
ECHO BSA: 2.32 M2
EOSINOPHIL # BLD: 0.07 K/UL (ref 0–0.4)
EOSINOPHIL NFR BLD: 0.4 % (ref 0–7)
ERYTHROCYTE [DISTWIDTH] IN BLOOD BY AUTOMATED COUNT: 13.8 % (ref 11.5–14.5)
ERYTHROCYTE [DISTWIDTH] IN BLOOD BY AUTOMATED COUNT: 13.8 % (ref 11.5–14.5)
ERYTHROCYTE [DISTWIDTH] IN BLOOD BY AUTOMATED COUNT: 13.9 % (ref 11.5–14.5)
FIBRINOGEN PPP-MCNC: 189 MG/DL (ref 200–475)
FIO2 ON VENT: 80 %
GLOBULIN SER CALC-MCNC: 1.3 G/DL (ref 2–4)
GLOBULIN SER CALC-MCNC: 1.4 G/DL (ref 2–4)
GLUCOSE BLD STRIP.AUTO-MCNC: 101 MG/DL (ref 74–99)
GLUCOSE BLD STRIP.AUTO-MCNC: 105 MG/DL (ref 65–117)
GLUCOSE BLD STRIP.AUTO-MCNC: 130 MG/DL (ref 74–99)
GLUCOSE BLD STRIP.AUTO-MCNC: 136 MG/DL (ref 74–99)
GLUCOSE BLD STRIP.AUTO-MCNC: 157 MG/DL (ref 74–99)
GLUCOSE BLD STRIP.AUTO-MCNC: 160 MG/DL (ref 74–99)
GLUCOSE BLD STRIP.AUTO-MCNC: 168 MG/DL (ref 74–99)
GLUCOSE BLD STRIP.AUTO-MCNC: 168 MG/DL (ref 74–99)
GLUCOSE BLD STRIP.AUTO-MCNC: 170 MG/DL (ref 74–99)
GLUCOSE BLD STRIP.AUTO-MCNC: 174 MG/DL (ref 74–99)
GLUCOSE BLD STRIP.AUTO-MCNC: 177 MG/DL (ref 74–99)
GLUCOSE BLD STRIP.AUTO-MCNC: 233 MG/DL (ref 74–99)
GLUCOSE BLD-MCNC: 121 MG/DL (ref 74–99)
GLUCOSE BLD-MCNC: 132 MG/DL (ref 74–99)
GLUCOSE BLD-MCNC: 150 MG/DL (ref 74–99)
GLUCOSE BLD-MCNC: 163 MG/DL (ref 74–99)
GLUCOSE BLD-MCNC: 170 MG/DL (ref 74–99)
GLUCOSE BLD-MCNC: 180 MG/DL (ref 74–99)
GLUCOSE SERPL-MCNC: 126 MG/DL (ref 65–100)
GLUCOSE SERPL-MCNC: 178 MG/DL (ref 65–100)
HCO3 BLDA-SCNC: 22 MMOL/L
HCO3 BLDA-SCNC: 22 MMOL/L (ref 22–26)
HCO3 BLDA-SCNC: 23 MMOL/L
HCO3 BLDA-SCNC: 25 MMOL/L
HCO3 BLDA-SCNC: 26 MMOL/L
HCO3 BLDA-SCNC: 27 MMOL/L
HCO3 BLDA-SCNC: 28 MMOL/L
HCO3 BLDA-SCNC: 29 MMOL/L
HCO3 BLDA-SCNC: 29 MMOL/L
HCO3 BLDA-SCNC: 30 MMOL/L
HCO3 BLDA-SCNC: 31 MMOL/L
HCO3 BLDA-SCNC: 31 MMOL/L
HCT VFR BLD AUTO: 26 % (ref 36.6–50.3)
HCT VFR BLD AUTO: 30.3 % (ref 36.6–50.3)
HCT VFR BLD AUTO: 33 % (ref 36.6–50.3)
HGB BLD-MCNC: 10.3 G/DL (ref 12.1–17)
HGB BLD-MCNC: 10.3 G/DL (ref 12.1–17)
HGB BLD-MCNC: 10.4 G/DL (ref 12.1–17)
HGB BLD-MCNC: 10.4 G/DL (ref 12.1–17)
HGB BLD-MCNC: 10.7 G/DL (ref 12.1–17)
HGB BLD-MCNC: 10.8 G/DL (ref 12.1–17)
HGB BLD-MCNC: 11.2 G/DL (ref 12.1–17)
HGB BLD-MCNC: 11.3 G/DL (ref 12.1–17)
HGB BLD-MCNC: 11.5 G/DL (ref 12.1–17)
HGB BLD-MCNC: 8.5 G/DL (ref 12.1–17)
HGB BLD-MCNC: 8.6 G/DL (ref 12.1–17)
HGB BLD-MCNC: 9.8 G/DL (ref 12.1–17)
HGB BLD-MCNC: 9.8 G/DL (ref 12.1–17)
IMM GRANULOCYTES # BLD AUTO: 0.37 K/UL (ref 0–0.04)
IMM GRANULOCYTES NFR BLD AUTO: 2 % (ref 0–0.5)
INR PPP: 1.4 (ref 0.9–1.1)
INR PPP: 1.4 (ref 0.9–1.1)
LACTATE BLD-SCNC: 0.33 MMOL/L (ref 0.4–2)
LACTATE BLD-SCNC: 0.88 MMOL/L (ref 0.4–2)
LACTATE BLD-SCNC: 0.93 MMOL/L (ref 0.4–2)
LACTATE BLD-SCNC: 2.31 MMOL/L (ref 0.4–2)
LACTATE BLD-SCNC: 2.4 MMOL/L (ref 0.4–2)
LACTATE BLD-SCNC: 2.48 MMOL/L (ref 0.4–2)
LACTATE BLD-SCNC: 2.48 MMOL/L (ref 0.4–2)
LACTATE BLD-SCNC: <0.3 MMOL/L (ref 0.4–2)
LYMPHOCYTES # BLD: 1.03 K/UL (ref 0.8–3.5)
LYMPHOCYTES NFR BLD: 5.6 % (ref 12–49)
MAGNESIUM SERPL-MCNC: 2.6 MG/DL (ref 1.6–2.4)
MCH RBC QN AUTO: 30.4 PG (ref 26–34)
MCH RBC QN AUTO: 30.6 PG (ref 26–34)
MCH RBC QN AUTO: 30.7 PG (ref 26–34)
MCHC RBC AUTO-ENTMCNC: 32.3 G/DL (ref 30–36.5)
MCHC RBC AUTO-ENTMCNC: 32.4 G/DL (ref 30–36.5)
MCHC RBC AUTO-ENTMCNC: 32.7 G/DL (ref 30–36.5)
MCV RBC AUTO: 92.9 FL (ref 80–99)
MCV RBC AUTO: 94.6 FL (ref 80–99)
MCV RBC AUTO: 94.7 FL (ref 80–99)
MONOCYTES # BLD: 1.36 K/UL (ref 0–1)
MONOCYTES NFR BLD: 7.5 % (ref 5–13)
NEUTS SEG # BLD: 15.36 K/UL (ref 1.8–8)
NEUTS SEG NFR BLD: 84.2 % (ref 32–75)
NRBC # BLD: 0 K/UL (ref 0–0.01)
NRBC BLD-RTO: 0 PER 100 WBC
PCO2 BLD: 43.3 MMHG (ref 35–48)
PCO2 BLD: 49.7 MMHG (ref 35–48)
PCO2 BLD: 51.2 MMHG (ref 35–48)
PCO2 BLD: 56.2 MMHG (ref 35–48)
PCO2 BLD: 58.5 MMHG (ref 35–48)
PCO2 BLD: 58.6 MMHG (ref 35–48)
PCO2 BLD: 63.2 MMHG (ref 35–48)
PCO2 BLD: 63.4 MMHG (ref 35–48)
PCO2 BLD: 68.3 MMHG (ref 35–48)
PCO2 BLD: 72.6 MMHG (ref 35–48)
PCO2 BLDA: 49 MMHG (ref 35–45)
PCO2 BLDV: 55 MMHG (ref 41–51)
PEEP RESPIRATORY: 5
PH BLD: 7.09 (ref 7.35–7.45)
PH BLD: 7.2 (ref 7.35–7.45)
PH BLD: 7.21 (ref 7.35–7.45)
PH BLD: 7.21 (ref 7.35–7.45)
PH BLD: 7.3 (ref 7.35–7.45)
PH BLD: 7.32 (ref 7.35–7.45)
PH BLD: 7.33 (ref 7.35–7.45)
PH BLD: 7.36 (ref 7.35–7.45)
PH BLD: 7.37 (ref 7.35–7.45)
PH BLD: 7.39 (ref 7.35–7.45)
PH BLDA: 7.26 (ref 7.35–7.45)
PH BLDV: 7.35 (ref 7.32–7.42)
PLATELET # BLD AUTO: 72 K/UL (ref 150–400)
PLATELET # BLD AUTO: 85 K/UL (ref 150–400)
PLATELET # BLD AUTO: 87 K/UL (ref 150–400)
PMV BLD AUTO: 10 FL (ref 8.9–12.9)
PMV BLD AUTO: 9.4 FL (ref 8.9–12.9)
PMV BLD AUTO: 9.5 FL (ref 8.9–12.9)
PO2 BLD: 240 MMHG (ref 83–108)
PO2 BLD: 255 MMHG (ref 83–108)
PO2 BLD: 265 MMHG (ref 83–108)
PO2 BLD: 278 MMHG (ref 83–108)
PO2 BLD: 296 MMHG (ref 83–108)
PO2 BLD: 377 MMHG (ref 83–108)
PO2 BLD: 416 MMHG (ref 83–108)
PO2 BLD: 424 MMHG (ref 83–108)
PO2 BLD: 467 MMHG (ref 83–108)
PO2 BLD: 494 MMHG (ref 83–108)
PO2 BLDA: 88 MMHG (ref 80–100)
PO2 BLDV: 52 MMHG (ref 25–40)
POTASSIUM BLD-SCNC: 3.1 MMOL/L (ref 3.5–5.5)
POTASSIUM BLD-SCNC: 3.5 MMOL/L (ref 3.5–5.5)
POTASSIUM BLD-SCNC: 4 MMOL/L (ref 3.5–5.5)
POTASSIUM BLD-SCNC: 4.2 MMOL/L (ref 3.5–5.5)
POTASSIUM BLD-SCNC: 4.4 MMOL/L (ref 3.5–5.5)
POTASSIUM BLD-SCNC: 4.4 MMOL/L (ref 3.5–5.5)
POTASSIUM BLD-SCNC: 4.7 MMOL/L (ref 3.5–5.5)
POTASSIUM BLD-SCNC: 5.5 MMOL/L (ref 3.5–5.5)
POTASSIUM BLD-SCNC: 5.5 MMOL/L (ref 3.5–5.5)
POTASSIUM BLD-SCNC: 5.6 MMOL/L (ref 3.5–5.5)
POTASSIUM BLD-SCNC: 5.6 MMOL/L (ref 3.5–5.5)
POTASSIUM SERPL-SCNC: 4.4 MMOL/L (ref 3.5–5.1)
POTASSIUM SERPL-SCNC: 5 MMOL/L (ref 3.5–5.1)
PRESSURE SUPPORT SETTING VENT: 5
PROT SERPL-MCNC: 4.9 G/DL (ref 6.4–8.2)
PROT SERPL-MCNC: 5 G/DL (ref 6.4–8.2)
PROTHROMBIN TIME: 14 SEC (ref 9.2–11.2)
PROTHROMBIN TIME: 14.9 SEC (ref 9.2–11.2)
RBC # BLD AUTO: 2.8 M/UL (ref 4.1–5.7)
RBC # BLD AUTO: 3.2 M/UL (ref 4.1–5.7)
RBC # BLD AUTO: 3.49 M/UL (ref 4.1–5.7)
SAO2 % BLD: 100 % (ref 94–98)
SAO2 % BLD: 84 % (ref 94–98)
SAO2 % BLD: 95 % (ref 92–97)
SAO2% DEVICE SAO2% SENSOR NAME: ABNORMAL
SERVICE CMNT-IMP: ABNORMAL
SERVICE CMNT-IMP: NORMAL
SODIUM BLD-SCNC: 138 MMOL/L (ref 136–145)
SODIUM BLD-SCNC: 139 MMOL/L (ref 136–145)
SODIUM BLD-SCNC: 140 MMOL/L (ref 136–145)
SODIUM BLD-SCNC: 141 MMOL/L (ref 136–145)
SODIUM BLD-SCNC: 142 MMOL/L (ref 136–145)
SODIUM BLD-SCNC: 143 MMOL/L (ref 136–145)
SODIUM BLD-SCNC: 147 MMOL/L (ref 136–145)
SODIUM BLD-SCNC: 149 MMOL/L (ref 136–145)
SODIUM BLD-SCNC: 149 MMOL/L (ref 136–145)
SODIUM SERPL-SCNC: 140 MMOL/L (ref 136–145)
SODIUM SERPL-SCNC: 143 MMOL/L (ref 136–145)
SPECIMEN SITE: ABNORMAL
THERAPEUTIC RANGE: ABNORMAL SECS (ref 58–77)
WBC # BLD AUTO: 12.3 K/UL (ref 4.1–11.1)
WBC # BLD AUTO: 17.9 K/UL (ref 4.1–11.1)
WBC # BLD AUTO: 18.2 K/UL (ref 4.1–11.1)

## 2025-06-04 PROCEDURE — 71045 X-RAY EXAM CHEST 1 VIEW: CPT

## 2025-06-04 PROCEDURE — 82330 ASSAY OF CALCIUM: CPT

## 2025-06-04 PROCEDURE — 85730 THROMBOPLASTIN TIME PARTIAL: CPT

## 2025-06-04 PROCEDURE — 5A1221Z PERFORMANCE OF CARDIAC OUTPUT, CONTINUOUS: ICD-10-PCS | Performed by: THORACIC SURGERY (CARDIOTHORACIC VASCULAR SURGERY)

## 2025-06-04 PROCEDURE — C1713 ANCHOR/SCREW BN/BN,TIS/BN: HCPCS | Performed by: THORACIC SURGERY (CARDIOTHORACIC VASCULAR SURGERY)

## 2025-06-04 PROCEDURE — 88304 TISSUE EXAM BY PATHOLOGIST: CPT

## 2025-06-04 PROCEDURE — 5A0935A ASSISTANCE WITH RESPIRATORY VENTILATION, LESS THAN 24 CONSECUTIVE HOURS, HIGH NASAL FLOW/VELOCITY: ICD-10-PCS | Performed by: THORACIC SURGERY (CARDIOTHORACIC VASCULAR SURGERY)

## 2025-06-04 PROCEDURE — 2580000003 HC RX 258: Performed by: ANESTHESIOLOGY

## 2025-06-04 PROCEDURE — C1729 CATH, DRAINAGE: HCPCS | Performed by: THORACIC SURGERY (CARDIOTHORACIC VASCULAR SURGERY)

## 2025-06-04 PROCEDURE — 6370000000 HC RX 637 (ALT 250 FOR IP): Performed by: THORACIC SURGERY (CARDIOTHORACIC VASCULAR SURGERY)

## 2025-06-04 PROCEDURE — 2720000010 HC SURG SUPPLY STERILE: Performed by: THORACIC SURGERY (CARDIOTHORACIC VASCULAR SURGERY)

## 2025-06-04 PROCEDURE — P9045 ALBUMIN (HUMAN), 5%, 250 ML: HCPCS | Performed by: PHYSICIAN ASSISTANT

## 2025-06-04 PROCEDURE — C1768 GRAFT, VASCULAR: HCPCS | Performed by: THORACIC SURGERY (CARDIOTHORACIC VASCULAR SURGERY)

## 2025-06-04 PROCEDURE — 6370000000 HC RX 637 (ALT 250 FOR IP): Performed by: INTERNAL MEDICINE

## 2025-06-04 PROCEDURE — 3700000000 HC ANESTHESIA ATTENDED CARE: Performed by: THORACIC SURGERY (CARDIOTHORACIC VASCULAR SURGERY)

## 2025-06-04 PROCEDURE — 6360000002 HC RX W HCPCS: Performed by: THORACIC SURGERY (CARDIOTHORACIC VASCULAR SURGERY)

## 2025-06-04 PROCEDURE — 32551 INSERTION OF CHEST TUBE: CPT

## 2025-06-04 PROCEDURE — 37799 UNLISTED PX VASCULAR SURGERY: CPT

## 2025-06-04 PROCEDURE — 6370000000 HC RX 637 (ALT 250 FOR IP): Performed by: PHYSICIAN ASSISTANT

## 2025-06-04 PROCEDURE — 82803 BLOOD GASES ANY COMBINATION: CPT

## 2025-06-04 PROCEDURE — 2709999900 HC NON-CHARGEABLE SUPPLY: Performed by: THORACIC SURGERY (CARDIOTHORACIC VASCULAR SURGERY)

## 2025-06-04 PROCEDURE — 2500000003 HC RX 250 WO HCPCS: Performed by: THORACIC SURGERY (CARDIOTHORACIC VASCULAR SURGERY)

## 2025-06-04 PROCEDURE — 88305 TISSUE EXAM BY PATHOLOGIST: CPT

## 2025-06-04 PROCEDURE — P9073 PLATELETS PHERESIS PATH REDU: HCPCS

## 2025-06-04 PROCEDURE — 2580000003 HC RX 258

## 2025-06-04 PROCEDURE — P9045 ALBUMIN (HUMAN), 5%, 250 ML: HCPCS

## 2025-06-04 PROCEDURE — 82947 ASSAY GLUCOSE BLOOD QUANT: CPT

## 2025-06-04 PROCEDURE — 6370000000 HC RX 637 (ALT 250 FOR IP)

## 2025-06-04 PROCEDURE — 85025 COMPLETE CBC W/AUTO DIFF WBC: CPT

## 2025-06-04 PROCEDURE — 94640 AIRWAY INHALATION TREATMENT: CPT

## 2025-06-04 PROCEDURE — 02HV33Z INSERTION OF INFUSION DEVICE INTO SUPERIOR VENA CAVA, PERCUTANEOUS APPROACH: ICD-10-PCS | Performed by: THORACIC SURGERY (CARDIOTHORACIC VASCULAR SURGERY)

## 2025-06-04 PROCEDURE — 94660 CPAP INITIATION&MGMT: CPT

## 2025-06-04 PROCEDURE — NSP99 NSP99 NON-BILLABLE CODE: Performed by: PHYSICIAN ASSISTANT

## 2025-06-04 PROCEDURE — 6360000002 HC RX W HCPCS: Performed by: PHYSICIAN ASSISTANT

## 2025-06-04 PROCEDURE — 84295 ASSAY OF SERUM SODIUM: CPT

## 2025-06-04 PROCEDURE — C1751 CATH, INF, PER/CENT/MIDLINE: HCPCS | Performed by: THORACIC SURGERY (CARDIOTHORACIC VASCULAR SURGERY)

## 2025-06-04 PROCEDURE — 84132 ASSAY OF SERUM POTASSIUM: CPT

## 2025-06-04 PROCEDURE — 6360000002 HC RX W HCPCS

## 2025-06-04 PROCEDURE — 2580000003 HC RX 258: Performed by: PHYSICIAN ASSISTANT

## 2025-06-04 PROCEDURE — 2500000003 HC RX 250 WO HCPCS

## 2025-06-04 PROCEDURE — 85610 PROTHROMBIN TIME: CPT

## 2025-06-04 PROCEDURE — 3600000018 HC SURGERY OHS ADDTL 15MIN: Performed by: THORACIC SURGERY (CARDIOTHORACIC VASCULAR SURGERY)

## 2025-06-04 PROCEDURE — B244ZZ4 ULTRASONOGRAPHY OF RIGHT HEART, TRANSESOPHAGEAL: ICD-10-PCS | Performed by: THORACIC SURGERY (CARDIOTHORACIC VASCULAR SURGERY)

## 2025-06-04 PROCEDURE — 2500000003 HC RX 250 WO HCPCS: Performed by: PHYSICIAN ASSISTANT

## 2025-06-04 PROCEDURE — 85384 FIBRINOGEN ACTIVITY: CPT

## 2025-06-04 PROCEDURE — 02L70CK OCCLUSION OF LEFT ATRIAL APPENDAGE WITH EXTRALUMINAL DEVICE, OPEN APPROACH: ICD-10-PCS | Performed by: THORACIC SURGERY (CARDIOTHORACIC VASCULAR SURGERY)

## 2025-06-04 PROCEDURE — 80053 COMPREHEN METABOLIC PANEL: CPT

## 2025-06-04 PROCEDURE — 93503 INSERT/PLACE HEART CATHETER: CPT

## 2025-06-04 PROCEDURE — 82962 GLUCOSE BLOOD TEST: CPT

## 2025-06-04 PROCEDURE — 2100000001 HC CVICU R&B

## 2025-06-04 PROCEDURE — 3600000008 HC SURGERY OHS BASE: Performed by: THORACIC SURGERY (CARDIOTHORACIC VASCULAR SURGERY)

## 2025-06-04 PROCEDURE — 5A09357 ASSISTANCE WITH RESPIRATORY VENTILATION, LESS THAN 24 CONSECUTIVE HOURS, CONTINUOUS POSITIVE AIRWAY PRESSURE: ICD-10-PCS | Performed by: THORACIC SURGERY (CARDIOTHORACIC VASCULAR SURGERY)

## 2025-06-04 PROCEDURE — 94002 VENT MGMT INPAT INIT DAY: CPT

## 2025-06-04 PROCEDURE — 36415 COLL VENOUS BLD VENIPUNCTURE: CPT

## 2025-06-04 PROCEDURE — 36556 INSERT NON-TUNNEL CV CATH: CPT

## 2025-06-04 PROCEDURE — 85018 HEMOGLOBIN: CPT

## 2025-06-04 PROCEDURE — 36430 TRANSFUSION BLD/BLD COMPNT: CPT

## 2025-06-04 PROCEDURE — 85027 COMPLETE CBC AUTOMATED: CPT

## 2025-06-04 PROCEDURE — 83735 ASSAY OF MAGNESIUM: CPT

## 2025-06-04 PROCEDURE — 36620 INSERTION CATHETER ARTERY: CPT

## 2025-06-04 PROCEDURE — C1889 IMPLANT/INSERT DEVICE, NOC: HCPCS | Performed by: THORACIC SURGERY (CARDIOTHORACIC VASCULAR SURGERY)

## 2025-06-04 PROCEDURE — 93005 ELECTROCARDIOGRAM TRACING: CPT | Performed by: PHYSICIAN ASSISTANT

## 2025-06-04 PROCEDURE — P9059 PLASMA, FRZ BETWEEN 8-24HOUR: HCPCS

## 2025-06-04 PROCEDURE — 3700000001 HC ADD 15 MINUTES (ANESTHESIA): Performed by: THORACIC SURGERY (CARDIOTHORACIC VASCULAR SURGERY)

## 2025-06-04 PROCEDURE — 02RF08Z REPLACEMENT OF AORTIC VALVE WITH ZOOPLASTIC TISSUE, OPEN APPROACH: ICD-10-PCS | Performed by: THORACIC SURGERY (CARDIOTHORACIC VASCULAR SURGERY)

## 2025-06-04 DEVICE — VALVE AORT 25MM REPL RESILIENT BOV PERICARD CO CHROMIUM ALLY: Type: IMPLANTABLE DEVICE | Site: HEART | Status: FUNCTIONAL

## 2025-06-04 DEVICE — CLIP MED SUTURE LESS 50 MM SYS 1 HND STRL ATRICLIP FLX V: Type: IMPLANTABLE DEVICE | Site: HEART | Status: FUNCTIONAL

## 2025-06-04 DEVICE — HEMASHIELD PLATINUM WOVEN STRAIGHT DOUBLE VELOUR VASCULAR GRAFT WITH GRAFT SIZER ACCESSORY
Type: IMPLANTABLE DEVICE | Site: AORTA | Status: FUNCTIONAL
Brand: HEMASHIELD

## 2025-06-04 DEVICE — GLUE TISS 10ML PLAS STD SPRD TIP SYR PLUNG PREFIL SKIN CLSR 5PK: Type: IMPLANTABLE DEVICE | Site: HEART | Status: FUNCTIONAL

## 2025-06-04 RX ORDER — GABAPENTIN 100 MG/1
200 CAPSULE ORAL 3 TIMES DAILY
Status: DISCONTINUED | OUTPATIENT
Start: 2025-06-04 | End: 2025-06-10 | Stop reason: HOSPADM

## 2025-06-04 RX ORDER — CALCIUM CHLORIDE 100 MG/ML
INJECTION INTRAVENOUS; INTRAVENTRICULAR
Status: DISCONTINUED | OUTPATIENT
Start: 2025-06-04 | End: 2025-06-04 | Stop reason: SDUPTHER

## 2025-06-04 RX ORDER — POLYETHYLENE GLYCOL 3350 17 G/17G
17 POWDER, FOR SOLUTION ORAL DAILY
Status: DISCONTINUED | OUTPATIENT
Start: 2025-06-04 | End: 2025-06-10 | Stop reason: HOSPADM

## 2025-06-04 RX ORDER — DOBUTAMINE HYDROCHLORIDE 200 MG/100ML
2.5-1 INJECTION INTRAVENOUS CONTINUOUS
Status: DISCONTINUED | OUTPATIENT
Start: 2025-06-04 | End: 2025-06-06

## 2025-06-04 RX ORDER — PANTOPRAZOLE SODIUM 40 MG/1
40 TABLET, DELAYED RELEASE ORAL
Status: DISCONTINUED | OUTPATIENT
Start: 2025-06-05 | End: 2025-06-10 | Stop reason: HOSPADM

## 2025-06-04 RX ORDER — SODIUM CHLORIDE 9 MG/ML
INJECTION, SOLUTION INTRAVENOUS PRN
Status: DISCONTINUED | OUTPATIENT
Start: 2025-06-04 | End: 2025-06-05

## 2025-06-04 RX ORDER — ATORVASTATIN CALCIUM 40 MG/1
40 TABLET, FILM COATED ORAL NIGHTLY
Status: DISCONTINUED | OUTPATIENT
Start: 2025-06-04 | End: 2025-06-10 | Stop reason: HOSPADM

## 2025-06-04 RX ORDER — PHENYLEPHRINE HYDROCHLORIDE 10 MG/ML
INJECTION INTRAVENOUS
Status: DISCONTINUED | OUTPATIENT
Start: 2025-06-04 | End: 2025-06-04 | Stop reason: SDUPTHER

## 2025-06-04 RX ORDER — ALBUTEROL SULFATE 90 UG/1
2 INHALANT RESPIRATORY (INHALATION) EVERY 4 HOURS PRN
Status: DISCONTINUED | OUTPATIENT
Start: 2025-06-04 | End: 2025-06-10 | Stop reason: HOSPADM

## 2025-06-04 RX ORDER — VASOPRESSIN 20 [USP'U]/ML
INJECTION, SOLUTION INTRAVENOUS
Status: DISCONTINUED | OUTPATIENT
Start: 2025-06-04 | End: 2025-06-04 | Stop reason: SDUPTHER

## 2025-06-04 RX ORDER — HEPARIN SODIUM 1000 [USP'U]/ML
INJECTION, SOLUTION INTRAVENOUS; SUBCUTANEOUS PRN
Status: DISCONTINUED | OUTPATIENT
Start: 2025-06-04 | End: 2025-06-04 | Stop reason: HOSPADM

## 2025-06-04 RX ORDER — IPRATROPIUM BROMIDE AND ALBUTEROL SULFATE 2.5; .5 MG/3ML; MG/3ML
1 SOLUTION RESPIRATORY (INHALATION) EVERY 4 HOURS PRN
Status: DISCONTINUED | OUTPATIENT
Start: 2025-06-04 | End: 2025-06-10 | Stop reason: HOSPADM

## 2025-06-04 RX ORDER — ACETAMINOPHEN 500 MG
1000 TABLET ORAL ONCE
Status: COMPLETED | OUTPATIENT
Start: 2025-06-04 | End: 2025-06-04

## 2025-06-04 RX ORDER — OXYCODONE HYDROCHLORIDE 5 MG/1
5 TABLET ORAL EVERY 4 HOURS PRN
Status: DISCONTINUED | OUTPATIENT
Start: 2025-06-04 | End: 2025-06-10 | Stop reason: HOSPADM

## 2025-06-04 RX ORDER — BUPIVACAINE HYDROCHLORIDE 5 MG/ML
INJECTION, SOLUTION EPIDURAL; INTRACAUDAL; PERINEURAL PRN
Status: DISCONTINUED | OUTPATIENT
Start: 2025-06-04 | End: 2025-06-04 | Stop reason: ALTCHOICE

## 2025-06-04 RX ORDER — ONDANSETRON 2 MG/ML
4 INJECTION INTRAMUSCULAR; INTRAVENOUS EVERY 4 HOURS PRN
Status: DISCONTINUED | OUTPATIENT
Start: 2025-06-04 | End: 2025-06-10 | Stop reason: HOSPADM

## 2025-06-04 RX ORDER — POTASSIUM CHLORIDE 29.8 MG/ML
20 INJECTION INTRAVENOUS PRN
Status: DISCONTINUED | OUTPATIENT
Start: 2025-06-04 | End: 2025-06-09

## 2025-06-04 RX ORDER — ACETAMINOPHEN 500 MG
1000 TABLET ORAL EVERY 6 HOURS
Status: DISCONTINUED | OUTPATIENT
Start: 2025-06-04 | End: 2025-06-10 | Stop reason: HOSPADM

## 2025-06-04 RX ORDER — DEXAMETHASONE SODIUM PHOSPHATE 4 MG/ML
INJECTION, SOLUTION INTRA-ARTICULAR; INTRALESIONAL; INTRAMUSCULAR; INTRAVENOUS; SOFT TISSUE
Status: DISCONTINUED | OUTPATIENT
Start: 2025-06-04 | End: 2025-06-04 | Stop reason: SDUPTHER

## 2025-06-04 RX ORDER — INSULIN LISPRO 100 [IU]/ML
0-12 INJECTION, SOLUTION INTRAVENOUS; SUBCUTANEOUS
Status: DISCONTINUED | OUTPATIENT
Start: 2025-06-04 | End: 2025-06-10 | Stop reason: HOSPADM

## 2025-06-04 RX ORDER — DEXMEDETOMIDINE HYDROCHLORIDE 4 UG/ML
.1-1.5 INJECTION, SOLUTION INTRAVENOUS CONTINUOUS
Status: DISCONTINUED | OUTPATIENT
Start: 2025-06-04 | End: 2025-06-05

## 2025-06-04 RX ORDER — MAGNESIUM SULFATE HEPTAHYDRATE 40 MG/ML
INJECTION, SOLUTION INTRAVENOUS
Status: DISCONTINUED | OUTPATIENT
Start: 2025-06-04 | End: 2025-06-04 | Stop reason: SDUPTHER

## 2025-06-04 RX ORDER — CEFAZOLIN SODIUM 1 G/3ML
INJECTION, POWDER, FOR SOLUTION INTRAMUSCULAR; INTRAVENOUS
Status: DISCONTINUED | OUTPATIENT
Start: 2025-06-04 | End: 2025-06-04

## 2025-06-04 RX ORDER — FENTANYL CITRATE 50 UG/ML
INJECTION, SOLUTION INTRAMUSCULAR; INTRAVENOUS
Status: DISCONTINUED | OUTPATIENT
Start: 2025-06-04 | End: 2025-06-04 | Stop reason: SDUPTHER

## 2025-06-04 RX ORDER — HYDRALAZINE HYDROCHLORIDE 20 MG/ML
10 INJECTION INTRAMUSCULAR; INTRAVENOUS EVERY 6 HOURS PRN
Status: DISCONTINUED | OUTPATIENT
Start: 2025-06-04 | End: 2025-06-10 | Stop reason: HOSPADM

## 2025-06-04 RX ORDER — ONDANSETRON 2 MG/ML
INJECTION INTRAMUSCULAR; INTRAVENOUS
Status: DISCONTINUED | OUTPATIENT
Start: 2025-06-04 | End: 2025-06-04 | Stop reason: SDUPTHER

## 2025-06-04 RX ORDER — NOREPINEPHRINE BITARTRATE 0.06 MG/ML
1-100 INJECTION, SOLUTION INTRAVENOUS CONTINUOUS
Status: DISCONTINUED | OUTPATIENT
Start: 2025-06-04 | End: 2025-06-06

## 2025-06-04 RX ORDER — MUPIROCIN 2 %
OINTMENT (GRAM) TOPICAL 2 TIMES DAILY
Status: COMPLETED | OUTPATIENT
Start: 2025-06-04 | End: 2025-06-09

## 2025-06-04 RX ORDER — BISACODYL 10 MG
10 SUPPOSITORY, RECTAL RECTAL DAILY PRN
Status: DISCONTINUED | OUTPATIENT
Start: 2025-06-04 | End: 2025-06-10 | Stop reason: HOSPADM

## 2025-06-04 RX ORDER — EPHEDRINE SULFATE/0.9% NACL/PF 50 MG/5 ML
SYRINGE (ML) INTRAVENOUS
Status: DISCONTINUED | OUTPATIENT
Start: 2025-06-04 | End: 2025-06-04 | Stop reason: SDUPTHER

## 2025-06-04 RX ORDER — SENNA AND DOCUSATE SODIUM 50; 8.6 MG/1; MG/1
1 TABLET, FILM COATED ORAL 2 TIMES DAILY
Status: DISCONTINUED | OUTPATIENT
Start: 2025-06-04 | End: 2025-06-10 | Stop reason: HOSPADM

## 2025-06-04 RX ORDER — HEPARIN SODIUM 1000 [USP'U]/ML
INJECTION, SOLUTION INTRAVENOUS; SUBCUTANEOUS
Status: DISCONTINUED | OUTPATIENT
Start: 2025-06-04 | End: 2025-06-04

## 2025-06-04 RX ORDER — MIDAZOLAM HYDROCHLORIDE 1 MG/ML
INJECTION, SOLUTION INTRAMUSCULAR; INTRAVENOUS
Status: DISCONTINUED | OUTPATIENT
Start: 2025-06-04 | End: 2025-06-04 | Stop reason: SDUPTHER

## 2025-06-04 RX ORDER — SODIUM CHLORIDE 9 MG/ML
INJECTION, SOLUTION INTRAVENOUS PRN
Status: DISCONTINUED | OUTPATIENT
Start: 2025-06-04 | End: 2025-06-10 | Stop reason: HOSPADM

## 2025-06-04 RX ORDER — SODIUM CHLORIDE 0.9 % (FLUSH) 0.9 %
5-40 SYRINGE (ML) INJECTION EVERY 12 HOURS SCHEDULED
Status: DISCONTINUED | OUTPATIENT
Start: 2025-06-04 | End: 2025-06-04 | Stop reason: HOSPADM

## 2025-06-04 RX ORDER — INDOMETHACIN 25 MG/1
CAPSULE ORAL
Status: DISCONTINUED | OUTPATIENT
Start: 2025-06-04 | End: 2025-06-04 | Stop reason: SDUPTHER

## 2025-06-04 RX ORDER — ARFORMOTEROL TARTRATE 15 UG/2ML
15 SOLUTION RESPIRATORY (INHALATION)
Status: DISCONTINUED | OUTPATIENT
Start: 2025-06-04 | End: 2025-06-06

## 2025-06-04 RX ORDER — SODIUM CHLORIDE 9 MG/ML
INJECTION, SOLUTION INTRAVENOUS
Status: DISCONTINUED | OUTPATIENT
Start: 2025-06-04 | End: 2025-06-04 | Stop reason: SDUPTHER

## 2025-06-04 RX ORDER — CHLORHEXIDINE GLUCONATE ORAL RINSE 1.2 MG/ML
15 SOLUTION DENTAL 2 TIMES DAILY
Status: DISCONTINUED | OUTPATIENT
Start: 2025-06-04 | End: 2025-06-10 | Stop reason: HOSPADM

## 2025-06-04 RX ORDER — OXYCODONE HYDROCHLORIDE 5 MG/1
10 TABLET ORAL EVERY 4 HOURS PRN
Status: DISCONTINUED | OUTPATIENT
Start: 2025-06-04 | End: 2025-06-10 | Stop reason: HOSPADM

## 2025-06-04 RX ORDER — DOBUTAMINE HYDROCHLORIDE 200 MG/100ML
INJECTION INTRAVENOUS
Status: DISCONTINUED | OUTPATIENT
Start: 2025-06-04 | End: 2025-06-04 | Stop reason: SDUPTHER

## 2025-06-04 RX ORDER — SODIUM CHLORIDE 0.9 % (FLUSH) 0.9 %
5-40 SYRINGE (ML) INJECTION EVERY 12 HOURS SCHEDULED
Status: DISCONTINUED | OUTPATIENT
Start: 2025-06-04 | End: 2025-06-10 | Stop reason: HOSPADM

## 2025-06-04 RX ORDER — CEFAZOLIN SODIUM 1 G/3ML
INJECTION, POWDER, FOR SOLUTION INTRAMUSCULAR; INTRAVENOUS PRN
Status: DISCONTINUED | OUTPATIENT
Start: 2025-06-04 | End: 2025-06-04 | Stop reason: ALTCHOICE

## 2025-06-04 RX ORDER — SODIUM CHLORIDE, SODIUM LACTATE, POTASSIUM CHLORIDE, CALCIUM CHLORIDE 600; 310; 30; 20 MG/100ML; MG/100ML; MG/100ML; MG/100ML
INJECTION, SOLUTION INTRAVENOUS CONTINUOUS
Status: DISCONTINUED | OUTPATIENT
Start: 2025-06-04 | End: 2025-06-04 | Stop reason: HOSPADM

## 2025-06-04 RX ORDER — INSULIN LISPRO 100 [IU]/ML
0-6 INJECTION, SOLUTION INTRAVENOUS; SUBCUTANEOUS NIGHTLY
Status: DISCONTINUED | OUTPATIENT
Start: 2025-06-04 | End: 2025-06-10 | Stop reason: HOSPADM

## 2025-06-04 RX ORDER — MIDAZOLAM HYDROCHLORIDE 2 MG/2ML
1 INJECTION, SOLUTION INTRAMUSCULAR; INTRAVENOUS
Status: DISCONTINUED | OUTPATIENT
Start: 2025-06-04 | End: 2025-06-09

## 2025-06-04 RX ORDER — NOREPINEPHRINE BIT/0.9 % NACL 16MG/250ML
INFUSION BOTTLE (ML) INTRAVENOUS
Status: DISCONTINUED | OUTPATIENT
Start: 2025-06-04 | End: 2025-06-04 | Stop reason: SDUPTHER

## 2025-06-04 RX ORDER — LANOLIN ALCOHOL/MO/W.PET/CERES
400 CREAM (GRAM) TOPICAL 2 TIMES DAILY
Status: DISCONTINUED | OUTPATIENT
Start: 2025-06-05 | End: 2025-06-10 | Stop reason: HOSPADM

## 2025-06-04 RX ORDER — SODIUM CHLORIDE 9 MG/ML
INJECTION, SOLUTION INTRAVENOUS CONTINUOUS
Status: DISCONTINUED | OUTPATIENT
Start: 2025-06-04 | End: 2025-06-06

## 2025-06-04 RX ORDER — ASPIRIN 81 MG/1
81 TABLET, CHEWABLE ORAL DAILY
Status: DISCONTINUED | OUTPATIENT
Start: 2025-06-04 | End: 2025-06-10 | Stop reason: HOSPADM

## 2025-06-04 RX ORDER — ALBUMIN HUMAN 50 G/1000ML
12.5 SOLUTION INTRAVENOUS PRN
Status: COMPLETED | OUTPATIENT
Start: 2025-06-04 | End: 2025-06-05

## 2025-06-04 RX ORDER — LIDOCAINE 4 G/G
2 PATCH TOPICAL DAILY
Status: DISCONTINUED | OUTPATIENT
Start: 2025-06-04 | End: 2025-06-10 | Stop reason: HOSPADM

## 2025-06-04 RX ORDER — SODIUM CHLORIDE 450 MG/100ML
INJECTION, SOLUTION INTRAVENOUS CONTINUOUS
Status: DISCONTINUED | OUTPATIENT
Start: 2025-06-04 | End: 2025-06-06

## 2025-06-04 RX ORDER — ALBUMIN HUMAN 50 G/1000ML
SOLUTION INTRAVENOUS
Status: DISCONTINUED | OUTPATIENT
Start: 2025-06-04 | End: 2025-06-04 | Stop reason: SDUPTHER

## 2025-06-04 RX ORDER — DESMOPRESSIN ACETATE 4 UG/ML
INJECTION, SOLUTION INTRAVENOUS; SUBCUTANEOUS
Status: DISCONTINUED | OUTPATIENT
Start: 2025-06-04 | End: 2025-06-04 | Stop reason: SDUPTHER

## 2025-06-04 RX ORDER — SODIUM CHLORIDE 0.9 % (FLUSH) 0.9 %
5-40 SYRINGE (ML) INJECTION PRN
Status: DISCONTINUED | OUTPATIENT
Start: 2025-06-04 | End: 2025-06-04 | Stop reason: HOSPADM

## 2025-06-04 RX ORDER — PROPOFOL 10 MG/ML
INJECTION, EMULSION INTRAVENOUS
Status: DISCONTINUED | OUTPATIENT
Start: 2025-06-04 | End: 2025-06-04 | Stop reason: SDUPTHER

## 2025-06-04 RX ORDER — DIPHENHYDRAMINE HCL 25 MG
25 CAPSULE ORAL NIGHTLY PRN
Status: DISCONTINUED | OUTPATIENT
Start: 2025-06-05 | End: 2025-06-10 | Stop reason: HOSPADM

## 2025-06-04 RX ORDER — GLUCAGON 1 MG/ML
1 KIT INJECTION PRN
Status: DISCONTINUED | OUTPATIENT
Start: 2025-06-04 | End: 2025-06-10 | Stop reason: HOSPADM

## 2025-06-04 RX ORDER — DEXTROSE MONOHYDRATE 100 MG/ML
INJECTION, SOLUTION INTRAVENOUS CONTINUOUS PRN
Status: DISCONTINUED | OUTPATIENT
Start: 2025-06-04 | End: 2025-06-10 | Stop reason: HOSPADM

## 2025-06-04 RX ORDER — GABAPENTIN 300 MG/1
300 CAPSULE ORAL ONCE
Status: COMPLETED | OUTPATIENT
Start: 2025-06-04 | End: 2025-06-04

## 2025-06-04 RX ORDER — SODIUM CHLORIDE 0.9 % (FLUSH) 0.9 %
5-40 SYRINGE (ML) INJECTION PRN
Status: DISCONTINUED | OUTPATIENT
Start: 2025-06-04 | End: 2025-06-10 | Stop reason: HOSPADM

## 2025-06-04 RX ORDER — ONDANSETRON 2 MG/ML
4 INJECTION INTRAMUSCULAR; INTRAVENOUS ONCE
Status: COMPLETED | OUTPATIENT
Start: 2025-06-04 | End: 2025-06-04

## 2025-06-04 RX ORDER — BUDESONIDE 0.25 MG/2ML
0.25 INHALANT ORAL
Status: DISCONTINUED | OUTPATIENT
Start: 2025-06-04 | End: 2025-06-06

## 2025-06-04 RX ORDER — MAGNESIUM SULFATE IN WATER 40 MG/ML
2000 INJECTION, SOLUTION INTRAVENOUS PRN
Status: DISCONTINUED | OUTPATIENT
Start: 2025-06-04 | End: 2025-06-09

## 2025-06-04 RX ORDER — AMIODARONE HYDROCHLORIDE 200 MG/1
400 TABLET ORAL 2 TIMES DAILY
Status: DISCONTINUED | OUTPATIENT
Start: 2025-06-05 | End: 2025-06-05

## 2025-06-04 RX ORDER — LIDOCAINE HYDROCHLORIDE 20 MG/ML
INJECTION, SOLUTION EPIDURAL; INFILTRATION; INTRACAUDAL; PERINEURAL
Status: DISCONTINUED | OUTPATIENT
Start: 2025-06-04 | End: 2025-06-04 | Stop reason: SDUPTHER

## 2025-06-04 RX ORDER — SODIUM CHLORIDE 9 MG/ML
INJECTION, SOLUTION INTRAVENOUS PRN
Status: DISCONTINUED | OUTPATIENT
Start: 2025-06-04 | End: 2025-06-04 | Stop reason: HOSPADM

## 2025-06-04 RX ORDER — ROCURONIUM BROMIDE 10 MG/ML
INJECTION, SOLUTION INTRAVENOUS
Status: DISCONTINUED | OUTPATIENT
Start: 2025-06-04 | End: 2025-06-04 | Stop reason: SDUPTHER

## 2025-06-04 RX ORDER — PROTAMINE SULFATE 10 MG/ML
INJECTION, SOLUTION INTRAVENOUS
Status: DISCONTINUED | OUTPATIENT
Start: 2025-06-04 | End: 2025-06-04 | Stop reason: SDUPTHER

## 2025-06-04 RX ORDER — IPRATROPIUM BROMIDE AND ALBUTEROL SULFATE 2.5; .5 MG/3ML; MG/3ML
1 SOLUTION RESPIRATORY (INHALATION) ONCE
Status: COMPLETED | OUTPATIENT
Start: 2025-06-04 | End: 2025-06-04

## 2025-06-04 RX ORDER — HEPARIN SODIUM 1000 [USP'U]/ML
INJECTION, SOLUTION INTRAVENOUS; SUBCUTANEOUS
Status: DISCONTINUED | OUTPATIENT
Start: 2025-06-04 | End: 2025-06-04 | Stop reason: SDUPTHER

## 2025-06-04 RX ADMIN — ONDANSETRON 4 MG: 2 INJECTION, SOLUTION INTRAMUSCULAR; INTRAVENOUS at 11:01

## 2025-06-04 RX ADMIN — ALBUMIN (HUMAN) 12.5 G: 12.5 INJECTION, SOLUTION INTRAVENOUS at 15:04

## 2025-06-04 RX ADMIN — FENTANYL CITRATE 50 MCG: 50 INJECTION INTRAMUSCULAR; INTRAVENOUS at 08:29

## 2025-06-04 RX ADMIN — IPRATROPIUM BROMIDE 0.5 MG: 0.5 SOLUTION RESPIRATORY (INHALATION) at 16:58

## 2025-06-04 RX ADMIN — DOCUSATE SODIUM 50MG AND SENNOSIDES 8.6MG 1 TABLET: 8.6; 5 TABLET, FILM COATED ORAL at 22:51

## 2025-06-04 RX ADMIN — SUGAMMADEX 400 MG: 100 INJECTION, SOLUTION INTRAVENOUS at 16:04

## 2025-06-04 RX ADMIN — VASOPRESSIN 1 UNITS: 20 INJECTION INTRAVENOUS at 12:03

## 2025-06-04 RX ADMIN — ACETAMINOPHEN 1000 MG: 500 TABLET ORAL at 06:48

## 2025-06-04 RX ADMIN — IPRATROPIUM BROMIDE 0.5 MG: 0.5 SOLUTION RESPIRATORY (INHALATION) at 19:52

## 2025-06-04 RX ADMIN — DEXAMETHASONE SODIUM PHOSPHATE 10 MG: 4 INJECTION, SOLUTION INTRA-ARTICULAR; INTRALESIONAL; INTRAMUSCULAR; INTRAVENOUS; SOFT TISSUE at 07:51

## 2025-06-04 RX ADMIN — SODIUM CHLORIDE 0.7 UNITS/HR: 9 INJECTION, SOLUTION INTRAVENOUS at 09:18

## 2025-06-04 RX ADMIN — SODIUM BICARBONATE 50 MEQ: 84 INJECTION, SOLUTION INTRAVENOUS at 12:52

## 2025-06-04 RX ADMIN — ALBUMIN (HUMAN) 12.5 G: 12.5 INJECTION, SOLUTION INTRAVENOUS at 14:15

## 2025-06-04 RX ADMIN — SODIUM BICARBONATE 50 MEQ: 84 INJECTION, SOLUTION INTRAVENOUS at 12:49

## 2025-06-04 RX ADMIN — DOBUTAMINE HYDROCHLORIDE 5 MCG/KG/MIN: 200 INJECTION INTRAVENOUS at 20:44

## 2025-06-04 RX ADMIN — MIDAZOLAM 1 MG: 1 INJECTION INTRAMUSCULAR; INTRAVENOUS at 07:02

## 2025-06-04 RX ADMIN — ALBUMIN (HUMAN) 12.5 G: 12.5 INJECTION, SOLUTION INTRAVENOUS at 13:50

## 2025-06-04 RX ADMIN — VASOPRESSIN 0.03 UNITS/MIN: 20 INJECTION INTRAVENOUS at 17:06

## 2025-06-04 RX ADMIN — VASOPRESSIN 1 UNITS: 20 INJECTION INTRAVENOUS at 12:45

## 2025-06-04 RX ADMIN — ROCURONIUM BROMIDE 100 MG: 10 INJECTION INTRAVENOUS at 09:17

## 2025-06-04 RX ADMIN — MIDAZOLAM 1 MG: 1 INJECTION INTRAMUSCULAR; INTRAVENOUS at 07:51

## 2025-06-04 RX ADMIN — MUPIROCIN: 20 OINTMENT TOPICAL at 22:57

## 2025-06-04 RX ADMIN — ASPIRIN 81 MG: 81 TABLET, CHEWABLE ORAL at 22:51

## 2025-06-04 RX ADMIN — BUDESONIDE 250 MCG: 0.25 INHALANT RESPIRATORY (INHALATION) at 19:52

## 2025-06-04 RX ADMIN — EPINEPHRINE 2 MCG/MIN: 1 INJECTION INTRAMUSCULAR; INTRAVENOUS; SUBCUTANEOUS at 11:19

## 2025-06-04 RX ADMIN — HYDROMORPHONE HYDROCHLORIDE 0.5 MG: 1 INJECTION, SOLUTION INTRAMUSCULAR; INTRAVENOUS; SUBCUTANEOUS at 16:07

## 2025-06-04 RX ADMIN — Medication 3 AMPULE: at 06:46

## 2025-06-04 RX ADMIN — PROPOFOL 100 MG: 10 INJECTION, EMULSION INTRAVENOUS at 07:51

## 2025-06-04 RX ADMIN — VASOPRESSIN 1 UNITS: 20 INJECTION INTRAVENOUS at 12:57

## 2025-06-04 RX ADMIN — MIDAZOLAM 1 MG: 1 INJECTION INTRAMUSCULAR; INTRAVENOUS at 07:15

## 2025-06-04 RX ADMIN — ROCURONIUM BROMIDE 50 MG: 10 INJECTION INTRAVENOUS at 15:06

## 2025-06-04 RX ADMIN — FAMOTIDINE 20 MG: 10 INJECTION, SOLUTION INTRAVENOUS at 20:12

## 2025-06-04 RX ADMIN — CALCIUM CHLORIDE 1000 MG: 100 INJECTION, SOLUTION INTRAVENOUS at 21:16

## 2025-06-04 RX ADMIN — MIDAZOLAM 1 MG: 1 INJECTION INTRAMUSCULAR; INTRAVENOUS at 07:18

## 2025-06-04 RX ADMIN — VASOPRESSIN 1 UNITS: 20 INJECTION INTRAVENOUS at 12:19

## 2025-06-04 RX ADMIN — MAGNESIUM SULFATE IN WATER 2000 MG: 40 INJECTION, SOLUTION INTRAVENOUS at 12:59

## 2025-06-04 RX ADMIN — PHENYLEPHRINE HYDROCHLORIDE 240 MCG: 10 INJECTION INTRAVENOUS at 07:51

## 2025-06-04 RX ADMIN — CALCIUM CHLORIDE 1 G: 100 INJECTION, SOLUTION INTRAVENOUS at 14:31

## 2025-06-04 RX ADMIN — FENTANYL CITRATE 100 MCG/HR: 50 INJECTION, SOLUTION INTRAMUSCULAR; INTRAVENOUS at 07:55

## 2025-06-04 RX ADMIN — HEPARIN SODIUM 43000 UNITS: 1000 INJECTION INTRAVENOUS; SUBCUTANEOUS at 09:08

## 2025-06-04 RX ADMIN — ALBUMIN (HUMAN) 12.5 G: 12.5 INJECTION, SOLUTION INTRAVENOUS at 12:20

## 2025-06-04 RX ADMIN — ALBUMIN (HUMAN) 12.5 G: 12.5 INJECTION, SOLUTION INTRAVENOUS at 12:51

## 2025-06-04 RX ADMIN — SODIUM CHLORIDE, SODIUM LACTATE, POTASSIUM CHLORIDE, AND CALCIUM CHLORIDE: .6; .31; .03; .02 INJECTION, SOLUTION INTRAVENOUS at 06:46

## 2025-06-04 RX ADMIN — TRANEXAMIC ACID 1000 MG: 100 INJECTION, SOLUTION INTRAVENOUS at 08:07

## 2025-06-04 RX ADMIN — VASOPRESSIN 1 UNITS: 20 INJECTION INTRAVENOUS at 12:07

## 2025-06-04 RX ADMIN — DOBUTAMINE HYDROCHLORIDE 5 MCG/KG/MIN: 200 INJECTION INTRAVENOUS at 11:39

## 2025-06-04 RX ADMIN — GABAPENTIN 300 MG: 300 CAPSULE ORAL at 06:48

## 2025-06-04 RX ADMIN — ROCURONIUM BROMIDE 100 MG: 10 INJECTION INTRAVENOUS at 07:51

## 2025-06-04 RX ADMIN — DESMOPRESSIN ACETATE 32 MCG: 4 INJECTION, SOLUTION INTRAVENOUS; SUBCUTANEOUS at 12:32

## 2025-06-04 RX ADMIN — ARFORMOTEROL TARTRATE 15 MCG: 15 SOLUTION RESPIRATORY (INHALATION) at 19:52

## 2025-06-04 RX ADMIN — GABAPENTIN 200 MG: 100 CAPSULE ORAL at 22:50

## 2025-06-04 RX ADMIN — VASOPRESSIN 1 UNITS: 20 INJECTION INTRAVENOUS at 13:09

## 2025-06-04 RX ADMIN — CALCIUM CHLORIDE 1 G: 100 INJECTION, SOLUTION INTRAVENOUS at 13:17

## 2025-06-04 RX ADMIN — Medication 2.5 MG: at 08:46

## 2025-06-04 RX ADMIN — FENTANYL CITRATE 100 MCG: 50 INJECTION, SOLUTION INTRAMUSCULAR; INTRAVENOUS at 08:51

## 2025-06-04 RX ADMIN — SODIUM CHLORIDE: 9 INJECTION, SOLUTION INTRAVENOUS at 07:43

## 2025-06-04 RX ADMIN — WATER 2000 MG: 1 INJECTION INTRAMUSCULAR; INTRAVENOUS; SUBCUTANEOUS at 08:12

## 2025-06-04 RX ADMIN — WATER 2000 MG: 1 INJECTION INTRAMUSCULAR; INTRAVENOUS; SUBCUTANEOUS at 12:06

## 2025-06-04 RX ADMIN — FENTANYL CITRATE 50 MCG: 50 INJECTION INTRAMUSCULAR; INTRAVENOUS at 08:20

## 2025-06-04 RX ADMIN — ALBUMIN (HUMAN) 12.5 G: 12.5 INJECTION, SOLUTION INTRAVENOUS at 23:43

## 2025-06-04 RX ADMIN — SODIUM BICARBONATE 50 MEQ: 84 INJECTION, SOLUTION INTRAVENOUS at 13:00

## 2025-06-04 RX ADMIN — VASOPRESSIN 1 UNITS: 20 INJECTION INTRAVENOUS at 12:31

## 2025-06-04 RX ADMIN — IPRATROPIUM BROMIDE AND ALBUTEROL SULFATE 1 DOSE: .5; 3 SOLUTION RESPIRATORY (INHALATION) at 17:00

## 2025-06-04 RX ADMIN — ROCURONIUM BROMIDE 50 MG: 10 INJECTION INTRAVENOUS at 12:44

## 2025-06-04 RX ADMIN — SODIUM CHLORIDE, PRESERVATIVE FREE 10 ML: 5 INJECTION INTRAVENOUS at 21:00

## 2025-06-04 RX ADMIN — VASOPRESSIN 1 UNITS: 20 INJECTION INTRAVENOUS at 12:37

## 2025-06-04 RX ADMIN — MIDAZOLAM 1 MG: 1 INJECTION INTRAMUSCULAR; INTRAVENOUS at 07:08

## 2025-06-04 RX ADMIN — Medication 5 MG: at 09:02

## 2025-06-04 RX ADMIN — VASOPRESSIN 0.04 UNITS/MIN: 20 INJECTION INTRAVENOUS at 12:40

## 2025-06-04 RX ADMIN — LIDOCAINE HYDROCHLORIDE 100 MG: 20 INJECTION, SOLUTION EPIDURAL; INFILTRATION; INTRACAUDAL; PERINEURAL at 07:51

## 2025-06-04 RX ADMIN — ONDANSETRON 4 MG: 2 INJECTION, SOLUTION INTRAMUSCULAR; INTRAVENOUS at 18:10

## 2025-06-04 RX ADMIN — DEXMEDETOMIDINE HYDROCHLORIDE 0.7 MCG/KG/HR: 400 INJECTION, SOLUTION INTRAVENOUS at 20:07

## 2025-06-04 RX ADMIN — FENTANYL CITRATE 50 MCG: 50 INJECTION INTRAMUSCULAR; INTRAVENOUS at 07:08

## 2025-06-04 RX ADMIN — Medication 5 MCG/MIN: at 11:21

## 2025-06-04 RX ADMIN — SODIUM CHLORIDE: 0.45 INJECTION, SOLUTION INTRAVENOUS at 20:23

## 2025-06-04 RX ADMIN — 0.12% CHLORHEXIDINE GLUCONATE 15 ML: 1.2 RINSE ORAL at 22:55

## 2025-06-04 RX ADMIN — PROTAMINE SULFATE 400 MG: 10 INJECTION, SOLUTION INTRAVENOUS at 12:09

## 2025-06-04 RX ADMIN — PHENYLEPHRINE HYDROCHLORIDE 80 MCG/MIN: 10 INJECTION INTRAVENOUS at 12:16

## 2025-06-04 RX ADMIN — ALBUMIN (HUMAN) 12.5 G: 12.5 INJECTION, SOLUTION INTRAVENOUS at 14:41

## 2025-06-04 RX ADMIN — ALBUMIN (HUMAN) 12.5 G: 12.5 INJECTION, SOLUTION INTRAVENOUS at 16:20

## 2025-06-04 RX ADMIN — DEXMEDETOMIDINE 0.3 MCG/KG/HR: 100 INJECTION, SOLUTION INTRAVENOUS at 07:55

## 2025-06-04 RX ADMIN — ATORVASTATIN CALCIUM 40 MG: 40 TABLET, FILM COATED ORAL at 22:50

## 2025-06-04 RX ADMIN — WATER 2000 MG: 1 INJECTION INTRAMUSCULAR; INTRAVENOUS; SUBCUTANEOUS at 20:17

## 2025-06-04 RX ADMIN — ACETAMINOPHEN 1000 MG: 500 TABLET, FILM COATED ORAL at 22:50

## 2025-06-04 RX ADMIN — Medication 2.5 MG: at 08:34

## 2025-06-04 RX ADMIN — FENTANYL CITRATE 100 MCG: 50 INJECTION INTRAMUSCULAR; INTRAVENOUS at 07:51

## 2025-06-04 ASSESSMENT — PAIN SCALES - GENERAL
PAINLEVEL_OUTOF10: 0
PAINLEVEL_OUTOF10: 0

## 2025-06-04 ASSESSMENT — COPD QUESTIONNAIRES: CAT_SEVERITY: MILD

## 2025-06-04 ASSESSMENT — PAIN - FUNCTIONAL ASSESSMENT: PAIN_FUNCTIONAL_ASSESSMENT: 0-10

## 2025-06-04 NOTE — ANESTHESIA PROCEDURE NOTES
Central Venous Line:    A central venous line was placed using ultrasound guidance, in the pre-op for the following indication(s): central venous access and CVP monitoring.6/4/2025 7:14 AM6/4/2025 7:31 AM    Sterility preparation included the following: provider used sterile gloves, gown, hat and mask, hand hygiene performed prior to central venous catheter insertion, sterile gel and probe cover used in ultrasound-guided central venous catheter insertion and maximum sterile barriers used during central venous catheter insertion.    The patient was placed in Trendelenburg position.The right internal jugular vein was prepped.    The site was prepped with Chloraprep.  A 9 Fr (size), 12 (length), introducer double lumen was placed.    During the procedure, the following specific steps were taken: target vein identified, needle advanced into vein and blood aspirated and guidewire advanced into vein.    Intravenous verification was obtained by ultrasound and venous blood return.    Post insertion care included: all ports aspirated, all ports flushed easily, guidewire removed intact, line sutured in place and dressing applied.    During the procedure the patient experienced: patient tolerated procedure well with no complications.      Outcomes: uncomplicated  Real-time US image taken/store: Yes  Anesthesia type: localA(n) oximetric, 8 (size) Pulmonary Artery Catheter (PAC) was placed through the Introducer CVL in the right internal jugular vein.  The PAC placement was confirmed by pressure tracing changes and LUPILLO.  The patient experienced the following events during the procedure: patient tolerated procedure well with no complications.PA Cath placed?: Yes  Staffing  Performed: Anesthesiologist   Performed by: Froilan Vargas MD  Authorized by: Froilan Vargas MD    Preanesthetic Checklist  Completed: patient identified, IV checked, site marked, risks and benefits discussed, surgical/procedural consents, equipment

## 2025-06-04 NOTE — PERIOP NOTE
0832   Anesthesia Pre Induction Assessment performed    YES  Anesthesia Safety Checklist performed  YES  Surgical Safety Checklist performed   YES  Surgeon Concerns     YES  Anesthesia Concerns     YES  Blood Products     YES  Allergies Reviewed     YES  Antibiotics Given     YES       Time - 0812  Betablocker Given     Not indicated        Date       Time  Special Equipment in room    YES  Cardioplegia      YES  LUPILLO Report      YES  Epi Aortic Ultrasound     NO  Use of [place] to LAD     NA  Glucose Monitoring     YES  Correct insulin Management    YES    1100    REWARMING INITIATED  CCU NOTIFIED - SPOKE WITH YOSH    TRANSFER - OUT REPORT:    1250    Verbal report given to CORNELIUS Bai RN on Jose Díaz III  being transferred to CCU for routine progression of patient care       Report consisted of patient's Situation, Background, Assessment and   Recommendations(SBAR).     Information from the following report(s) Surgery Report was reviewed with the receiving nurse.           Lines:       Opportunity for questions and clarification was provided.      Patient transported with:  Monitor and O2 @ 10 lpm via ET Tube And Ambu Bag Ventilations    1345   MONITORING PATIENT FOR BLEEDING     1440   MONITORING PATIENT FOR BLEEDING

## 2025-06-04 NOTE — INTERVAL H&P NOTE
Update History & Physical    The patient's History and Physical of May 29, 2025 was reviewed with the patient and I examined the patient. There was no change in the patient's condition since that time. The surgical site was confirmed by the patient and me.     Plan for AVR, ascending aortic replacement with possible root today with Dr. Soto.  All patient questions answered at this time.    Electronically signed by Dianelys Prado PA-C on 6/4/2025 at 7:24 AM

## 2025-06-04 NOTE — ANESTHESIA PROCEDURE NOTES
Procedure Performed: LUPILLO       Start Time:  6/4/2025 8:05 AM       End Time:   6/4/2025 3:33 PM    Anesthesia Information  Performed Personally        Preanesthesia Checklist:  Patient identified, IV assessed, risks and benefits discussed, monitors and equipment assessed, procedure being performed at surgeon's request and anesthesia consent obtained.    General Procedure Information  Diagnostic Indications for Echo:  assessment of ascending aorta, assessment of surgical repair and hemodynamic monitoring  Location performed:  OR  Intubated  Bite block not placed  Heart visualized  Probe Insertion:  Easy  Probe Type:  Mulitplane  Modalities:  2D, pulse wave Doppler, M-mode, color flow mapping and continuous wave Doppler    Echocardiographic and Doppler Measurements    Ventricles    Ventricle  Cavity Size  Cavity          Dimension  Hypertrophy  Thrombus  Global FXN  EF    RV  normal    No  No  mildly impaired      LV  dilated    Yes  No  moderately impaired (30-39%)  30%       Ventricular Regional Function:  1- Basal Anteroseptal:  hypokinetic  2- Basal Anterior:  hypokinetic  3- Basal Anterolateral:  hypokinetic  4- Basal Inferolateral:  hypokinetic  5- Basal Inferior:  hypokinetic  6- Basal Inferoseptal:  hypokinetic  7- Mid Anteroseptal:  hypokinetic  8- Mid Anterior:  hypokinetic  9- Mid Anterolateral:  hypokinetic  10- Mid Inferolateral:  hypokinetic  11- Mid Inferior:  hypokinetic  12- Mid Inferoseptal:  hypokinetic  13- Apical Anterior:  hypokinetic  14- Apical Lateral:  hypokinetic  15- Apical Inferior:  hypokinetic  16- Apical Septal:  hypokinetic  17- Brothers:  hypokinetic        Valves     Valves  Annulus  Stenosis Measurements   Regurg  Leaflet   Morph  Leaflet   Motion Valve Comments    Aortic calcified Stenosis severe.   Area:0.6 cm²   Peak Gradient:74 mmHg  Mean Gradient: 50 mmHg    mild   calcified, bicuspid, thickened restricted       Mitral normal none             trace normal normal    Tricuspid normal

## 2025-06-04 NOTE — BRIEF OP NOTE
[id number] Vascular grafts GRAFT VASC L30CM [id number] THOR CLLGN POLY WVN STR DBL JOSE [id number] [employer]-WD [id number] N/A 1 Implanted   CLIP MED SUTURE LESS 50 MM SYS 1 HND STRL ATRICLIP FLX V - [id number]  CLIP MED SUTURE LESS 50 MM SYS 1 HND STRL ATRICLIP FLX V [id number] [employer]WD [id number] N/A 1 Implanted     Dianelys Prado PA-C

## 2025-06-04 NOTE — ANESTHESIA PROCEDURE NOTES
Arterial Line:    An arterial line was placed using ultrasound guidance, in the pre-op for the following indication(s): continuous blood pressure monitoring and blood sampling needed.    A 20 gauge (size), 1 and 3/8 inch (length), Arrow (type) catheter was placed, Seldinger technique used, into the right radial artery, secured by tape and Tegaderm.  Anesthesia type: Local  Local infiltration: Injection    Events:  greater than 3 attempts and patient tolerated procedure well with no complications.6/4/2025 7:00 AM6/4/2025 7:12 AM  Performed: Anesthesiologist and Resident/CRNA   Preanesthetic Checklist  Completed: patient identified, IV checked, site marked, risks and benefits discussed, surgical/procedural consents, equipment checked, pre-op evaluation, timeout performed, anesthesia consent given, oxygen available and monitors applied/VS acknowledged

## 2025-06-04 NOTE — OP NOTE
per anesthesia.  A hotshot cardioplegia was given retrograde followed by 500 mL of warm blood.  The clamp was then removed and the heart was started in sinus rhythm.  All anastomosis was then checked for hemostasis.  The patient was then weaned from cardiopulmonary bypass while looking at the LUPILLO images.  The left and right ventricle appeared to be working okay.  So, the patient was  from cardiopulmonary bypass.  There was no residual atrial appendage after the clipping on LUPILLO.  At this point, the protamine is given, the cannulas are removed.  Two  32-Arabic chest tube are placed anteriorly in the mediastinum. Pacing wires were placed on the RV and brought through a separate stab incision.    The patient's chest was then closed with #6 chest wires and the skin and subcutaneous layers closed in layers.    Electronically signed by  Duglas Soto MD    Date: 6/4/2025  Time: 4:08 PM

## 2025-06-04 NOTE — ANESTHESIA PRE PROCEDURE
Department of Anesthesiology  Preprocedure Note       Name:  Jose Díaz III   Age:  59 y.o.  :  1965                                          MRN:  [id number]         Date:  2025      Surgeon: Surgeon(s):  Duglas Soto MD    Procedure: Procedure(s):  AORTIC VALVE REPLACEMENT, ASCENDING AORTA REPAIR, POSSIBLE AORTIC ROOT ENLARGEMENT, WITH CARDIOPLEGIA (WITH PAC)(E.R.A.S.)    Medications prior to admission:   Prior to Admission medications    Medication Sig Start Date End Date Taking? Authorizing Provider   amiodarone (PACERONE) 100 MG tablet Take 4 tablets by mouth 2 times daily for 5 days 25 Yes Jasmyne Oconnell APRN - NP   mupirocin (BACTROBAN) 2 % ointment by Each Nostril route 2 times daily for 2 days 25 Yes Jasmyne Oconnell APRN - NP   chlorhexidine (PERIDEX) 0.12 % solution Swish and spit 15 mLs 2 times daily for 2 days 25 Yes Jasmyne Oconnell APRN - NP   aspirin 81 MG chewable tablet Take 1 tablet by mouth daily 25  Yes Aleksandr River Jr., MD   fluticasone-umeclidin-vilant (TRELEGY ELLIPTA) 100-62.5-25 MCG/ACT AEPB inhaler Inhale 1 puff into the lungs daily 25  Yes Aleksandr River Jr., MD   spironolactone (ALDACTONE) 25 MG tablet Take 0.5 tablets by mouth daily 25  Yes Aleksandr River Jr., MD   pantoprazole (PROTONIX) 40 MG tablet Take 1 tablet by mouth every morning (before breakfast) 25  Yes Aleksandr River Jr., MD   albuterol sulfate HFA (PROVENTIL;VENTOLIN;PROAIR) 108 (90 Base) MCG/ACT inhaler Inhale 2 puffs into the lungs every 4 hours as needed for Wheezing or Shortness of Breath 25  Yes Aleksandr River Jr., MD       Current medications:    Current Facility-Administered Medications   Medication Dose Route Frequency Provider Last Rate Last Admin   • acetaminophen (TYLENOL) tablet 1,000 mg  1,000 mg Oral Once Duglas Soto MD       • gabapentin (NEURONTIN) capsule 300 mg  300 mg Oral Once Dugals Soto,

## 2025-06-05 ENCOUNTER — APPOINTMENT (OUTPATIENT)
Facility: HOSPITAL | Age: 60
DRG: 219 | End: 2025-06-05
Attending: THORACIC SURGERY (CARDIOTHORACIC VASCULAR SURGERY)
Payer: COMMERCIAL

## 2025-06-05 LAB
ACUTE KIDNEY INJURY RISK NEPHROCHECK: 1.54 (ref 0–0.3)
ANION GAP BLD CALC-SCNC: 17 (ref 10–20)
ANION GAP BLD CALC-SCNC: 9 (ref 10–20)
ANION GAP SERPL CALC-SCNC: 3 MMOL/L (ref 2–12)
BASE DEFICIT BLD-SCNC: 4 MMOL/L
BASE EXCESS BLD CALC-SCNC: 0.1 MMOL/L
BASOPHILS # BLD: 0.01 K/UL (ref 0–0.1)
BASOPHILS NFR BLD: 0.1 % (ref 0–1)
BLD PROD TYP BPU: NORMAL
BLOOD BANK BLOOD PRODUCT EXPIRATION DATE: NORMAL
BLOOD BANK DISPENSE STATUS: NORMAL
BLOOD BANK ISBT PRODUCT BLOOD TYPE: 600
BLOOD BANK ISBT PRODUCT BLOOD TYPE: 6200
BLOOD BANK PRODUCT CODE: NORMAL
BLOOD BANK UNIT TYPE AND RH: NORMAL
BPU ID: NORMAL
BUN SERPL-MCNC: 21 MG/DL (ref 6–20)
BUN/CREAT SERPL: 26 (ref 12–20)
CA-I BLD-MCNC: 1.17 MMOL/L (ref 1.15–1.33)
CA-I BLD-MCNC: 1.24 MMOL/L (ref 1.15–1.33)
CALCIUM SERPL-MCNC: 8.3 MG/DL (ref 8.5–10.1)
CHLORIDE BLD-SCNC: 109 MMOL/L (ref 100–111)
CHLORIDE BLD-SCNC: 110 MMOL/L (ref 100–111)
CHLORIDE SERPL-SCNC: 110 MMOL/L (ref 97–108)
CO2 BLD-SCNC: 21 MMOL/L (ref 22–29)
CO2 BLD-SCNC: 24 MMOL/L (ref 22–29)
CO2 SERPL-SCNC: 27 MMOL/L (ref 21–32)
CREAT SERPL-MCNC: 0.82 MG/DL (ref 0.7–1.3)
CREAT UR-MCNC: 0.8 MG/DL (ref 0.6–1.3)
CREAT UR-MCNC: 0.9 MG/DL (ref 0.6–1.3)
DIFFERENTIAL METHOD BLD: ABNORMAL
EOSINOPHIL # BLD: 0 K/UL (ref 0–0.4)
EOSINOPHIL NFR BLD: 0 % (ref 0–7)
ERYTHROCYTE [DISTWIDTH] IN BLOOD BY AUTOMATED COUNT: 14.1 % (ref 11.5–14.5)
GLUCOSE BLD STRIP.AUTO-MCNC: 103 MG/DL (ref 65–117)
GLUCOSE BLD STRIP.AUTO-MCNC: 106 MG/DL (ref 65–117)
GLUCOSE BLD STRIP.AUTO-MCNC: 111 MG/DL (ref 65–117)
GLUCOSE BLD STRIP.AUTO-MCNC: 111 MG/DL (ref 74–99)
GLUCOSE BLD STRIP.AUTO-MCNC: 118 MG/DL (ref 65–117)
GLUCOSE BLD STRIP.AUTO-MCNC: 132 MG/DL (ref 74–99)
GLUCOSE BLD STRIP.AUTO-MCNC: 133 MG/DL (ref 65–117)
GLUCOSE BLD STRIP.AUTO-MCNC: 136 MG/DL (ref 65–117)
GLUCOSE BLD STRIP.AUTO-MCNC: 140 MG/DL (ref 65–117)
GLUCOSE BLD STRIP.AUTO-MCNC: 140 MG/DL (ref 65–117)
GLUCOSE BLD STRIP.AUTO-MCNC: 143 MG/DL (ref 65–117)
GLUCOSE BLD STRIP.AUTO-MCNC: 151 MG/DL (ref 65–117)
GLUCOSE BLD STRIP.AUTO-MCNC: 81 MG/DL (ref 65–117)
GLUCOSE BLD STRIP.AUTO-MCNC: 81 MG/DL (ref 65–117)
GLUCOSE BLD STRIP.AUTO-MCNC: 95 MG/DL (ref 65–117)
GLUCOSE BLD STRIP.AUTO-MCNC: 96 MG/DL (ref 65–117)
GLUCOSE BLD STRIP.AUTO-MCNC: 99 MG/DL (ref 65–117)
GLUCOSE BLD-MCNC: 111 MG/DL (ref 74–99)
GLUCOSE BLD-MCNC: 124 MG/DL (ref 74–99)
GLUCOSE BLD-MCNC: 131 MG/DL (ref 74–99)
GLUCOSE BLD-MCNC: 137 MG/DL (ref 74–99)
GLUCOSE BLD-MCNC: 98 MG/DL (ref 74–99)
GLUCOSE SERPL-MCNC: 136 MG/DL (ref 65–100)
HCO3 BLDA-SCNC: 21 MMOL/L
HCO3 BLDA-SCNC: 25 MMOL/L
HCT VFR BLD AUTO: 22.9 % (ref 36.6–50.3)
HGB BLD-MCNC: 7.5 G/DL (ref 12.1–17)
IMM GRANULOCYTES # BLD AUTO: 0.06 K/UL (ref 0–0.04)
IMM GRANULOCYTES NFR BLD AUTO: 0.6 % (ref 0–0.5)
LACTATE BLD-SCNC: 1.19 MMOL/L (ref 0.4–2)
LACTATE BLD-SCNC: 1.69 MMOL/L (ref 0.4–2)
LYMPHOCYTES # BLD: 0.55 K/UL (ref 0.8–3.5)
LYMPHOCYTES NFR BLD: 5.1 % (ref 12–49)
MAGNESIUM SERPL-MCNC: 2 MG/DL (ref 1.6–2.4)
MCH RBC QN AUTO: 30.6 PG (ref 26–34)
MCHC RBC AUTO-ENTMCNC: 32.8 G/DL (ref 30–36.5)
MCV RBC AUTO: 93.5 FL (ref 80–99)
MONOCYTES # BLD: 1.33 K/UL (ref 0–1)
MONOCYTES NFR BLD: 12.4 % (ref 5–13)
NEUTS SEG # BLD: 8.75 K/UL (ref 1.8–8)
NEUTS SEG NFR BLD: 81.8 % (ref 32–75)
NRBC # BLD: 0 K/UL (ref 0–0.01)
NRBC BLD-RTO: 0 PER 100 WBC
PCO2 BLD: 39.2 MMHG (ref 35–48)
PCO2 BLD: 39.4 MMHG (ref 35–48)
PH BLD: 7.34 (ref 7.35–7.45)
PH BLD: 7.41 (ref 7.35–7.45)
PLATELET # BLD AUTO: 81 K/UL (ref 150–400)
PLATELET COMMENT: ABNORMAL
PMV BLD AUTO: 10.2 FL (ref 8.9–12.9)
PO2 BLD: 86 MMHG (ref 83–108)
PO2 BLD: 90 MMHG (ref 83–108)
POTASSIUM BLD-SCNC: 4.2 MMOL/L (ref 3.5–5.5)
POTASSIUM BLD-SCNC: 4.9 MMOL/L (ref 3.5–5.5)
POTASSIUM SERPL-SCNC: 4.9 MMOL/L (ref 3.5–5.1)
RBC # BLD AUTO: 2.45 M/UL (ref 4.1–5.7)
RBC MORPH BLD: ABNORMAL
SAO2 % BLD: 96 % (ref 94–98)
SAO2 % BLD: 97 % (ref 94–98)
SERVICE CMNT-IMP: ABNORMAL
SERVICE CMNT-IMP: NORMAL
SODIUM BLD-SCNC: 143 MMOL/L (ref 136–145)
SODIUM BLD-SCNC: 147 MMOL/L (ref 136–145)
SODIUM SERPL-SCNC: 140 MMOL/L (ref 136–145)
SPECIMEN SITE: ABNORMAL
SPECIMEN SITE: ABNORMAL
UNIT DIVISION: 0
UNIT ISSUE DATE/TIME: NORMAL
WBC # BLD AUTO: 10.7 K/UL (ref 4.1–11.1)

## 2025-06-05 PROCEDURE — 97530 THERAPEUTIC ACTIVITIES: CPT

## 2025-06-05 PROCEDURE — 84132 ASSAY OF SERUM POTASSIUM: CPT

## 2025-06-05 PROCEDURE — 94660 CPAP INITIATION&MGMT: CPT

## 2025-06-05 PROCEDURE — 97116 GAIT TRAINING THERAPY: CPT

## 2025-06-05 PROCEDURE — 2580000003 HC RX 258: Performed by: PHYSICIAN ASSISTANT

## 2025-06-05 PROCEDURE — 93005 ELECTROCARDIOGRAM TRACING: CPT | Performed by: PHYSICIAN ASSISTANT

## 2025-06-05 PROCEDURE — 36415 COLL VENOUS BLD VENIPUNCTURE: CPT

## 2025-06-05 PROCEDURE — 82330 ASSAY OF CALCIUM: CPT

## 2025-06-05 PROCEDURE — 6370000000 HC RX 637 (ALT 250 FOR IP): Performed by: PHYSICIAN ASSISTANT

## 2025-06-05 PROCEDURE — 71045 X-RAY EXAM CHEST 1 VIEW: CPT

## 2025-06-05 PROCEDURE — 2500000003 HC RX 250 WO HCPCS: Performed by: PHYSICIAN ASSISTANT

## 2025-06-05 PROCEDURE — 82803 BLOOD GASES ANY COMBINATION: CPT

## 2025-06-05 PROCEDURE — 85025 COMPLETE CBC W/AUTO DIFF WBC: CPT

## 2025-06-05 PROCEDURE — 82962 GLUCOSE BLOOD TEST: CPT

## 2025-06-05 PROCEDURE — 97166 OT EVAL MOD COMPLEX 45 MIN: CPT

## 2025-06-05 PROCEDURE — 6360000002 HC RX W HCPCS: Performed by: PHYSICIAN ASSISTANT

## 2025-06-05 PROCEDURE — 2700000000 HC OXYGEN THERAPY PER DAY

## 2025-06-05 PROCEDURE — 80048 BASIC METABOLIC PNL TOTAL CA: CPT

## 2025-06-05 PROCEDURE — 97110 THERAPEUTIC EXERCISES: CPT

## 2025-06-05 PROCEDURE — 94640 AIRWAY INHALATION TREATMENT: CPT

## 2025-06-05 PROCEDURE — 6370000000 HC RX 637 (ALT 250 FOR IP): Performed by: THORACIC SURGERY (CARDIOTHORACIC VASCULAR SURGERY)

## 2025-06-05 PROCEDURE — 97162 PT EVAL MOD COMPLEX 30 MIN: CPT

## 2025-06-05 PROCEDURE — 82947 ASSAY GLUCOSE BLOOD QUANT: CPT

## 2025-06-05 PROCEDURE — 2100000001 HC CVICU R&B

## 2025-06-05 PROCEDURE — P9045 ALBUMIN (HUMAN), 5%, 250 ML: HCPCS | Performed by: PHYSICIAN ASSISTANT

## 2025-06-05 PROCEDURE — 99221 1ST HOSP IP/OBS SF/LOW 40: CPT | Performed by: INTERNAL MEDICINE

## 2025-06-05 PROCEDURE — 83735 ASSAY OF MAGNESIUM: CPT

## 2025-06-05 PROCEDURE — 84295 ASSAY OF SERUM SODIUM: CPT

## 2025-06-05 RX ORDER — FAMOTIDINE 20 MG/1
20 TABLET, FILM COATED ORAL 2 TIMES DAILY
Status: DISCONTINUED | OUTPATIENT
Start: 2025-06-05 | End: 2025-06-06

## 2025-06-05 RX ORDER — METHOCARBAMOL 750 MG/1
750 TABLET, FILM COATED ORAL 4 TIMES DAILY
Status: DISCONTINUED | OUTPATIENT
Start: 2025-06-05 | End: 2025-06-10 | Stop reason: HOSPADM

## 2025-06-05 RX ADMIN — ASPIRIN 81 MG: 81 TABLET, CHEWABLE ORAL at 08:25

## 2025-06-05 RX ADMIN — OXYCODONE 10 MG: 5 TABLET ORAL at 08:28

## 2025-06-05 RX ADMIN — OXYCODONE 5 MG: 5 TABLET ORAL at 22:09

## 2025-06-05 RX ADMIN — MUPIROCIN: 20 OINTMENT TOPICAL at 09:57

## 2025-06-05 RX ADMIN — MUPIROCIN: 20 OINTMENT TOPICAL at 21:11

## 2025-06-05 RX ADMIN — SODIUM CHLORIDE 2.4 UNITS/HR: 9 INJECTION, SOLUTION INTRAVENOUS at 18:19

## 2025-06-05 RX ADMIN — WATER 2000 MG: 1 INJECTION INTRAMUSCULAR; INTRAVENOUS; SUBCUTANEOUS at 20:02

## 2025-06-05 RX ADMIN — SODIUM CHLORIDE, PRESERVATIVE FREE 10 ML: 5 INJECTION INTRAVENOUS at 09:57

## 2025-06-05 RX ADMIN — METHOCARBAMOL TABLETS 750 MG: 750 TABLET, COATED ORAL at 17:42

## 2025-06-05 RX ADMIN — METHOCARBAMOL TABLETS 750 MG: 750 TABLET, COATED ORAL at 10:54

## 2025-06-05 RX ADMIN — DOCUSATE SODIUM 50MG AND SENNOSIDES 8.6MG 1 TABLET: 8.6; 5 TABLET, FILM COATED ORAL at 08:25

## 2025-06-05 RX ADMIN — WATER 2000 MG: 1 INJECTION INTRAMUSCULAR; INTRAVENOUS; SUBCUTANEOUS at 04:18

## 2025-06-05 RX ADMIN — GABAPENTIN 200 MG: 100 CAPSULE ORAL at 21:10

## 2025-06-05 RX ADMIN — METHOCARBAMOL TABLETS 750 MG: 750 TABLET, COATED ORAL at 21:09

## 2025-06-05 RX ADMIN — IPRATROPIUM BROMIDE 0.5 MG: 0.5 SOLUTION RESPIRATORY (INHALATION) at 08:15

## 2025-06-05 RX ADMIN — ACETAMINOPHEN 1000 MG: 500 TABLET, FILM COATED ORAL at 05:53

## 2025-06-05 RX ADMIN — ALBUMIN (HUMAN) 12.5 G: 12.5 INJECTION, SOLUTION INTRAVENOUS at 06:37

## 2025-06-05 RX ADMIN — Medication 400 MG: at 08:26

## 2025-06-05 RX ADMIN — ACETAMINOPHEN 1000 MG: 500 TABLET, FILM COATED ORAL at 17:42

## 2025-06-05 RX ADMIN — SODIUM CHLORIDE 6.2 UNITS/HR: 9 INJECTION, SOLUTION INTRAVENOUS at 01:11

## 2025-06-05 RX ADMIN — CALCIUM CHLORIDE 1000 MG: 100 INJECTION, SOLUTION INTRAVENOUS at 09:01

## 2025-06-05 RX ADMIN — BUDESONIDE 250 MCG: 0.25 INHALANT RESPIRATORY (INHALATION) at 08:20

## 2025-06-05 RX ADMIN — ARFORMOTEROL TARTRATE 15 MCG: 15 SOLUTION RESPIRATORY (INHALATION) at 08:20

## 2025-06-05 RX ADMIN — PANTOPRAZOLE SODIUM 40 MG: 40 TABLET, DELAYED RELEASE ORAL at 05:54

## 2025-06-05 RX ADMIN — IPRATROPIUM BROMIDE 0.5 MG: 0.5 SOLUTION RESPIRATORY (INHALATION) at 01:00

## 2025-06-05 RX ADMIN — GABAPENTIN 200 MG: 100 CAPSULE ORAL at 08:26

## 2025-06-05 RX ADMIN — FAMOTIDINE 20 MG: 20 TABLET, FILM COATED ORAL at 21:10

## 2025-06-05 RX ADMIN — POLYETHYLENE GLYCOL 3350 17 G: 17 POWDER, FOR SOLUTION ORAL at 09:57

## 2025-06-05 RX ADMIN — IPRATROPIUM BROMIDE 0.5 MG: 0.5 SOLUTION RESPIRATORY (INHALATION) at 14:39

## 2025-06-05 RX ADMIN — 0.12% CHLORHEXIDINE GLUCONATE 15 ML: 1.2 RINSE ORAL at 21:27

## 2025-06-05 RX ADMIN — WATER 2000 MG: 1 INJECTION INTRAMUSCULAR; INTRAVENOUS; SUBCUTANEOUS at 12:00

## 2025-06-05 RX ADMIN — ACETAMINOPHEN 1000 MG: 500 TABLET, FILM COATED ORAL at 08:25

## 2025-06-05 RX ADMIN — 0.12% CHLORHEXIDINE GLUCONATE 15 ML: 1.2 RINSE ORAL at 09:58

## 2025-06-05 RX ADMIN — NOREPINEPHRINE BITARTRATE 3 MCG/MIN: 64 SOLUTION INTRAVENOUS at 12:32

## 2025-06-05 RX ADMIN — ARFORMOTEROL TARTRATE 15 MCG: 15 SOLUTION RESPIRATORY (INHALATION) at 19:10

## 2025-06-05 RX ADMIN — FAMOTIDINE 20 MG: 20 TABLET, FILM COATED ORAL at 08:26

## 2025-06-05 RX ADMIN — BUDESONIDE 250 MCG: 0.25 INHALANT RESPIRATORY (INHALATION) at 19:10

## 2025-06-05 RX ADMIN — GABAPENTIN 200 MG: 100 CAPSULE ORAL at 14:11

## 2025-06-05 RX ADMIN — OXYCODONE 5 MG: 5 TABLET ORAL at 00:14

## 2025-06-05 RX ADMIN — EPINEPHRINE 4 MCG/MIN: 1 INJECTION INTRAMUSCULAR; INTRAVENOUS; SUBCUTANEOUS at 05:38

## 2025-06-05 RX ADMIN — IPRATROPIUM BROMIDE 0.5 MG: 0.5 SOLUTION RESPIRATORY (INHALATION) at 19:05

## 2025-06-05 RX ADMIN — METHOCARBAMOL TABLETS 750 MG: 750 TABLET, COATED ORAL at 14:45

## 2025-06-05 RX ADMIN — ATORVASTATIN CALCIUM 40 MG: 40 TABLET, FILM COATED ORAL at 21:10

## 2025-06-05 RX ADMIN — Medication 400 MG: at 21:10

## 2025-06-05 RX ADMIN — SODIUM CHLORIDE, PRESERVATIVE FREE 10 ML: 5 INJECTION INTRAVENOUS at 20:06

## 2025-06-05 RX ADMIN — OXYCODONE 10 MG: 5 TABLET ORAL at 14:11

## 2025-06-05 RX ADMIN — DOCUSATE SODIUM 50MG AND SENNOSIDES 8.6MG 1 TABLET: 8.6; 5 TABLET, FILM COATED ORAL at 21:10

## 2025-06-05 RX ADMIN — AMIODARONE HYDROCHLORIDE 400 MG: 200 TABLET ORAL at 08:25

## 2025-06-05 ASSESSMENT — PAIN SCALES - GENERAL
PAINLEVEL_OUTOF10: 4
PAINLEVEL_OUTOF10: 4
PAINLEVEL_OUTOF10: 5
PAINLEVEL_OUTOF10: 4
PAINLEVEL_OUTOF10: 3
PAINLEVEL_OUTOF10: 5
PAINLEVEL_OUTOF10: 4
PAINLEVEL_OUTOF10: 4
PAINLEVEL_OUTOF10: 6
PAINLEVEL_OUTOF10: 5
PAINLEVEL_OUTOF10: 6
PAINLEVEL_OUTOF10: 3
PAINLEVEL_OUTOF10: 3
PAINLEVEL_OUTOF10: 7

## 2025-06-05 ASSESSMENT — PAIN DESCRIPTION - ORIENTATION
ORIENTATION: ANTERIOR

## 2025-06-05 ASSESSMENT — PAIN DESCRIPTION - DESCRIPTORS
DESCRIPTORS: SORE
DESCRIPTORS: ACHING
DESCRIPTORS: ACHING
DESCRIPTORS: ACHING;SORE
DESCRIPTORS: SORE

## 2025-06-05 ASSESSMENT — PAIN - FUNCTIONAL ASSESSMENT
PAIN_FUNCTIONAL_ASSESSMENT: PREVENTS OR INTERFERES SOME ACTIVE ACTIVITIES AND ADLS
PAIN_FUNCTIONAL_ASSESSMENT: ACTIVITIES ARE NOT PREVENTED
PAIN_FUNCTIONAL_ASSESSMENT: PREVENTS OR INTERFERES SOME ACTIVE ACTIVITIES AND ADLS

## 2025-06-05 ASSESSMENT — PAIN DESCRIPTION - FREQUENCY
FREQUENCY: INTERMITTENT
FREQUENCY: CONTINUOUS

## 2025-06-05 ASSESSMENT — PAIN DESCRIPTION - ONSET
ONSET: ON-GOING
ONSET: ON-GOING
ONSET: PROGRESSIVE

## 2025-06-05 ASSESSMENT — PAIN DESCRIPTION - PAIN TYPE
TYPE: SURGICAL PAIN
TYPE: SURGICAL PAIN
TYPE: ACUTE PAIN;SURGICAL PAIN
TYPE: SURGICAL PAIN
TYPE: ACUTE PAIN

## 2025-06-05 ASSESSMENT — PAIN DESCRIPTION - LOCATION
LOCATION: CHEST
LOCATION: CHEST;INCISION
LOCATION: INCISION;STERNUM
LOCATION: INCISION
LOCATION: CHEST;INCISION
LOCATION: CHEST
LOCATION: STERNUM

## 2025-06-05 NOTE — CONSULTS
This is a 59-year-old male with a history of COPD and severe aortic stenosis with recent hospitalization for acute respiratory failure 5/13/2025 now status post aortic valve replacement on 6/4/2025.  The patient does not have a history of diabetes but we are asked to assist in glucose management.    Regarding glucose tolerance, the patient tells me that he has never had image issues with blood sugar control and his A1c of 5.2% supports that.  He says his diet is good.  He does intermittently require glucocorticoids for his COPD including his recent admission in May 2025.  He is currently receiving insulin infusion per protocol at a current rate of 6.7 units an hour.    Examination  Blood pressure 104/63  Pulse 63  Tmax 100  95% on 11 L high flow    Recent laboratory data  Glucose 140 (range )  Creatinine 0.82  Bicarb 27    Impression  1.  Aortic stenosis status post AVR 6/4/2025  2.  COPD with intermittent steroid requirements  3.  Glucose intolerance with a A1c of 5.2%    Plan:  1.  He will continue on insulin infusion per protocol  2.  Unless the patient requires steroids for an exacerbation of his COPD, we do not anticipate the requirement of further insulin following completion of his 48 hours of insulin infusion  3.  We will follow with you    Before making these care recommendations, I personally reviewed the hospitalization record, including notes, laboratory & diagnostic data and current medications, and examined the patient at the bedside. Total time   40  minutes,.      Please note that this dictation was completed with [employer], the computer voice recognition software.  Quite often unanticipated grammatical, syntax, homophones, and other interpretive errors are inadvertently transcribed by the computer software.  Please disregard these errors.  Please excuse any errors that have escaped final proofreading.

## 2025-06-05 NOTE — FLOWSHEET NOTE
Repositioning    Incision Care Completed This Shift: YES    CHG Bath Completed This Shift: NO    Pain Management:   Excellent - Able to sleep and participate with therapy     Patient Safety:  Fall Risk: Nursing Judgement-Fall Risk High(Add Comments): No  Fall Risk Interventions  Nursing Judgement-Fall Risk High(Add Comments): No  Toilet Every 2 Hours-In Advance of Need: Yes  Hourly Visual Checks: In bed  Fall Visual Posted: Fall sign posted  Room Door Open: Yes  Alarm On: Bed  Patient Moved Closer to Nursing Station: No    Intake/Output  I/O this shift:  In: 2206.9 [P.O.:1780; I.V.:426.9]  Out: 525 [Urine:275; Chest Tube:250]     Past 24 hrs  In: 89834.1 [P.O.:3290; I.V.:2667.5; Blood:3047.3]  Out: 3018 [Urine:1882]     Net IO Since Admission: 7,897.06 mL [06/05/25 1753]      Last Weight Metrics:      6/5/2025     6:00 AM 6/4/2025     5:59 AM 5/29/2025     1:24 PM 5/20/2025     6:34 AM 5/19/2025     4:25 AM 5/18/2025     5:00 AM 5/16/2025     5:15 AM   Weight Loss Metrics   Height  5' 11\" 5' 11\"    6' 0.008\"   Weight - Scale 243 lbs 3 oz 236 lbs 9 oz 231 lbs 15 oz 219 lbs 13 oz 217 lbs 217 lbs 13 oz 216 lbs 8 oz   BMI (Calculated) 34 kg/m2 33.1 kg/m2 32.4 kg/m2 29.9 kg/m2 29.5 kg/m2 29.6 kg/m2 29.4 kg/m2       Weight change: 3 kg (6 lb 9.8 oz)     Accuracy of I/O Report Reviewed: YES    Drips:   Epinephrine and Insulin    Number of Albumin Given: 0    Critical Lab Results:  None    Active Consults:   IP CONSULT TO DIABETES MANAGEMENT  IP CONSULT TO CARDIAC REHAB  IP CONSULT TO CASE MANAGEMENT  IP CONSULT TO INTERNAL MEDICINE    Length of Stay:  Expected LOS: 5  Actual LOS: 1    Geeta Garces RN

## 2025-06-06 ENCOUNTER — APPOINTMENT (OUTPATIENT)
Facility: HOSPITAL | Age: 60
DRG: 219 | End: 2025-06-06
Attending: THORACIC SURGERY (CARDIOTHORACIC VASCULAR SURGERY)
Payer: COMMERCIAL

## 2025-06-06 LAB
ANION GAP SERPL CALC-SCNC: 7 MMOL/L (ref 2–12)
BASOPHILS # BLD: 0.02 K/UL (ref 0–0.1)
BASOPHILS NFR BLD: 0.2 % (ref 0–1)
BUN SERPL-MCNC: 34 MG/DL (ref 6–20)
BUN/CREAT SERPL: 29 (ref 12–20)
CALCIUM SERPL-MCNC: 8.6 MG/DL (ref 8.5–10.1)
CHLORIDE SERPL-SCNC: 101 MMOL/L (ref 97–108)
CO2 SERPL-SCNC: 23 MMOL/L (ref 21–32)
CREAT SERPL-MCNC: 1.18 MG/DL (ref 0.7–1.3)
DIFFERENTIAL METHOD BLD: ABNORMAL
EOSINOPHIL # BLD: 0 K/UL (ref 0–0.4)
EOSINOPHIL NFR BLD: 0 % (ref 0–7)
ERYTHROCYTE [DISTWIDTH] IN BLOOD BY AUTOMATED COUNT: 14.3 % (ref 11.5–14.5)
GLUCOSE BLD STRIP.AUTO-MCNC: 101 MG/DL (ref 65–117)
GLUCOSE BLD STRIP.AUTO-MCNC: 104 MG/DL (ref 65–117)
GLUCOSE BLD STRIP.AUTO-MCNC: 106 MG/DL (ref 65–117)
GLUCOSE BLD STRIP.AUTO-MCNC: 108 MG/DL (ref 65–117)
GLUCOSE BLD STRIP.AUTO-MCNC: 108 MG/DL (ref 65–117)
GLUCOSE BLD STRIP.AUTO-MCNC: 112 MG/DL (ref 65–117)
GLUCOSE BLD STRIP.AUTO-MCNC: 118 MG/DL (ref 65–117)
GLUCOSE BLD STRIP.AUTO-MCNC: 119 MG/DL (ref 65–117)
GLUCOSE BLD STRIP.AUTO-MCNC: 121 MG/DL (ref 65–117)
GLUCOSE BLD STRIP.AUTO-MCNC: 123 MG/DL (ref 65–117)
GLUCOSE BLD STRIP.AUTO-MCNC: 127 MG/DL (ref 65–117)
GLUCOSE BLD STRIP.AUTO-MCNC: 128 MG/DL (ref 65–117)
GLUCOSE BLD STRIP.AUTO-MCNC: 149 MG/DL (ref 65–117)
GLUCOSE BLD STRIP.AUTO-MCNC: 155 MG/DL (ref 65–117)
GLUCOSE BLD STRIP.AUTO-MCNC: 191 MG/DL (ref 65–117)
GLUCOSE BLD STRIP.AUTO-MCNC: 91 MG/DL (ref 65–117)
GLUCOSE SERPL-MCNC: 93 MG/DL (ref 65–100)
HCT VFR BLD AUTO: 23.6 % (ref 36.6–50.3)
HGB BLD-MCNC: 7.7 G/DL (ref 12.1–17)
IMM GRANULOCYTES # BLD AUTO: 0.1 K/UL (ref 0–0.04)
IMM GRANULOCYTES NFR BLD AUTO: 0.9 % (ref 0–0.5)
LYMPHOCYTES # BLD: 0.88 K/UL (ref 0.8–3.5)
LYMPHOCYTES NFR BLD: 8.3 % (ref 12–49)
MAGNESIUM SERPL-MCNC: 2.3 MG/DL (ref 1.6–2.4)
MCH RBC QN AUTO: 30.8 PG (ref 26–34)
MCHC RBC AUTO-ENTMCNC: 32.6 G/DL (ref 30–36.5)
MCV RBC AUTO: 94.4 FL (ref 80–99)
MONOCYTES # BLD: 1.46 K/UL (ref 0–1)
MONOCYTES NFR BLD: 13.8 % (ref 5–13)
NEUTS SEG # BLD: 8.14 K/UL (ref 1.8–8)
NEUTS SEG NFR BLD: 76.8 % (ref 32–75)
NRBC # BLD: 0 K/UL (ref 0–0.01)
NRBC BLD-RTO: 0 PER 100 WBC
PLATELET # BLD AUTO: 71 K/UL (ref 150–400)
PMV BLD AUTO: 11.2 FL (ref 8.9–12.9)
POTASSIUM SERPL-SCNC: 5 MMOL/L (ref 3.5–5.1)
POTASSIUM SERPL-SCNC: 5.4 MMOL/L (ref 3.5–5.1)
POTASSIUM SERPL-SCNC: 5.7 MMOL/L (ref 3.5–5.1)
RBC # BLD AUTO: 2.5 M/UL (ref 4.1–5.7)
RBC MORPH BLD: ABNORMAL
SAO2 % BLDMV: 41 % (ref 65–88)
SERVICE CMNT-IMP: ABNORMAL
SERVICE CMNT-IMP: NORMAL
SODIUM SERPL-SCNC: 131 MMOL/L (ref 136–145)
WBC # BLD AUTO: 10.6 K/UL (ref 4.1–11.1)

## 2025-06-06 PROCEDURE — 97110 THERAPEUTIC EXERCISES: CPT

## 2025-06-06 PROCEDURE — 6360000002 HC RX W HCPCS: Performed by: PHYSICIAN ASSISTANT

## 2025-06-06 PROCEDURE — 82962 GLUCOSE BLOOD TEST: CPT

## 2025-06-06 PROCEDURE — 82803 BLOOD GASES ANY COMBINATION: CPT

## 2025-06-06 PROCEDURE — 6370000000 HC RX 637 (ALT 250 FOR IP): Performed by: NURSE PRACTITIONER

## 2025-06-06 PROCEDURE — 71045 X-RAY EXAM CHEST 1 VIEW: CPT

## 2025-06-06 PROCEDURE — 83735 ASSAY OF MAGNESIUM: CPT

## 2025-06-06 PROCEDURE — 2700000000 HC OXYGEN THERAPY PER DAY

## 2025-06-06 PROCEDURE — 97116 GAIT TRAINING THERAPY: CPT

## 2025-06-06 PROCEDURE — 97530 THERAPEUTIC ACTIVITIES: CPT

## 2025-06-06 PROCEDURE — 36415 COLL VENOUS BLD VENIPUNCTURE: CPT

## 2025-06-06 PROCEDURE — 2500000003 HC RX 250 WO HCPCS: Performed by: PHYSICIAN ASSISTANT

## 2025-06-06 PROCEDURE — 2060000000 HC ICU INTERMEDIATE R&B

## 2025-06-06 PROCEDURE — 80048 BASIC METABOLIC PNL TOTAL CA: CPT

## 2025-06-06 PROCEDURE — 6360000002 HC RX W HCPCS: Performed by: THORACIC SURGERY (CARDIOTHORACIC VASCULAR SURGERY)

## 2025-06-06 PROCEDURE — 84132 ASSAY OF SERUM POTASSIUM: CPT

## 2025-06-06 PROCEDURE — 6360000002 HC RX W HCPCS: Performed by: INTERNAL MEDICINE

## 2025-06-06 PROCEDURE — 6370000000 HC RX 637 (ALT 250 FOR IP): Performed by: THORACIC SURGERY (CARDIOTHORACIC VASCULAR SURGERY)

## 2025-06-06 PROCEDURE — 6370000000 HC RX 637 (ALT 250 FOR IP): Performed by: PHYSICIAN ASSISTANT

## 2025-06-06 PROCEDURE — 85025 COMPLETE CBC W/AUTO DIFF WBC: CPT

## 2025-06-06 PROCEDURE — 94640 AIRWAY INHALATION TREATMENT: CPT

## 2025-06-06 RX ORDER — ENOXAPARIN SODIUM 100 MG/ML
30 INJECTION SUBCUTANEOUS 2 TIMES DAILY
Status: DISCONTINUED | OUTPATIENT
Start: 2025-06-06 | End: 2025-06-10 | Stop reason: HOSPADM

## 2025-06-06 RX ORDER — FUROSEMIDE 10 MG/ML
40 INJECTION INTRAMUSCULAR; INTRAVENOUS ONCE
Status: COMPLETED | OUTPATIENT
Start: 2025-06-06 | End: 2025-06-06

## 2025-06-06 RX ORDER — ARFORMOTEROL TARTRATE 15 UG/2ML
15 SOLUTION RESPIRATORY (INHALATION)
Status: DISCONTINUED | OUTPATIENT
Start: 2025-06-06 | End: 2025-06-10 | Stop reason: HOSPADM

## 2025-06-06 RX ORDER — INSULIN GLARGINE 100 [IU]/ML
16 INJECTION, SOLUTION SUBCUTANEOUS ONCE
Status: COMPLETED | OUTPATIENT
Start: 2025-06-06 | End: 2025-06-06

## 2025-06-06 RX ORDER — BUDESONIDE 0.25 MG/2ML
0.25 INHALANT ORAL
Status: DISCONTINUED | OUTPATIENT
Start: 2025-06-06 | End: 2025-06-10 | Stop reason: HOSPADM

## 2025-06-06 RX ADMIN — GABAPENTIN 200 MG: 100 CAPSULE ORAL at 14:43

## 2025-06-06 RX ADMIN — SODIUM ZIRCONIUM CYCLOSILICATE 10 G: 10 POWDER, FOR SUSPENSION ORAL at 05:14

## 2025-06-06 RX ADMIN — 0.12% CHLORHEXIDINE GLUCONATE 15 ML: 1.2 RINSE ORAL at 09:47

## 2025-06-06 RX ADMIN — WATER 2000 MG: 1 INJECTION INTRAMUSCULAR; INTRAVENOUS; SUBCUTANEOUS at 04:08

## 2025-06-06 RX ADMIN — GABAPENTIN 200 MG: 100 CAPSULE ORAL at 20:25

## 2025-06-06 RX ADMIN — ENOXAPARIN SODIUM 30 MG: 100 INJECTION SUBCUTANEOUS at 09:47

## 2025-06-06 RX ADMIN — ACETAMINOPHEN 1000 MG: 500 TABLET, FILM COATED ORAL at 14:43

## 2025-06-06 RX ADMIN — INSULIN GLARGINE 16 UNITS: 100 INJECTION, SOLUTION SUBCUTANEOUS at 14:42

## 2025-06-06 RX ADMIN — ENOXAPARIN SODIUM 30 MG: 100 INJECTION SUBCUTANEOUS at 20:26

## 2025-06-06 RX ADMIN — MUPIROCIN: 20 OINTMENT TOPICAL at 09:51

## 2025-06-06 RX ADMIN — ARFORMOTEROL TARTRATE 15 MCG: 15 SOLUTION RESPIRATORY (INHALATION) at 20:51

## 2025-06-06 RX ADMIN — DOCUSATE SODIUM 50MG AND SENNOSIDES 8.6MG 1 TABLET: 8.6; 5 TABLET, FILM COATED ORAL at 09:45

## 2025-06-06 RX ADMIN — POLYETHYLENE GLYCOL 3350 17 G: 17 POWDER, FOR SOLUTION ORAL at 09:47

## 2025-06-06 RX ADMIN — METHOCARBAMOL TABLETS 750 MG: 750 TABLET, COATED ORAL at 20:24

## 2025-06-06 RX ADMIN — ACETAMINOPHEN 1000 MG: 500 TABLET, FILM COATED ORAL at 04:05

## 2025-06-06 RX ADMIN — INSULIN LISPRO 1 UNITS: 100 INJECTION, SOLUTION INTRAVENOUS; SUBCUTANEOUS at 20:27

## 2025-06-06 RX ADMIN — MUPIROCIN: 20 OINTMENT TOPICAL at 20:26

## 2025-06-06 RX ADMIN — GABAPENTIN 200 MG: 100 CAPSULE ORAL at 09:45

## 2025-06-06 RX ADMIN — ACETAMINOPHEN 1000 MG: 500 TABLET, FILM COATED ORAL at 09:45

## 2025-06-06 RX ADMIN — IPRATROPIUM BROMIDE 0.5 MG: 0.5 SOLUTION RESPIRATORY (INHALATION) at 20:51

## 2025-06-06 RX ADMIN — OXYCODONE 5 MG: 5 TABLET ORAL at 20:24

## 2025-06-06 RX ADMIN — SODIUM CHLORIDE, PRESERVATIVE FREE 10 ML: 5 INJECTION INTRAVENOUS at 20:26

## 2025-06-06 RX ADMIN — FUROSEMIDE 40 MG: 10 INJECTION, SOLUTION INTRAMUSCULAR; INTRAVENOUS at 09:57

## 2025-06-06 RX ADMIN — PANTOPRAZOLE SODIUM 40 MG: 40 TABLET, DELAYED RELEASE ORAL at 05:14

## 2025-06-06 RX ADMIN — ACETAMINOPHEN 1000 MG: 500 TABLET, FILM COATED ORAL at 20:25

## 2025-06-06 RX ADMIN — SODIUM CHLORIDE, PRESERVATIVE FREE 10 ML: 5 INJECTION INTRAVENOUS at 08:21

## 2025-06-06 RX ADMIN — BUDESONIDE 250 MCG: 0.25 INHALANT RESPIRATORY (INHALATION) at 20:51

## 2025-06-06 RX ADMIN — DOCUSATE SODIUM 50MG AND SENNOSIDES 8.6MG 1 TABLET: 8.6; 5 TABLET, FILM COATED ORAL at 20:25

## 2025-06-06 RX ADMIN — METHOCARBAMOL TABLETS 750 MG: 750 TABLET, COATED ORAL at 09:42

## 2025-06-06 RX ADMIN — ASPIRIN 81 MG: 81 TABLET, CHEWABLE ORAL at 09:46

## 2025-06-06 RX ADMIN — Medication 400 MG: at 20:26

## 2025-06-06 RX ADMIN — ATORVASTATIN CALCIUM 40 MG: 40 TABLET, FILM COATED ORAL at 20:24

## 2025-06-06 RX ADMIN — 0.12% CHLORHEXIDINE GLUCONATE 15 ML: 1.2 RINSE ORAL at 20:26

## 2025-06-06 RX ADMIN — ARFORMOTEROL TARTRATE 15 MCG: 15 SOLUTION RESPIRATORY (INHALATION) at 08:47

## 2025-06-06 RX ADMIN — METHOCARBAMOL TABLETS 750 MG: 750 TABLET, COATED ORAL at 17:07

## 2025-06-06 RX ADMIN — BUDESONIDE 250 MCG: 0.25 INHALANT RESPIRATORY (INHALATION) at 08:47

## 2025-06-06 RX ADMIN — Medication 400 MG: at 09:45

## 2025-06-06 RX ADMIN — IPRATROPIUM BROMIDE 0.5 MG: 0.5 SOLUTION RESPIRATORY (INHALATION) at 08:47

## 2025-06-06 ASSESSMENT — PAIN DESCRIPTION - ORIENTATION: ORIENTATION: MID

## 2025-06-06 ASSESSMENT — PAIN DESCRIPTION - DESCRIPTORS: DESCRIPTORS: SHARP

## 2025-06-06 ASSESSMENT — PAIN SCALES - GENERAL
PAINLEVEL_OUTOF10: 4
PAINLEVEL_OUTOF10: 4
PAINLEVEL_OUTOF10: 3
PAINLEVEL_OUTOF10: 2
PAINLEVEL_OUTOF10: 6

## 2025-06-06 ASSESSMENT — PAIN - FUNCTIONAL ASSESSMENT: PAIN_FUNCTIONAL_ASSESSMENT: ACTIVITIES ARE NOT PREVENTED

## 2025-06-06 ASSESSMENT — PAIN DESCRIPTION - PAIN TYPE: TYPE: SURGICAL PAIN

## 2025-06-06 ASSESSMENT — PAIN DESCRIPTION - ONSET: ONSET: ON-GOING

## 2025-06-06 ASSESSMENT — PAIN DESCRIPTION - LOCATION: LOCATION: CHEST;INCISION

## 2025-06-06 NOTE — DISCHARGE INSTRUCTIONS
[facility]     [address] [facility]                        [address] [place] [address] I  [address]                                                           [address]  [place] [address]                                       [place] [address]  Office- [phone]  Fax- [phone]        Office- [phone]  Fax- [phone]  _________________________________________________________________  Dr. Benito Kevin, JACK Vargas, JUANCARLOS Lowry, [address]ARLOS [address] JACK                                                                                                                                                                   Name:Jose Díaz III     Surgery & Date: 6/4/25    Discharge Date: 6/10/25    MEDICATIONS:  Please refer to your After Visit Summary for your medication list. If you do not have a prescription for a new medication, you may purchase the medication over the counter.   DO NOT TAKE ANY MEDICATIONS THAT ARE NOT ON THIS LIST    INSTRUCTIONS:  NO SMOKING OR TOBACCO PRODUCTS  Follow all the instructions in your discharge book  Shower daily. Wash all incisions twice daily. Once in the shower with Dial soap and water, and once over the sink with Dynahex 4 solution and rinse. Do this for 14 days. No lotions, ointments or powder.  Call the office immediately for any redness, swelling, or drainage from your incision.   Take your temperature daily and call for a temperature of 101 degrees or higher or for any symptoms that make you think you have and infection.  Weigh yourself each morning.  Call if you gain more than 5 pounds in 48 hours.  Use the incentive spirometer 6-8 times a day,

## 2025-06-06 NOTE — ANESTHESIA POSTPROCEDURE EVALUATION
Department of Anesthesiology  Postprocedure Note    Patient: Jose Díaz III  MRN: [id number]  YOB: 1965  Date of evaluation: 6/6/2025    Procedure Summary       Date: 06/04/25 Room / Location: [facility] HEART OR M6 / [facility] OPEN HEART    Anesthesia Start: 0743 Anesthesia Stop: 1610    Procedure: LUPILLO BY DR VARGAS - LEFT ATRIAL APPENDAGE LIGATION WITH  50 MM ATICURE CLIP X 2 - AORTIC VALVE REPLACEMENT WITH 25 MM INSPIRIS RESILIA AORTIC TISSUE VALVE  AND ASCENDING AORTIC ANEURYSM REPAIR WITH 30 MM GETINGE HEMASHIELD PLATINUM GRAFT (Chest) Diagnosis:       Aortic stenosis      Aortic insufficiency      (Aortic stenosis [I35.0])      (Aortic insufficiency [I35.1])    Surgeons: Duglas Soto MD Responsible Provider: Froilan Vargas MD    Anesthesia Type: General ASA Status: 4            Anesthesia Type: General    Alfonso Phase I:      Alfonso Phase II:      Anesthesia Post Evaluation    Patient location during evaluation: ICU  Patient participation: waiting for patient participation  Level of consciousness: sleepy but conscious  Pain score: 4  Airway patency: patent  Nausea & Vomiting: no nausea and no vomiting  Cardiovascular status: vasoactive/inotropes and hemodynamically stable  Respiratory status: acceptable  Hydration status: euvolemic  Multimodal analgesia pain management approach  Pain management: adequate    No notable events documented.

## 2025-06-07 ENCOUNTER — APPOINTMENT (OUTPATIENT)
Facility: HOSPITAL | Age: 60
DRG: 219 | End: 2025-06-07
Attending: THORACIC SURGERY (CARDIOTHORACIC VASCULAR SURGERY)
Payer: COMMERCIAL

## 2025-06-07 LAB
ANION GAP SERPL CALC-SCNC: 4 MMOL/L (ref 2–12)
BASOPHILS # BLD: 0.01 K/UL (ref 0–0.1)
BASOPHILS NFR BLD: 0.1 % (ref 0–1)
BUN SERPL-MCNC: 36 MG/DL (ref 6–20)
BUN/CREAT SERPL: 35 (ref 12–20)
CALCIUM SERPL-MCNC: 8 MG/DL (ref 8.5–10.1)
CHLORIDE SERPL-SCNC: 98 MMOL/L (ref 97–108)
CO2 SERPL-SCNC: 29 MMOL/L (ref 21–32)
CREAT SERPL-MCNC: 1.03 MG/DL (ref 0.7–1.3)
DIFFERENTIAL METHOD BLD: ABNORMAL
EKG ATRIAL RATE: 60 BPM
EKG ATRIAL RATE: 75 BPM
EKG DIAGNOSIS: NORMAL
EKG P AXIS: -71 DEGREES
EKG P AXIS: 61 DEGREES
EKG P-R INTERVAL: 230 MS
EKG P-R INTERVAL: 236 MS
EKG Q-T INTERVAL: 408 MS
EKG Q-T INTERVAL: 424 MS
EKG Q-T INTERVAL: 472 MS
EKG QRS DURATION: 106 MS
EKG QRS DURATION: 118 MS
EKG QRS DURATION: 124 MS
EKG QTC CALCULATION (BAZETT): 455 MS
EKG QTC CALCULATION (BAZETT): 464 MS
EKG QTC CALCULATION (BAZETT): 472 MS
EKG R AXIS: 44 DEGREES
EKG R AXIS: 50 DEGREES
EKG R AXIS: 84 DEGREES
EKG T AXIS: 143 DEGREES
EKG T AXIS: 202 DEGREES
EKG T AXIS: 262 DEGREES
EKG VENTRICULAR RATE: 60 BPM
EKG VENTRICULAR RATE: 72 BPM
EKG VENTRICULAR RATE: 75 BPM
EOSINOPHIL # BLD: 0.04 K/UL (ref 0–0.4)
EOSINOPHIL NFR BLD: 0.4 % (ref 0–7)
ERYTHROCYTE [DISTWIDTH] IN BLOOD BY AUTOMATED COUNT: 14.2 % (ref 11.5–14.5)
GLUCOSE BLD STRIP.AUTO-MCNC: 135 MG/DL (ref 65–117)
GLUCOSE BLD STRIP.AUTO-MCNC: 154 MG/DL (ref 65–117)
GLUCOSE BLD STRIP.AUTO-MCNC: 173 MG/DL (ref 65–117)
GLUCOSE BLD STRIP.AUTO-MCNC: 204 MG/DL (ref 65–117)
GLUCOSE SERPL-MCNC: 109 MG/DL (ref 65–100)
HCT VFR BLD AUTO: 21.7 % (ref 36.6–50.3)
HGB BLD-MCNC: 7.1 G/DL (ref 12.1–17)
IMM GRANULOCYTES # BLD AUTO: 0.14 K/UL (ref 0–0.04)
IMM GRANULOCYTES NFR BLD AUTO: 1.5 % (ref 0–0.5)
LYMPHOCYTES # BLD: 0.92 K/UL (ref 0.8–3.5)
LYMPHOCYTES NFR BLD: 10 % (ref 12–49)
MAGNESIUM SERPL-MCNC: 2.4 MG/DL (ref 1.6–2.4)
MCH RBC QN AUTO: 30.5 PG (ref 26–34)
MCHC RBC AUTO-ENTMCNC: 32.7 G/DL (ref 30–36.5)
MCV RBC AUTO: 93.1 FL (ref 80–99)
MONOCYTES # BLD: 0.9 K/UL (ref 0–1)
MONOCYTES NFR BLD: 9.8 % (ref 5–13)
NEUTS SEG # BLD: 7.19 K/UL (ref 1.8–8)
NEUTS SEG NFR BLD: 78.2 % (ref 32–75)
NRBC # BLD: 0.04 K/UL (ref 0–0.01)
NRBC BLD-RTO: 0.4 PER 100 WBC
PLATELET # BLD AUTO: 74 K/UL (ref 150–400)
PLATELET COMMENT: ABNORMAL
POTASSIUM SERPL-SCNC: 4.8 MMOL/L (ref 3.5–5.1)
RBC # BLD AUTO: 2.33 M/UL (ref 4.1–5.7)
RBC MORPH BLD: ABNORMAL
SERVICE CMNT-IMP: ABNORMAL
SODIUM SERPL-SCNC: 131 MMOL/L (ref 136–145)
WBC # BLD AUTO: 9.2 K/UL (ref 4.1–11.1)

## 2025-06-07 PROCEDURE — 97110 THERAPEUTIC EXERCISES: CPT

## 2025-06-07 PROCEDURE — 6370000000 HC RX 637 (ALT 250 FOR IP): Performed by: PHYSICIAN ASSISTANT

## 2025-06-07 PROCEDURE — 82962 GLUCOSE BLOOD TEST: CPT

## 2025-06-07 PROCEDURE — 80048 BASIC METABOLIC PNL TOTAL CA: CPT

## 2025-06-07 PROCEDURE — 6360000002 HC RX W HCPCS: Performed by: THORACIC SURGERY (CARDIOTHORACIC VASCULAR SURGERY)

## 2025-06-07 PROCEDURE — 85027 COMPLETE CBC AUTOMATED: CPT

## 2025-06-07 PROCEDURE — 2500000003 HC RX 250 WO HCPCS: Performed by: PHYSICIAN ASSISTANT

## 2025-06-07 PROCEDURE — 6370000000 HC RX 637 (ALT 250 FOR IP): Performed by: NURSE PRACTITIONER

## 2025-06-07 PROCEDURE — 6360000002 HC RX W HCPCS: Performed by: NURSE PRACTITIONER

## 2025-06-07 PROCEDURE — 94640 AIRWAY INHALATION TREATMENT: CPT

## 2025-06-07 PROCEDURE — 93005 ELECTROCARDIOGRAM TRACING: CPT | Performed by: NURSE PRACTITIONER

## 2025-06-07 PROCEDURE — 83735 ASSAY OF MAGNESIUM: CPT

## 2025-06-07 PROCEDURE — 97116 GAIT TRAINING THERAPY: CPT

## 2025-06-07 PROCEDURE — 71045 X-RAY EXAM CHEST 1 VIEW: CPT

## 2025-06-07 PROCEDURE — 2060000000 HC ICU INTERMEDIATE R&B

## 2025-06-07 PROCEDURE — 6360000002 HC RX W HCPCS: Performed by: PHYSICIAN ASSISTANT

## 2025-06-07 PROCEDURE — 36415 COLL VENOUS BLD VENIPUNCTURE: CPT

## 2025-06-07 PROCEDURE — 6370000000 HC RX 637 (ALT 250 FOR IP): Performed by: THORACIC SURGERY (CARDIOTHORACIC VASCULAR SURGERY)

## 2025-06-07 PROCEDURE — 6360000002 HC RX W HCPCS: Performed by: INTERNAL MEDICINE

## 2025-06-07 RX ORDER — FUROSEMIDE 10 MG/ML
40 INJECTION INTRAMUSCULAR; INTRAVENOUS 2 TIMES DAILY
Status: DISCONTINUED | OUTPATIENT
Start: 2025-06-07 | End: 2025-06-08

## 2025-06-07 RX ORDER — TAMSULOSIN HYDROCHLORIDE 0.4 MG/1
0.4 CAPSULE ORAL DAILY
Status: DISCONTINUED | OUTPATIENT
Start: 2025-06-07 | End: 2025-06-10 | Stop reason: HOSPADM

## 2025-06-07 RX ORDER — FUROSEMIDE 10 MG/ML
40 INJECTION INTRAMUSCULAR; INTRAVENOUS ONCE
Status: COMPLETED | OUTPATIENT
Start: 2025-06-07 | End: 2025-06-07

## 2025-06-07 RX ADMIN — METHOCARBAMOL TABLETS 750 MG: 750 TABLET, COATED ORAL at 21:34

## 2025-06-07 RX ADMIN — OXYCODONE 5 MG: 5 TABLET ORAL at 00:29

## 2025-06-07 RX ADMIN — ACETAMINOPHEN 1000 MG: 500 TABLET, FILM COATED ORAL at 21:33

## 2025-06-07 RX ADMIN — TAMSULOSIN HYDROCHLORIDE 0.4 MG: 0.4 CAPSULE ORAL at 09:28

## 2025-06-07 RX ADMIN — ARFORMOTEROL TARTRATE 15 MCG: 15 SOLUTION RESPIRATORY (INHALATION) at 09:36

## 2025-06-07 RX ADMIN — MUPIROCIN: 20 OINTMENT TOPICAL at 09:05

## 2025-06-07 RX ADMIN — Medication 400 MG: at 21:33

## 2025-06-07 RX ADMIN — ACETAMINOPHEN 1000 MG: 500 TABLET, FILM COATED ORAL at 16:16

## 2025-06-07 RX ADMIN — ACETAMINOPHEN 1000 MG: 500 TABLET, FILM COATED ORAL at 10:12

## 2025-06-07 RX ADMIN — HYDROMORPHONE HYDROCHLORIDE 0.5 MG: 1 INJECTION, SOLUTION INTRAMUSCULAR; INTRAVENOUS; SUBCUTANEOUS at 16:46

## 2025-06-07 RX ADMIN — FUROSEMIDE 40 MG: 10 INJECTION, SOLUTION INTRAMUSCULAR; INTRAVENOUS at 07:35

## 2025-06-07 RX ADMIN — OXYCODONE 10 MG: 5 TABLET ORAL at 19:43

## 2025-06-07 RX ADMIN — GABAPENTIN 200 MG: 100 CAPSULE ORAL at 21:32

## 2025-06-07 RX ADMIN — MUPIROCIN: 20 OINTMENT TOPICAL at 21:36

## 2025-06-07 RX ADMIN — ACETAMINOPHEN 1000 MG: 500 TABLET, FILM COATED ORAL at 04:24

## 2025-06-07 RX ADMIN — IPRATROPIUM BROMIDE 0.5 MG: 0.5 SOLUTION RESPIRATORY (INHALATION) at 09:36

## 2025-06-07 RX ADMIN — FUROSEMIDE 40 MG: 10 INJECTION, SOLUTION INTRAMUSCULAR; INTRAVENOUS at 16:15

## 2025-06-07 RX ADMIN — IPRATROPIUM BROMIDE 0.5 MG: 0.5 SOLUTION RESPIRATORY (INHALATION) at 20:52

## 2025-06-07 RX ADMIN — ENOXAPARIN SODIUM 30 MG: 100 INJECTION SUBCUTANEOUS at 21:35

## 2025-06-07 RX ADMIN — BUDESONIDE 250 MCG: 0.25 INHALANT RESPIRATORY (INHALATION) at 09:36

## 2025-06-07 RX ADMIN — METHOCARBAMOL TABLETS 750 MG: 750 TABLET, COATED ORAL at 16:16

## 2025-06-07 RX ADMIN — ENOXAPARIN SODIUM 30 MG: 100 INJECTION SUBCUTANEOUS at 09:06

## 2025-06-07 RX ADMIN — POLYETHYLENE GLYCOL 3350 17 G: 17 POWDER, FOR SOLUTION ORAL at 09:05

## 2025-06-07 RX ADMIN — INSULIN LISPRO 2 UNITS: 100 INJECTION, SOLUTION INTRAVENOUS; SUBCUTANEOUS at 09:40

## 2025-06-07 RX ADMIN — SODIUM CHLORIDE, PRESERVATIVE FREE 10 ML: 5 INJECTION INTRAVENOUS at 21:36

## 2025-06-07 RX ADMIN — DOCUSATE SODIUM 50MG AND SENNOSIDES 8.6MG 1 TABLET: 8.6; 5 TABLET, FILM COATED ORAL at 21:34

## 2025-06-07 RX ADMIN — INSULIN LISPRO 2 UNITS: 100 INJECTION, SOLUTION INTRAVENOUS; SUBCUTANEOUS at 21:35

## 2025-06-07 RX ADMIN — DOCUSATE SODIUM 50MG AND SENNOSIDES 8.6MG 1 TABLET: 8.6; 5 TABLET, FILM COATED ORAL at 09:04

## 2025-06-07 RX ADMIN — 0.12% CHLORHEXIDINE GLUCONATE 15 ML: 1.2 RINSE ORAL at 09:04

## 2025-06-07 RX ADMIN — Medication 400 MG: at 09:04

## 2025-06-07 RX ADMIN — METHOCARBAMOL TABLETS 750 MG: 750 TABLET, COATED ORAL at 09:41

## 2025-06-07 RX ADMIN — OXYCODONE 10 MG: 5 TABLET ORAL at 09:02

## 2025-06-07 RX ADMIN — METHOCARBAMOL TABLETS 750 MG: 750 TABLET, COATED ORAL at 12:52

## 2025-06-07 RX ADMIN — ATORVASTATIN CALCIUM 40 MG: 40 TABLET, FILM COATED ORAL at 21:33

## 2025-06-07 RX ADMIN — ARFORMOTEROL TARTRATE 15 MCG: 15 SOLUTION RESPIRATORY (INHALATION) at 20:52

## 2025-06-07 RX ADMIN — ASPIRIN 81 MG: 81 TABLET, CHEWABLE ORAL at 09:04

## 2025-06-07 RX ADMIN — INSULIN LISPRO 2 UNITS: 100 INJECTION, SOLUTION INTRAVENOUS; SUBCUTANEOUS at 12:52

## 2025-06-07 RX ADMIN — SODIUM CHLORIDE, PRESERVATIVE FREE 10 ML: 5 INJECTION INTRAVENOUS at 09:06

## 2025-06-07 RX ADMIN — 0.12% CHLORHEXIDINE GLUCONATE 15 ML: 1.2 RINSE ORAL at 21:34

## 2025-06-07 RX ADMIN — PANTOPRAZOLE SODIUM 40 MG: 40 TABLET, DELAYED RELEASE ORAL at 04:27

## 2025-06-07 RX ADMIN — BUDESONIDE 250 MCG: 0.25 INHALANT RESPIRATORY (INHALATION) at 20:52

## 2025-06-07 RX ADMIN — OXYCODONE 5 MG: 5 TABLET ORAL at 04:24

## 2025-06-07 ASSESSMENT — PAIN SCALES - GENERAL
PAINLEVEL_OUTOF10: 3
PAINLEVEL_OUTOF10: 6
PAINLEVEL_OUTOF10: 8
PAINLEVEL_OUTOF10: 0
PAINLEVEL_OUTOF10: 3
PAINLEVEL_OUTOF10: 6
PAINLEVEL_OUTOF10: 3
PAINLEVEL_OUTOF10: 6
PAINLEVEL_OUTOF10: 3
PAINLEVEL_OUTOF10: 8
PAINLEVEL_OUTOF10: 4
PAINLEVEL_OUTOF10: 2
PAINLEVEL_OUTOF10: 3
PAINLEVEL_OUTOF10: 10
PAINLEVEL_OUTOF10: 6

## 2025-06-07 ASSESSMENT — PAIN DESCRIPTION - ORIENTATION
ORIENTATION: ANTERIOR
ORIENTATION: MID
ORIENTATION: ANTERIOR
ORIENTATION: RIGHT;ANTERIOR
ORIENTATION: MID
ORIENTATION: RIGHT

## 2025-06-07 ASSESSMENT — PAIN DESCRIPTION - ONSET
ONSET: ON-GOING
ONSET: ON-GOING

## 2025-06-07 ASSESSMENT — PAIN DESCRIPTION - DESCRIPTORS
DESCRIPTORS: SORE
DESCRIPTORS: SHARP
DESCRIPTORS: SHARP
DESCRIPTORS: SORE
DESCRIPTORS: SORE;CRUSHING
DESCRIPTORS: SHARP
DESCRIPTORS: ACHING
DESCRIPTORS: SORE;ACHING

## 2025-06-07 ASSESSMENT — PAIN DESCRIPTION - LOCATION
LOCATION: INCISION
LOCATION: INCISION
LOCATION: CHEST;INCISION
LOCATION: CHEST
LOCATION: INCISION
LOCATION: CHEST
LOCATION: INCISION
LOCATION: CHEST;INCISION
LOCATION: INCISION

## 2025-06-07 ASSESSMENT — PAIN - FUNCTIONAL ASSESSMENT
PAIN_FUNCTIONAL_ASSESSMENT: ACTIVITIES ARE NOT PREVENTED

## 2025-06-07 ASSESSMENT — PAIN DESCRIPTION - FREQUENCY: FREQUENCY: INTERMITTENT

## 2025-06-07 ASSESSMENT — PAIN DESCRIPTION - PAIN TYPE
TYPE: SURGICAL PAIN
TYPE: SURGICAL PAIN

## 2025-06-08 ENCOUNTER — APPOINTMENT (OUTPATIENT)
Facility: HOSPITAL | Age: 60
DRG: 219 | End: 2025-06-08
Attending: THORACIC SURGERY (CARDIOTHORACIC VASCULAR SURGERY)
Payer: COMMERCIAL

## 2025-06-08 LAB
ABO + RH BLD: NORMAL
ANION GAP SERPL CALC-SCNC: 3 MMOL/L (ref 2–12)
BASOPHILS # BLD: 0 K/UL (ref 0–0.1)
BASOPHILS NFR BLD: 0 % (ref 0–1)
BLD PROD TYP BPU: NORMAL
BLD PROD TYP BPU: NORMAL
BLOOD BANK DISPENSE STATUS: NORMAL
BLOOD BANK DISPENSE STATUS: NORMAL
BLOOD GROUP ANTIBODIES SERPL: NORMAL
BPU ID: NORMAL
BPU ID: NORMAL
BUN SERPL-MCNC: 30 MG/DL (ref 6–20)
BUN/CREAT SERPL: 40 (ref 12–20)
CALCIUM SERPL-MCNC: 8.1 MG/DL (ref 8.5–10.1)
CHLORIDE SERPL-SCNC: 97 MMOL/L (ref 97–108)
CO2 SERPL-SCNC: 32 MMOL/L (ref 21–32)
CREAT SERPL-MCNC: 0.75 MG/DL (ref 0.7–1.3)
CROSSMATCH RESULT: NORMAL
CROSSMATCH RESULT: NORMAL
DIFFERENTIAL METHOD BLD: ABNORMAL
EOSINOPHIL # BLD: 0.2 K/UL (ref 0–0.4)
EOSINOPHIL NFR BLD: 2 % (ref 0–7)
ERYTHROCYTE [DISTWIDTH] IN BLOOD BY AUTOMATED COUNT: 14.2 % (ref 11.5–14.5)
GLUCOSE BLD STRIP.AUTO-MCNC: 128 MG/DL (ref 65–117)
GLUCOSE BLD STRIP.AUTO-MCNC: 130 MG/DL (ref 65–117)
GLUCOSE BLD STRIP.AUTO-MCNC: 138 MG/DL (ref 65–117)
GLUCOSE BLD STRIP.AUTO-MCNC: 146 MG/DL (ref 65–117)
GLUCOSE SERPL-MCNC: 115 MG/DL (ref 65–100)
HCT VFR BLD AUTO: 23.7 % (ref 36.6–50.3)
HGB BLD-MCNC: 7.6 G/DL (ref 12.1–17)
IMM GRANULOCYTES # BLD AUTO: 0 K/UL (ref 0–0.04)
IMM GRANULOCYTES NFR BLD AUTO: 0 % (ref 0–0.5)
LYMPHOCYTES # BLD: 0.3 K/UL (ref 0.8–3.5)
LYMPHOCYTES NFR BLD: 3 % (ref 12–49)
MAGNESIUM SERPL-MCNC: 2.4 MG/DL (ref 1.6–2.4)
MCH RBC QN AUTO: 30.3 PG (ref 26–34)
MCHC RBC AUTO-ENTMCNC: 32.1 G/DL (ref 30–36.5)
MCV RBC AUTO: 94.4 FL (ref 80–99)
MONOCYTES # BLD: 1.21 K/UL (ref 0–1)
MONOCYTES NFR BLD: 12 % (ref 5–13)
NEUTS BAND NFR BLD MANUAL: 1 %
NEUTS SEG # BLD: 8.39 K/UL (ref 1.8–8)
NEUTS SEG NFR BLD: 82 % (ref 32–75)
NRBC # BLD: 0.13 K/UL (ref 0–0.01)
NRBC BLD-RTO: 1.3 PER 100 WBC
NT PRO BNP: 2244 PG/ML
PLATELET # BLD AUTO: 120 K/UL (ref 150–400)
PMV BLD AUTO: 10.4 FL (ref 8.9–12.9)
POTASSIUM SERPL-SCNC: 4.1 MMOL/L (ref 3.5–5.1)
RBC # BLD AUTO: 2.51 M/UL (ref 4.1–5.7)
RBC MORPH BLD: ABNORMAL
SERVICE CMNT-IMP: ABNORMAL
SODIUM SERPL-SCNC: 132 MMOL/L (ref 136–145)
SPECIMEN EXP DATE BLD: NORMAL
UNIT DIVISION: 0
UNIT DIVISION: 0
WBC # BLD AUTO: 10.1 K/UL (ref 4.1–11.1)

## 2025-06-08 PROCEDURE — 85025 COMPLETE CBC W/AUTO DIFF WBC: CPT

## 2025-06-08 PROCEDURE — 6370000000 HC RX 637 (ALT 250 FOR IP): Performed by: THORACIC SURGERY (CARDIOTHORACIC VASCULAR SURGERY)

## 2025-06-08 PROCEDURE — 6360000002 HC RX W HCPCS: Performed by: INTERNAL MEDICINE

## 2025-06-08 PROCEDURE — 6370000000 HC RX 637 (ALT 250 FOR IP): Performed by: PHYSICIAN ASSISTANT

## 2025-06-08 PROCEDURE — 2500000003 HC RX 250 WO HCPCS: Performed by: PHYSICIAN ASSISTANT

## 2025-06-08 PROCEDURE — 97116 GAIT TRAINING THERAPY: CPT

## 2025-06-08 PROCEDURE — 6360000002 HC RX W HCPCS: Performed by: THORACIC SURGERY (CARDIOTHORACIC VASCULAR SURGERY)

## 2025-06-08 PROCEDURE — 2060000000 HC ICU INTERMEDIATE R&B

## 2025-06-08 PROCEDURE — 83880 ASSAY OF NATRIURETIC PEPTIDE: CPT

## 2025-06-08 PROCEDURE — 2700000000 HC OXYGEN THERAPY PER DAY

## 2025-06-08 PROCEDURE — 80048 BASIC METABOLIC PNL TOTAL CA: CPT

## 2025-06-08 PROCEDURE — 97110 THERAPEUTIC EXERCISES: CPT

## 2025-06-08 PROCEDURE — 94640 AIRWAY INHALATION TREATMENT: CPT

## 2025-06-08 PROCEDURE — 82962 GLUCOSE BLOOD TEST: CPT

## 2025-06-08 PROCEDURE — 6360000002 HC RX W HCPCS: Performed by: NURSE PRACTITIONER

## 2025-06-08 PROCEDURE — 36415 COLL VENOUS BLD VENIPUNCTURE: CPT

## 2025-06-08 PROCEDURE — 71046 X-RAY EXAM CHEST 2 VIEWS: CPT

## 2025-06-08 PROCEDURE — 6370000000 HC RX 637 (ALT 250 FOR IP): Performed by: NURSE PRACTITIONER

## 2025-06-08 PROCEDURE — 83735 ASSAY OF MAGNESIUM: CPT

## 2025-06-08 RX ORDER — FUROSEMIDE 10 MG/ML
20 INJECTION INTRAMUSCULAR; INTRAVENOUS 2 TIMES DAILY
Status: DISCONTINUED | OUTPATIENT
Start: 2025-06-08 | End: 2025-06-10

## 2025-06-08 RX ADMIN — FUROSEMIDE 20 MG: 10 INJECTION, SOLUTION INTRAMUSCULAR; INTRAVENOUS at 09:53

## 2025-06-08 RX ADMIN — SODIUM CHLORIDE, PRESERVATIVE FREE 10 ML: 5 INJECTION INTRAVENOUS at 20:43

## 2025-06-08 RX ADMIN — IPRATROPIUM BROMIDE 0.5 MG: 0.5 SOLUTION RESPIRATORY (INHALATION) at 20:48

## 2025-06-08 RX ADMIN — 0.12% CHLORHEXIDINE GLUCONATE 15 ML: 1.2 RINSE ORAL at 20:50

## 2025-06-08 RX ADMIN — OXYCODONE 10 MG: 5 TABLET ORAL at 19:48

## 2025-06-08 RX ADMIN — ASPIRIN 81 MG: 81 TABLET, CHEWABLE ORAL at 09:53

## 2025-06-08 RX ADMIN — ACETAMINOPHEN 1000 MG: 500 TABLET, FILM COATED ORAL at 11:34

## 2025-06-08 RX ADMIN — ARFORMOTEROL TARTRATE 15 MCG: 15 SOLUTION RESPIRATORY (INHALATION) at 08:30

## 2025-06-08 RX ADMIN — DOCUSATE SODIUM 50MG AND SENNOSIDES 8.6MG 1 TABLET: 8.6; 5 TABLET, FILM COATED ORAL at 09:48

## 2025-06-08 RX ADMIN — BUDESONIDE 250 MCG: 0.25 INHALANT RESPIRATORY (INHALATION) at 08:30

## 2025-06-08 RX ADMIN — ATORVASTATIN CALCIUM 40 MG: 40 TABLET, FILM COATED ORAL at 20:51

## 2025-06-08 RX ADMIN — ENOXAPARIN SODIUM 30 MG: 100 INJECTION SUBCUTANEOUS at 09:46

## 2025-06-08 RX ADMIN — POLYETHYLENE GLYCOL 3350 17 G: 17 POWDER, FOR SOLUTION ORAL at 09:46

## 2025-06-08 RX ADMIN — SODIUM CHLORIDE, PRESERVATIVE FREE 10 ML: 5 INJECTION INTRAVENOUS at 09:48

## 2025-06-08 RX ADMIN — MUPIROCIN: 20 OINTMENT TOPICAL at 20:43

## 2025-06-08 RX ADMIN — IPRATROPIUM BROMIDE 0.5 MG: 0.5 SOLUTION RESPIRATORY (INHALATION) at 08:30

## 2025-06-08 RX ADMIN — ENOXAPARIN SODIUM 30 MG: 100 INJECTION SUBCUTANEOUS at 20:51

## 2025-06-08 RX ADMIN — MUPIROCIN: 20 OINTMENT TOPICAL at 09:48

## 2025-06-08 RX ADMIN — Medication 400 MG: at 09:48

## 2025-06-08 RX ADMIN — ACETAMINOPHEN 1000 MG: 500 TABLET, FILM COATED ORAL at 17:12

## 2025-06-08 RX ADMIN — Medication 400 MG: at 20:51

## 2025-06-08 RX ADMIN — ARFORMOTEROL TARTRATE 15 MCG: 15 SOLUTION RESPIRATORY (INHALATION) at 20:53

## 2025-06-08 RX ADMIN — METHOCARBAMOL TABLETS 750 MG: 750 TABLET, COATED ORAL at 17:09

## 2025-06-08 RX ADMIN — OXYCODONE 10 MG: 5 TABLET ORAL at 04:41

## 2025-06-08 RX ADMIN — BUDESONIDE 250 MCG: 0.25 INHALANT RESPIRATORY (INHALATION) at 20:53

## 2025-06-08 RX ADMIN — PANTOPRAZOLE SODIUM 40 MG: 40 TABLET, DELAYED RELEASE ORAL at 07:15

## 2025-06-08 RX ADMIN — DOCUSATE SODIUM 50MG AND SENNOSIDES 8.6MG 1 TABLET: 8.6; 5 TABLET, FILM COATED ORAL at 20:50

## 2025-06-08 RX ADMIN — GABAPENTIN 200 MG: 100 CAPSULE ORAL at 20:51

## 2025-06-08 RX ADMIN — INSULIN LISPRO 2 UNITS: 100 INJECTION, SOLUTION INTRAVENOUS; SUBCUTANEOUS at 17:21

## 2025-06-08 RX ADMIN — METHOCARBAMOL TABLETS 750 MG: 750 TABLET, COATED ORAL at 09:47

## 2025-06-08 RX ADMIN — ACETAMINOPHEN 1000 MG: 500 TABLET, FILM COATED ORAL at 22:30

## 2025-06-08 RX ADMIN — 0.12% CHLORHEXIDINE GLUCONATE 15 ML: 1.2 RINSE ORAL at 09:48

## 2025-06-08 RX ADMIN — ACETAMINOPHEN 1000 MG: 500 TABLET, FILM COATED ORAL at 04:40

## 2025-06-08 RX ADMIN — METHOCARBAMOL TABLETS 750 MG: 750 TABLET, COATED ORAL at 20:54

## 2025-06-08 RX ADMIN — FUROSEMIDE 20 MG: 10 INJECTION, SOLUTION INTRAMUSCULAR; INTRAVENOUS at 17:12

## 2025-06-08 RX ADMIN — TAMSULOSIN HYDROCHLORIDE 0.4 MG: 0.4 CAPSULE ORAL at 09:47

## 2025-06-08 RX ADMIN — OXYCODONE 10 MG: 5 TABLET ORAL at 13:58

## 2025-06-08 ASSESSMENT — PAIN SCALES - GENERAL
PAINLEVEL_OUTOF10: 6
PAINLEVEL_OUTOF10: 3
PAINLEVEL_OUTOF10: 2
PAINLEVEL_OUTOF10: 7
PAINLEVEL_OUTOF10: 6
PAINLEVEL_OUTOF10: 6
PAINLEVEL_OUTOF10: 2
PAINLEVEL_OUTOF10: 6
PAINLEVEL_OUTOF10: 3
PAINLEVEL_OUTOF10: 7
PAINLEVEL_OUTOF10: 7
PAINLEVEL_OUTOF10: 6

## 2025-06-08 ASSESSMENT — PAIN DESCRIPTION - ORIENTATION
ORIENTATION: ANTERIOR
ORIENTATION: RIGHT
ORIENTATION: ANTERIOR
ORIENTATION: ANTERIOR;MID
ORIENTATION: ANTERIOR;MID
ORIENTATION: ANTERIOR

## 2025-06-08 ASSESSMENT — PAIN DESCRIPTION - LOCATION
LOCATION: INCISION
LOCATION: INCISION
LOCATION: FLANK
LOCATION: CHEST
LOCATION: INCISION
LOCATION: FLANK
LOCATION: FLANK
LOCATION: INCISION
LOCATION: CHEST
LOCATION: INCISION

## 2025-06-08 ASSESSMENT — PAIN DESCRIPTION - DESCRIPTORS
DESCRIPTORS: ACHING
DESCRIPTORS: SORE
DESCRIPTORS: ACHING
DESCRIPTORS: SORE
DESCRIPTORS: SORE

## 2025-06-08 ASSESSMENT — PAIN - FUNCTIONAL ASSESSMENT
PAIN_FUNCTIONAL_ASSESSMENT: ACTIVITIES ARE NOT PREVENTED

## 2025-06-08 ASSESSMENT — PAIN DESCRIPTION - PAIN TYPE: TYPE: SURGICAL PAIN

## 2025-06-09 ENCOUNTER — APPOINTMENT (OUTPATIENT)
Facility: HOSPITAL | Age: 60
DRG: 219 | End: 2025-06-09
Attending: THORACIC SURGERY (CARDIOTHORACIC VASCULAR SURGERY)
Payer: COMMERCIAL

## 2025-06-09 LAB
ANION GAP SERPL CALC-SCNC: 3 MMOL/L (ref 2–12)
BUN SERPL-MCNC: 24 MG/DL (ref 6–20)
BUN/CREAT SERPL: 36 (ref 12–20)
CALCIUM SERPL-MCNC: 8 MG/DL (ref 8.5–10.1)
CHLORIDE SERPL-SCNC: 96 MMOL/L (ref 97–108)
CO2 SERPL-SCNC: 33 MMOL/L (ref 21–32)
CREAT SERPL-MCNC: 0.67 MG/DL (ref 0.7–1.3)
ECHO AV MEAN GRADIENT: 17 MMHG
ECHO AV MEAN VELOCITY: 2 M/S
ECHO AV PEAK GRADIENT: 28 MMHG
ECHO AV PEAK GRADIENT: 28 MMHG
ECHO AV PEAK VELOCITY: 2.7 M/S
ECHO AV PEAK VELOCITY: 2.7 M/S
ECHO AV VTI: 51.9 CM
ECHO BSA: 2.32 M2
ECHO LV EDV A2C: 136 ML
ECHO LV EDV A4C: 191 ML
ECHO LV EDV BP: 170 ML (ref 67–155)
ECHO LV EDV INDEX A4C: 83 ML/M2
ECHO LV EDV INDEX BP: 74 ML/M2
ECHO LV EDV NDEX A2C: 59 ML/M2
ECHO LV EF PHYSICIAN: 50 %
ECHO LV EJECTION FRACTION A2C: 37 %
ECHO LV EJECTION FRACTION A4C: 68 %
ECHO LV EJECTION FRACTION BIPLANE: 55 % (ref 55–100)
ECHO LV ESV A2C: 85 ML
ECHO LV ESV A4C: 62 ML
ECHO LV ESV BP: 77 ML (ref 22–58)
ECHO LV ESV INDEX A2C: 37 ML/M2
ECHO LV ESV INDEX A4C: 27 ML/M2
ECHO LV ESV INDEX BP: 33 ML/M2
ECHO LV FRACTIONAL SHORTENING: 33 % (ref 28–44)
ECHO LV INTERNAL DIMENSION DIASTOLE INDEX: 2.74 CM/M2
ECHO LV INTERNAL DIMENSION DIASTOLIC: 6.3 CM (ref 4.2–5.9)
ECHO LV INTERNAL DIMENSION SYSTOLIC INDEX: 1.83 CM/M2
ECHO LV INTERNAL DIMENSION SYSTOLIC: 4.2 CM
ECHO LV IVSD: 1.2 CM (ref 0.6–1)
ECHO LV MASS 2D: 340.4 G (ref 88–224)
ECHO LV MASS INDEX 2D: 148 G/M2 (ref 49–115)
ECHO LV POSTERIOR WALL DIASTOLIC: 1.2 CM (ref 0.6–1)
ECHO LV RELATIVE WALL THICKNESS RATIO: 0.38
ECHO LVOT AV VTI INDEX: 0.42
ECHO LVOT MEAN GRADIENT: 3 MMHG
ECHO LVOT PEAK GRADIENT: 5 MMHG
ECHO LVOT PEAK VELOCITY: 1.1 M/S
ECHO LVOT VTI: 21.6 CM
ECHO MV A VELOCITY: 0.75 M/S
ECHO MV E DECELERATION TIME (DT): 194.6 MS
ECHO MV E VELOCITY: 1.39 M/S
ECHO MV E/A RATIO: 1.85
ECHO MV LVOT VTI INDEX: 2.01
ECHO MV MAX VELOCITY: 1.9 M/S
ECHO MV MEAN GRADIENT: 5 MMHG
ECHO MV MEAN VELOCITY: 1.1 M/S
ECHO MV PEAK GRADIENT: 14 MMHG
ECHO MV REGURGITANT PEAK GRADIENT: 0 MILLIMETER OF MERCURY COLUMN
ECHO MV REGURGITANT PEAK VELOCITY: 0 M/S
ECHO MV VTI: 43.5 CM
ECHO RV FREE WALL PEAK S': 6.4 CM/S
ECHO RV TAPSE: 1.3 CM (ref 1.7–?)
ECHO TV REGURGITANT MAX VELOCITY: 2.87 M/S
ECHO TV REGURGITANT PEAK GRADIENT: 33 MMHG
ERYTHROCYTE [DISTWIDTH] IN BLOOD BY AUTOMATED COUNT: 14.2 % (ref 11.5–14.5)
GLUCOSE BLD STRIP.AUTO-MCNC: 103 MG/DL (ref 65–117)
GLUCOSE BLD STRIP.AUTO-MCNC: 128 MG/DL (ref 65–117)
GLUCOSE BLD STRIP.AUTO-MCNC: 136 MG/DL (ref 65–117)
GLUCOSE BLD STRIP.AUTO-MCNC: 159 MG/DL (ref 65–117)
GLUCOSE SERPL-MCNC: 106 MG/DL (ref 65–100)
HCT VFR BLD AUTO: 23.3 % (ref 36.6–50.3)
HGB BLD-MCNC: 7.5 G/DL (ref 12.1–17)
MAGNESIUM SERPL-MCNC: 2.3 MG/DL (ref 1.6–2.4)
MCH RBC QN AUTO: 30.2 PG (ref 26–34)
MCHC RBC AUTO-ENTMCNC: 32.2 G/DL (ref 30–36.5)
MCV RBC AUTO: 94 FL (ref 80–99)
NRBC # BLD: 0.13 K/UL (ref 0–0.01)
NRBC BLD-RTO: 1.2 PER 100 WBC
PLATELET # BLD AUTO: 142 K/UL (ref 150–400)
PMV BLD AUTO: 9.9 FL (ref 8.9–12.9)
POTASSIUM SERPL-SCNC: 4 MMOL/L (ref 3.5–5.1)
RBC # BLD AUTO: 2.48 M/UL (ref 4.1–5.7)
SERVICE CMNT-IMP: ABNORMAL
SERVICE CMNT-IMP: NORMAL
SODIUM SERPL-SCNC: 132 MMOL/L (ref 136–145)
WBC # BLD AUTO: 10.9 K/UL (ref 4.1–11.1)

## 2025-06-09 PROCEDURE — 2500000003 HC RX 250 WO HCPCS: Performed by: PHYSICIAN ASSISTANT

## 2025-06-09 PROCEDURE — 97530 THERAPEUTIC ACTIVITIES: CPT

## 2025-06-09 PROCEDURE — 6370000000 HC RX 637 (ALT 250 FOR IP): Performed by: NURSE PRACTITIONER

## 2025-06-09 PROCEDURE — 82962 GLUCOSE BLOOD TEST: CPT

## 2025-06-09 PROCEDURE — 94640 AIRWAY INHALATION TREATMENT: CPT

## 2025-06-09 PROCEDURE — 71045 X-RAY EXAM CHEST 1 VIEW: CPT

## 2025-06-09 PROCEDURE — 97535 SELF CARE MNGMENT TRAINING: CPT

## 2025-06-09 PROCEDURE — 6360000002 HC RX W HCPCS: Performed by: PHYSICIAN ASSISTANT

## 2025-06-09 PROCEDURE — 6370000000 HC RX 637 (ALT 250 FOR IP): Performed by: PHYSICIAN ASSISTANT

## 2025-06-09 PROCEDURE — 6360000002 HC RX W HCPCS: Performed by: INTERNAL MEDICINE

## 2025-06-09 PROCEDURE — 97116 GAIT TRAINING THERAPY: CPT

## 2025-06-09 PROCEDURE — 36415 COLL VENOUS BLD VENIPUNCTURE: CPT

## 2025-06-09 PROCEDURE — 2700000000 HC OXYGEN THERAPY PER DAY

## 2025-06-09 PROCEDURE — 83735 ASSAY OF MAGNESIUM: CPT

## 2025-06-09 PROCEDURE — 6370000000 HC RX 637 (ALT 250 FOR IP): Performed by: THORACIC SURGERY (CARDIOTHORACIC VASCULAR SURGERY)

## 2025-06-09 PROCEDURE — 6360000002 HC RX W HCPCS: Performed by: NURSE PRACTITIONER

## 2025-06-09 PROCEDURE — 2060000000 HC ICU INTERMEDIATE R&B

## 2025-06-09 PROCEDURE — 80048 BASIC METABOLIC PNL TOTAL CA: CPT

## 2025-06-09 PROCEDURE — 93321 DOPPLER ECHO F-UP/LMTD STD: CPT

## 2025-06-09 PROCEDURE — 85027 COMPLETE CBC AUTOMATED: CPT

## 2025-06-09 RX ORDER — GUAIFENESIN 600 MG/1
1200 TABLET, EXTENDED RELEASE ORAL 2 TIMES DAILY
Status: DISCONTINUED | OUTPATIENT
Start: 2025-06-09 | End: 2025-06-10 | Stop reason: HOSPADM

## 2025-06-09 RX ORDER — SPIRONOLACTONE 25 MG/1
25 TABLET ORAL DAILY
Status: DISCONTINUED | OUTPATIENT
Start: 2025-06-09 | End: 2025-06-09

## 2025-06-09 RX ORDER — SPIRONOLACTONE 25 MG/1
12.5 TABLET ORAL DAILY
Status: DISCONTINUED | OUTPATIENT
Start: 2025-06-09 | End: 2025-06-09

## 2025-06-09 RX ORDER — SODIUM PHOSPHATE, DIBASIC AND SODIUM PHOSPHATE, MONOBASIC 7; 19 G/230ML; G/230ML
1 ENEMA RECTAL DAILY PRN
Status: DISCONTINUED | OUTPATIENT
Start: 2025-06-09 | End: 2025-06-10 | Stop reason: HOSPADM

## 2025-06-09 RX ADMIN — 0.12% CHLORHEXIDINE GLUCONATE 15 ML: 1.2 RINSE ORAL at 08:45

## 2025-06-09 RX ADMIN — GABAPENTIN 200 MG: 100 CAPSULE ORAL at 17:33

## 2025-06-09 RX ADMIN — 0.12% CHLORHEXIDINE GLUCONATE 15 ML: 1.2 RINSE ORAL at 21:13

## 2025-06-09 RX ADMIN — GUAIFENESIN 1200 MG: 600 TABLET, MULTILAYER, EXTENDED RELEASE ORAL at 08:42

## 2025-06-09 RX ADMIN — ACETAMINOPHEN 1000 MG: 500 TABLET, FILM COATED ORAL at 20:57

## 2025-06-09 RX ADMIN — SODIUM CHLORIDE, PRESERVATIVE FREE 10 ML: 5 INJECTION INTRAVENOUS at 21:13

## 2025-06-09 RX ADMIN — ARFORMOTEROL TARTRATE 15 MCG: 15 SOLUTION RESPIRATORY (INHALATION) at 08:13

## 2025-06-09 RX ADMIN — SODIUM CHLORIDE, PRESERVATIVE FREE 10 ML: 5 INJECTION INTRAVENOUS at 08:48

## 2025-06-09 RX ADMIN — IPRATROPIUM BROMIDE 0.5 MG: 0.5 SOLUTION RESPIRATORY (INHALATION) at 08:08

## 2025-06-09 RX ADMIN — ENOXAPARIN SODIUM 30 MG: 100 INJECTION SUBCUTANEOUS at 08:44

## 2025-06-09 RX ADMIN — FUROSEMIDE 20 MG: 10 INJECTION, SOLUTION INTRAMUSCULAR; INTRAVENOUS at 08:45

## 2025-06-09 RX ADMIN — ACETAMINOPHEN 1000 MG: 500 TABLET, FILM COATED ORAL at 05:15

## 2025-06-09 RX ADMIN — BUDESONIDE 250 MCG: 0.25 INHALANT RESPIRATORY (INHALATION) at 19:17

## 2025-06-09 RX ADMIN — ACETAMINOPHEN 1000 MG: 500 TABLET, FILM COATED ORAL at 17:35

## 2025-06-09 RX ADMIN — INSULIN LISPRO 2 UNITS: 100 INJECTION, SOLUTION INTRAVENOUS; SUBCUTANEOUS at 18:02

## 2025-06-09 RX ADMIN — Medication 400 MG: at 08:43

## 2025-06-09 RX ADMIN — ASPIRIN 81 MG: 81 TABLET, CHEWABLE ORAL at 08:44

## 2025-06-09 RX ADMIN — OXYCODONE 10 MG: 5 TABLET ORAL at 06:20

## 2025-06-09 RX ADMIN — MUPIROCIN: 20 OINTMENT TOPICAL at 11:04

## 2025-06-09 RX ADMIN — FUROSEMIDE 20 MG: 10 INJECTION, SOLUTION INTRAMUSCULAR; INTRAVENOUS at 17:36

## 2025-06-09 RX ADMIN — ENOXAPARIN SODIUM 30 MG: 100 INJECTION SUBCUTANEOUS at 20:58

## 2025-06-09 RX ADMIN — Medication 400 MG: at 20:55

## 2025-06-09 RX ADMIN — DOCUSATE SODIUM 50MG AND SENNOSIDES 8.6MG 1 TABLET: 8.6; 5 TABLET, FILM COATED ORAL at 20:56

## 2025-06-09 RX ADMIN — GABAPENTIN 200 MG: 100 CAPSULE ORAL at 08:44

## 2025-06-09 RX ADMIN — IPRATROPIUM BROMIDE 0.5 MG: 0.5 SOLUTION RESPIRATORY (INHALATION) at 19:17

## 2025-06-09 RX ADMIN — GUAIFENESIN 1200 MG: 600 TABLET, MULTILAYER, EXTENDED RELEASE ORAL at 20:55

## 2025-06-09 RX ADMIN — GABAPENTIN 200 MG: 100 CAPSULE ORAL at 20:56

## 2025-06-09 RX ADMIN — ATORVASTATIN CALCIUM 40 MG: 40 TABLET, FILM COATED ORAL at 20:55

## 2025-06-09 RX ADMIN — OXYCODONE 10 MG: 5 TABLET ORAL at 20:54

## 2025-06-09 RX ADMIN — PANTOPRAZOLE SODIUM 40 MG: 40 TABLET, DELAYED RELEASE ORAL at 06:21

## 2025-06-09 RX ADMIN — METHOCARBAMOL TABLETS 750 MG: 750 TABLET, COATED ORAL at 20:54

## 2025-06-09 RX ADMIN — METHOCARBAMOL TABLETS 750 MG: 750 TABLET, COATED ORAL at 17:33

## 2025-06-09 RX ADMIN — ARFORMOTEROL TARTRATE 15 MCG: 15 SOLUTION RESPIRATORY (INHALATION) at 19:17

## 2025-06-09 RX ADMIN — EMPAGLIFLOZIN 10 MG: 10 TABLET, FILM COATED ORAL at 11:01

## 2025-06-09 RX ADMIN — TAMSULOSIN HYDROCHLORIDE 0.4 MG: 0.4 CAPSULE ORAL at 08:43

## 2025-06-09 RX ADMIN — BUDESONIDE 250 MCG: 0.25 INHALANT RESPIRATORY (INHALATION) at 08:13

## 2025-06-09 RX ADMIN — METHOCARBAMOL TABLETS 750 MG: 750 TABLET, COATED ORAL at 08:41

## 2025-06-09 ASSESSMENT — PAIN DESCRIPTION - LOCATION
LOCATION: FLANK
LOCATION: INCISION
LOCATION: CHEST

## 2025-06-09 ASSESSMENT — PAIN - FUNCTIONAL ASSESSMENT
PAIN_FUNCTIONAL_ASSESSMENT: ACTIVITIES ARE NOT PREVENTED
PAIN_FUNCTIONAL_ASSESSMENT: PREVENTS OR INTERFERES SOME ACTIVE ACTIVITIES AND ADLS

## 2025-06-09 ASSESSMENT — PAIN SCALES - GENERAL
PAINLEVEL_OUTOF10: 3
PAINLEVEL_OUTOF10: 8
PAINLEVEL_OUTOF10: 8

## 2025-06-09 ASSESSMENT — PAIN DESCRIPTION - ORIENTATION
ORIENTATION: MID
ORIENTATION: RIGHT

## 2025-06-09 ASSESSMENT — PAIN DESCRIPTION - DESCRIPTORS: DESCRIPTORS: ACHING

## 2025-06-10 VITALS
WEIGHT: 245.5 LBS | SYSTOLIC BLOOD PRESSURE: 119 MMHG | HEART RATE: 83 BPM | DIASTOLIC BLOOD PRESSURE: 87 MMHG | HEIGHT: 71 IN | RESPIRATION RATE: 20 BRPM | OXYGEN SATURATION: 93 % | TEMPERATURE: 97.7 F | BODY MASS INDEX: 34.37 KG/M2

## 2025-06-10 DIAGNOSIS — Z95.2 HEART VALVE REPLACED: Primary | ICD-10-CM

## 2025-06-10 LAB
ANION GAP SERPL CALC-SCNC: 4 MMOL/L (ref 2–12)
BUN SERPL-MCNC: 27 MG/DL (ref 6–20)
BUN/CREAT SERPL: 31 (ref 12–20)
CALCIUM SERPL-MCNC: 7.8 MG/DL (ref 8.5–10.1)
CHLORIDE SERPL-SCNC: 97 MMOL/L (ref 97–108)
CO2 SERPL-SCNC: 32 MMOL/L (ref 21–32)
CREAT SERPL-MCNC: 0.86 MG/DL (ref 0.7–1.3)
ERYTHROCYTE [DISTWIDTH] IN BLOOD BY AUTOMATED COUNT: 14.7 % (ref 11.5–14.5)
GLUCOSE BLD STRIP.AUTO-MCNC: 113 MG/DL (ref 65–117)
GLUCOSE SERPL-MCNC: 100 MG/DL (ref 65–100)
HCT VFR BLD AUTO: 22.8 % (ref 36.6–50.3)
HGB BLD-MCNC: 7.3 G/DL (ref 12.1–17)
MAGNESIUM SERPL-MCNC: 2.4 MG/DL (ref 1.6–2.4)
MCH RBC QN AUTO: 30.7 PG (ref 26–34)
MCHC RBC AUTO-ENTMCNC: 32 G/DL (ref 30–36.5)
MCV RBC AUTO: 95.8 FL (ref 80–99)
NRBC # BLD: 0.09 K/UL (ref 0–0.01)
NRBC BLD-RTO: 0.9 PER 100 WBC
PLATELET # BLD AUTO: 198 K/UL (ref 150–400)
PMV BLD AUTO: 9.7 FL (ref 8.9–12.9)
POTASSIUM SERPL-SCNC: 3.9 MMOL/L (ref 3.5–5.1)
RBC # BLD AUTO: 2.38 M/UL (ref 4.1–5.7)
SERVICE CMNT-IMP: NORMAL
SODIUM SERPL-SCNC: 133 MMOL/L (ref 136–145)
WBC # BLD AUTO: 10.2 K/UL (ref 4.1–11.1)

## 2025-06-10 PROCEDURE — 36415 COLL VENOUS BLD VENIPUNCTURE: CPT

## 2025-06-10 PROCEDURE — 80048 BASIC METABOLIC PNL TOTAL CA: CPT

## 2025-06-10 PROCEDURE — 6370000000 HC RX 637 (ALT 250 FOR IP): Performed by: THORACIC SURGERY (CARDIOTHORACIC VASCULAR SURGERY)

## 2025-06-10 PROCEDURE — 6370000000 HC RX 637 (ALT 250 FOR IP): Performed by: PHYSICIAN ASSISTANT

## 2025-06-10 PROCEDURE — 2700000000 HC OXYGEN THERAPY PER DAY

## 2025-06-10 PROCEDURE — 83735 ASSAY OF MAGNESIUM: CPT

## 2025-06-10 PROCEDURE — 97535 SELF CARE MNGMENT TRAINING: CPT

## 2025-06-10 PROCEDURE — 82962 GLUCOSE BLOOD TEST: CPT

## 2025-06-10 PROCEDURE — 6370000000 HC RX 637 (ALT 250 FOR IP): Performed by: NURSE PRACTITIONER

## 2025-06-10 PROCEDURE — 2500000003 HC RX 250 WO HCPCS: Performed by: PHYSICIAN ASSISTANT

## 2025-06-10 PROCEDURE — 94640 AIRWAY INHALATION TREATMENT: CPT

## 2025-06-10 PROCEDURE — 97116 GAIT TRAINING THERAPY: CPT

## 2025-06-10 PROCEDURE — 6360000002 HC RX W HCPCS: Performed by: PHYSICIAN ASSISTANT

## 2025-06-10 PROCEDURE — 85027 COMPLETE CBC AUTOMATED: CPT

## 2025-06-10 RX ORDER — ACETAMINOPHEN 500 MG
1000 TABLET ORAL EVERY 6 HOURS
Qty: 240 TABLET | Refills: 0 | Status: SHIPPED | OUTPATIENT
Start: 2025-06-10 | End: 2025-07-10

## 2025-06-10 RX ORDER — POLYETHYLENE GLYCOL 3350 17 G/17G
17 POWDER, FOR SOLUTION ORAL DAILY PRN
Qty: 14 PACKET | Refills: 0 | Status: SHIPPED | OUTPATIENT
Start: 2025-06-10 | End: 2025-06-24

## 2025-06-10 RX ORDER — FUROSEMIDE 40 MG/1
40 TABLET ORAL DAILY
Qty: 5 TABLET | Refills: 0 | Status: SHIPPED | OUTPATIENT
Start: 2025-06-11 | End: 2025-06-13 | Stop reason: ALTCHOICE

## 2025-06-10 RX ORDER — SENNA AND DOCUSATE SODIUM 50; 8.6 MG/1; MG/1
1 TABLET, FILM COATED ORAL 2 TIMES DAILY
Qty: 28 TABLET | Refills: 0 | Status: SHIPPED | OUTPATIENT
Start: 2025-06-10 | End: 2025-06-24

## 2025-06-10 RX ORDER — GUAIFENESIN 600 MG/1
1200 TABLET, EXTENDED RELEASE ORAL 2 TIMES DAILY
Qty: 56 TABLET | Refills: 0 | Status: SHIPPED | OUTPATIENT
Start: 2025-06-10 | End: 2025-06-24

## 2025-06-10 RX ORDER — ATORVASTATIN CALCIUM 40 MG/1
40 TABLET, FILM COATED ORAL NIGHTLY
Qty: 30 TABLET | Refills: 3 | Status: SHIPPED | OUTPATIENT
Start: 2025-06-10

## 2025-06-10 RX ORDER — TAMSULOSIN HYDROCHLORIDE 0.4 MG/1
0.4 CAPSULE ORAL DAILY
Qty: 30 CAPSULE | Refills: 3 | Status: SHIPPED | OUTPATIENT
Start: 2025-06-11

## 2025-06-10 RX ORDER — FUROSEMIDE 40 MG/1
40 TABLET ORAL DAILY
Status: DISCONTINUED | OUTPATIENT
Start: 2025-06-10 | End: 2025-06-10 | Stop reason: HOSPADM

## 2025-06-10 RX ORDER — OXYCODONE HYDROCHLORIDE 5 MG/1
5 TABLET ORAL EVERY 8 HOURS PRN
Qty: 15 TABLET | Refills: 0 | Status: SHIPPED | OUTPATIENT
Start: 2025-06-10 | End: 2025-06-17

## 2025-06-10 RX ORDER — POTASSIUM CHLORIDE 1500 MG/1
20 TABLET, EXTENDED RELEASE ORAL DAILY
Qty: 5 TABLET | Refills: 0 | Status: SHIPPED | OUTPATIENT
Start: 2025-06-10 | End: 2025-06-13

## 2025-06-10 RX ADMIN — Medication 400 MG: at 08:22

## 2025-06-10 RX ADMIN — ASPIRIN 81 MG: 81 TABLET, CHEWABLE ORAL at 08:22

## 2025-06-10 RX ADMIN — FUROSEMIDE 40 MG: 40 TABLET ORAL at 08:23

## 2025-06-10 RX ADMIN — INSULIN LISPRO 2 UNITS: 100 INJECTION, SOLUTION INTRAVENOUS; SUBCUTANEOUS at 08:00

## 2025-06-10 RX ADMIN — PANTOPRAZOLE SODIUM 40 MG: 40 TABLET, DELAYED RELEASE ORAL at 08:22

## 2025-06-10 RX ADMIN — EMPAGLIFLOZIN 10 MG: 10 TABLET, FILM COATED ORAL at 08:23

## 2025-06-10 RX ADMIN — TAMSULOSIN HYDROCHLORIDE 0.4 MG: 0.4 CAPSULE ORAL at 08:22

## 2025-06-10 RX ADMIN — ARFORMOTEROL TARTRATE 15 MCG: 15 SOLUTION RESPIRATORY (INHALATION) at 09:02

## 2025-06-10 RX ADMIN — 0.12% CHLORHEXIDINE GLUCONATE 15 ML: 1.2 RINSE ORAL at 10:13

## 2025-06-10 RX ADMIN — GABAPENTIN 200 MG: 100 CAPSULE ORAL at 08:23

## 2025-06-10 RX ADMIN — IPRATROPIUM BROMIDE 0.5 MG: 0.5 SOLUTION RESPIRATORY (INHALATION) at 08:55

## 2025-06-10 RX ADMIN — POLYETHYLENE GLYCOL 3350 17 G: 17 POWDER, FOR SOLUTION ORAL at 08:24

## 2025-06-10 RX ADMIN — DOCUSATE SODIUM 50MG AND SENNOSIDES 8.6MG 1 TABLET: 8.6; 5 TABLET, FILM COATED ORAL at 08:23

## 2025-06-10 RX ADMIN — GUAIFENESIN 1200 MG: 600 TABLET, MULTILAYER, EXTENDED RELEASE ORAL at 08:23

## 2025-06-10 RX ADMIN — BUDESONIDE 250 MCG: 0.25 INHALANT RESPIRATORY (INHALATION) at 09:02

## 2025-06-10 RX ADMIN — SODIUM CHLORIDE, PRESERVATIVE FREE 10 ML: 5 INJECTION INTRAVENOUS at 08:25

## 2025-06-10 RX ADMIN — OXYCODONE 10 MG: 5 TABLET ORAL at 08:23

## 2025-06-10 RX ADMIN — OXYCODONE 5 MG: 5 TABLET ORAL at 04:43

## 2025-06-10 RX ADMIN — METHOCARBAMOL TABLETS 750 MG: 750 TABLET, COATED ORAL at 08:23

## 2025-06-10 RX ADMIN — ACETAMINOPHEN 1000 MG: 500 TABLET, FILM COATED ORAL at 04:43

## 2025-06-10 ASSESSMENT — PAIN DESCRIPTION - ONSET
ONSET: ON-GOING
ONSET: ON-GOING

## 2025-06-10 ASSESSMENT — PAIN SCALES - GENERAL
PAINLEVEL_OUTOF10: 4
PAINLEVEL_OUTOF10: 6
PAINLEVEL_OUTOF10: 9
PAINLEVEL_OUTOF10: 5
PAINLEVEL_OUTOF10: 3

## 2025-06-10 ASSESSMENT — PAIN DESCRIPTION - ORIENTATION
ORIENTATION: MID
ORIENTATION: MID
ORIENTATION: ANTERIOR
ORIENTATION: ANTERIOR

## 2025-06-10 ASSESSMENT — PAIN DESCRIPTION - DESCRIPTORS
DESCRIPTORS: ACHING
DESCRIPTORS: SORE
DESCRIPTORS: SORE
DESCRIPTORS: ACHING

## 2025-06-10 ASSESSMENT — PAIN DESCRIPTION - LOCATION
LOCATION: INCISION
LOCATION: CHEST;INCISION
LOCATION: CHEST;INCISION
LOCATION: INCISION

## 2025-06-10 ASSESSMENT — PAIN DESCRIPTION - FREQUENCY: FREQUENCY: INTERMITTENT

## 2025-06-10 ASSESSMENT — PAIN DESCRIPTION - PAIN TYPE
TYPE: SURGICAL PAIN
TYPE: SURGICAL PAIN
TYPE: ACUTE PAIN;SURGICAL PAIN

## 2025-06-10 NOTE — CARE COORDINATION
Pt clear from CM standpoint.    Transition of Care Plan:    RUR: 17% \"medium risk\"  Prior Level of Functioning: Independent with ADLs  [facility]   [facility]: [facility]  If SNF or IPR: Date FOC offered: N/A  Follow up appointments: PCP/Specialists as indicated  DME needed: RW - [facility]   Transportation at discharge: Spouse to transport   IM/IMM Medicare/ letter given: N/A  Is patient a [place] and connected with [facility]? No  Caregiver Contact: Jacqueline Díaz (spouse), [phone]  Discharge Caregiver contacted prior to discharge? To be contacted  Care Conference needed? Not at this time   Barriers to discharge: None    0949 AM: CM received HH acceptance from [facility] . CM to make pt aware and place info on AVS.    0905 AM: Chart reviewed. CM acknowledged d/c order. CM delivered RW to bedside. HH referrals remain pending due to pt service area. CM to follow up with pt if HH acceptance is received.      06/10/25 0907   Services At/After Discharge   Transition of Care Consult (CM Consult) Discharge Planning   Services At/After Discharge DME    Resource Information Provided? No   Mode of Transport at Discharge Other (see comment)  (Spouse)   Hospital Transport Time of Discharge 1100   Confirm Follow Up Transport Family   Condition of Participation: Discharge Planning   The Plan for Transition of Care is related to the following treatment goals: Return home with spouse   The Patient and/or Patient Representative was provided with a Choice of Provider? Patient   The Patient and/Or Patient Representative agree with the Discharge Plan? Yes     MARCUS Leblanc  Care Management  [facility]   [phone]  
Transition of Care Plan:    RUR: 17% \"medium risk\"  Prior Level of Functioning: Independent with ADLs  Disposition: Home with spouse and HH (referrals pending)  CHESTER: 6/10  If SNF or IPR: Date FOC offered: N/A  Follow up appointments: PCP/Specialists as indicated  DME needed: RW - [facility]   Transportation at discharge: Spouse to transport   IM/IMM Medicare/ letter given: N/A  Is patient a [place] and connected with [facility]? No  Caregiver Contact: Jacqueline Díaz (spouse), [phone]  Discharge Caregiver contacted prior to discharge? To be contacted  Care Conference needed? Not at this time   Barriers to discharge: CTS    1357 PM: CM received RW acceptance from [facility]. CM to deliver to bedside upon time of d/c.    1057 AM: Chart reviewed. CM following for d/c planning. Pt to return home with pt spouse. HH referrals remain pending at this time. CM acknowledged pt need for RW. RW order placed to [facility].     MARCUS Leblanc  Care Management  [facility]   [phone]      
Transition of Care Plan:    RUR: 18% \"medium risk\"  Prior Level of Functioning: Independent with ADLs  Disposition: Home with spouse and HH (referrals pending)  CHESTER: 6/9  If SNF or IPR: Date FOC offered: N/A  Follow up appointments: PCP/Specialists as indicated  DME needed: None  Transportation at discharge: Spouse to transport   IM/IMM Medicare/ letter given: N/A  Is patient a  and connected with VA? No  Caregiver Contact: Jacqueline Ortegaant (spouse), [phone]  Discharge Caregiver contacted prior to discharge? To be contacted  Care Conference needed? Not at this time   Barriers to discharge: CTS    1536 PM: Chart reviewed. CM following for d/c planning. Pt to return home with pt spouse. Pt spouse to transport pt home. CM to discuss HH recommendation with pt and send referrals.    MARCUS Leblanc  Care Management  [facility]   [phone]      
(Non-Billable)    MANDO REYES   [phone]

## 2025-06-10 NOTE — PROCEDURES
PROCEDURE NOTE  Date: 6/10/2025   Name: Jose Díaz III  YOB: 1965    Procedures    Pt assisted back to bed by RN. V wire site cleansed with CHG prep. V wired pulled without issue. Vitals changed to Q15min. Pt educated on 1 hour bedrest. RN updated. VSS.     Jasmyne Oconnell, APRN - NP

## 2025-06-10 NOTE — PROGRESS NOTES
[facility]      Name: Jose Díaz III   : 1965   MRN: [id number]   Date: 2025      Brief patient summary: 59 M with PMH of COPD and severe aortic stenosis with dilatation of the ascending thoracic aorta. Admitted  to the CVICU after planned aortic valve replacement and resection of ascending aortic aneurysm.  Intraoperatively, he was noted to have biventricular dilatation and severe left ventricular hypertrophy.  He had some difficulty coming off of cardiopulmonary bypass.  He required multiple vasopressors to stabilize his blood pressure.  He initially had significant oozing from the mediastinal tubes for which he received TXA, platelets and FFP.  Upon arrival to CVICU he was on dobutamine, epinephrine, norepinephrine, phenylephrine, vasopressin.  In the early interval after arrival to CVICU he was rapidly weaned down on all vasopressors and at the time of this dictation is on dobutamine 5 mcg/kg/min, epinephrine 4 mcg/min, norepinephrine 10 mcg/min, vasopressin 0.03 units/min.    Significant PMH: COPD, CHF, aortic stenosis.  Also documented is alcohol use and cocaine use - no further details known.    LHC: 2025 severe aortic stenosis with mean gradient of 75 mmHg.  No significant coronary artery disease.  LVEDP 34 mmHg    Echocardiogram: 2025    Left Ventricle: Reduced left ventricular systolic function with a visually estimated EF of 35 - 40%. Left ventricle size is normal. Moderately increased wall thickness. Global hypokinesis present.    Right Ventricle: Right ventricle size is normal. Normal systolic function.    Aortic Valve: Not well visualized. Calcified cusps. Mild regurgitation. Severe stenosis of the aortic valve by provided gradient. AV mean gradient is 68 mmHg. AV area by continuity VTI is 0.7 cm2.    Left Atrium: Left atrium is dilated.    Right Atrium: Right atrium is dilated.    Underwent AVR and aortic aneurysm resection by Dr Soto on 25 and was 
        [facility]      Name: Jose Díaz III   : 1965   MRN: [id number]   Date: 2025      Brief patient summary: 59 M with PMH of COPD and severe aortic stenosis with dilatation of the ascending thoracic aorta. Admitted  to the CVICU after planned aortic valve replacement and resection of ascending aortic aneurysm.  Intraoperatively, he was noted to have biventricular dilatation and severe left ventricular hypertrophy.  He had some difficulty coming off of cardiopulmonary bypass.  He required multiple vasopressors to stabilize his blood pressure.  He initially had significant oozing from the mediastinal tubes for which he received TXA, platelets and FFP.  Upon arrival to CVICU he was on dobutamine, epinephrine, norepinephrine, phenylephrine, vasopressin.  In the early interval after arrival to CVICU he was rapidly weaned down on all vasopressors and at the time of this dictation is on dobutamine 5 mcg/kg/min, epinephrine 4 mcg/min, norepinephrine 10 mcg/min, vasopressin 0.03 units/min.    Significant PMH: COPD, CHF, aortic stenosis.  Also documented is alcohol use and cocaine use - no further details known.    LHC: 2025 severe aortic stenosis with mean gradient of 75 mmHg.  No significant coronary artery disease.  LVEDP 34 mmHg    Echocardiogram: 2025    Left Ventricle: Reduced left ventricular systolic function with a visually estimated EF of 35 - 40%. Left ventricle size is normal. Moderately increased wall thickness. Global hypokinesis present.    Right Ventricle: Right ventricle size is normal. Normal systolic function.    Aortic Valve: Not well visualized. Calcified cusps. Mild regurgitation. Severe stenosis of the aortic valve by provided gradient. AV mean gradient is 68 mmHg. AV area by continuity VTI is 0.7 cm2.    Left Atrium: Left atrium is dilated.    Right Atrium: Right atrium is dilated.    Underwent AVR and aortic aneurysm resection by Dr Soto on 25 and was 
     The patient was seen on multidisciplinary rounds with the cardiac surgery team.  He is progressing very well postoperatively.  He is to be downgraded to intermediate level of care.  [facility] will sign off.  Please call if we can be of further assistance.    Rafa Shabazz MD  [facility] Critical Care  [facility] 4th floor [facility] phone: [phone]  [facility] 7th floor [facility] phone: [phone]  [facility] phone: [phone]  6/6/2025     
  1130:CT removed by AYLEEN Mathew.  1256 Ba removed  1630 Incision care completed.  CVICU End of Shift Note    Bedside shift change report given to Tiesha RN (oncoming nurse) by Olga Jason RN (offgoing nurse).  Report included the following information SBAR, Procedure Summary, Intake/Output, MAR, Cardiac Rhythm SR/Afib, Alarm Parameters , Procedure Verification, Quality Measures, and Dual Neuro Assessment    Shift worked:  [id number]     Shift summary and any significant changes:     Discharge postponed due to social issues  is doing better. Ambulates in hallway.In and out of Afib.none at this time     Concerns for physician to address:  None at this time       Activity:  Level of Assistance: Minimal assist, patient does 75% or more  Number times ambulated in hallways past shift: 2  Number of times OOB to chair past shift: 3    Neurologic:  Level of Consciousness: Alert (0)  Orientation Level: Oriented X4  Cognition: Follows commands, Appropriate for developmental age, Appropriate attention/concentration, Appropriate safety awareness, Appropriate judgement  Speech: Clear    Cardiac:   Cardiac Rhythm: Sinus rhythm    Pacer Wires: Pacer Wires: Removed               Respiratory:   Room air  ISS Teaching YES   Use : YES     Volume: 750 ml  Incentive Spirometry Tx  Treatment Effort: Needs encouragement  Predicted Volume: 2500  Maximum Achieved Volume (mL): 1250 mL  Number of Breaths: 10     Genitourinary:   Urinary Status: Voiding    GI:  Last BM (including prior to admit): 06/06/25  Current diet:  ADULT ORAL NUTRITION SUPPLEMENT; Breakfast, Dinner; Low Calorie/High Protein Oral Supplement  ADULT DIET; Regular; 4 carb choices (60 gm/meal); Low Fat/Low Chol/High Fiber/LUISITO; No Concentrated sweets  Passing flatus: YES    Lines/drains/airway:  Peripheral IV    Peripheral IV x 2: NO        Skin:    Wound Care Documentation:         Po Scale Score: 19  Interventions: Wound Offloading (Prevention Methods): Bed, 
0700  Bedside and verbal report from Marshal Resendiz RN.  Patient up in chair at the bedside.   0800  Assessment completed. JUANCARLOS Hernandez here to see patient.  0900  Took diet well.  0930  Dr. Soto and heart team, and Dr. Shabazz here to see patient. Repeat K+ sent to lab.  1000  Arterial line and PA line removed.  1100  To bathroom, passed one med sized soft brown stool.  1130  PIV #20 IV cath started via right upper forearm using ultrasound - inserted by Jaden Casillas RN  1145  PT and OT with patient. Walked in hallway.   1200  Reassessment completed.  1300  Took diet fair. Wife now at the bedside.  1442  Lantus insulin 16 units SQ given for transition off insulin drip.   1600  Reassessment completed. Assisted to bathroom, passed one small brown stool. Then back to chair. Wife at bedside.   1705  Insulin drip stopped per protocol.  1800  Remains up in the chair, napping at intervals. K+ (1600) now 5.0. JUANCARLOS Hernandez notified - no changes made at this time.   1900  Bedside and verbal report given to Desiree Cruz RN  
0900 echo done at bedside.  
CSS FLOOR Progress Note    Admit Date: 2025  POD: 5 Days Post-Op      Procedure:    LUPILLO BY DR SANZ - LEFT ATRIAL APPENDAGE LIGATION WITH 50 MM ATICURE CLIP X 2 - AORTIC VALVE REPLACEMENT WITH 25 MM INSPIRIS RESILIA AORTIC TISSUE VALVE AND ASCENDING AORTIC ANEURYSM REPAIR WITH 30 MM GETINGE HEMASHIELD PLATINUM GRAFT     Subjective/Interval Event:   Pt seen with Dr. Soto. Pt sitting up in the chair. Feeling fair. Reports he is trying to help with his oxygen but he is still requiring NC.     On 4L NC. Afebrile. Off gtts.     Objective:     /72   Pulse 63   Temp 98.3 °F (36.8 °C) (Oral)   Resp 15   Ht 1.803 m (5' 11\")   Wt 110.9 kg (244 lb 8 oz)   SpO2 (!) 86%   BMI 34.10 kg/m²   Temp (24hrs), Av.4 °F (36.9 °C), Min:98.3 °F (36.8 °C), Max:98.6 °F (37 °C)      Last 24hr Input/Output:    Intake/Output Summary (Last 24 hours) at 2025 0826  Last data filed at 2025 0617  Gross per 24 hour   Intake 830 ml   Output 1175 ml   Net -345 ml        EKG/Rhythm: SR     CXR: Xray Result (most recent):  XR CHEST PORTABLE 2025    Narrative  INDICATION: Increased O2 requirements    COMPARISON:  2025    FINDINGS:    AP portable view of the chest demonstrates prior median sternotomy,  cardiomegaly, and left atrial appendage clip. Increasing right basilar  atelectasis. Trace pleural effusions. No pulmonary edema or pneumothorax. The  osseous structures are unremarkable.    Impression  Increasing right basilar atelectasis. Trace bilateral pleural effusions.    Electronically signed by Manuel Marr    Admission Weight: Last Weight   Weight - Scale: 107.3 kg (236 lb 8.9 oz) Weight - Scale: 110.9 kg (244 lb 8 oz)     EXAM:  General: WDWN man in NAD sitting in chair.    Lungs:   Coarse, rhonchi.    Incision:  No erythema, drainage or swelling.   Heart:  Regular rate and rhythm.  + 2/6 ABIGAIL loudest left mid axillary  line.     Abdomen:   Soft, non-tender. Obese. Bowel sounds normal. No masses,  No 
Cardiac Surgery Care Coordinator met with Jose Díaz III. Reviewed plan of care and discussed goals for the day. Jose Díaz III has a good understanding of his plan for the day.  Reinforced sternal precautions and encouraged continued use of the incentive spirometer.  Jose Díaz III can pull 1000ml. Encouraged Jose Díaz III to verbalize.   Will continue to follow for educational and emotional needs.  Janay Adams RN    
Cardiac Surgery Care Coordinator-  Met with Jose Díaz III. Reviewed plan of care and discharge instructions. Reinforced move in the tube sternal precautions and continued use of the incentive spirometer.  Jose Díaz III is able to pull 2250cc.  Reviewed the importance of daily temp and weight monitoring, discussed incisional care and reviewed signs and symptoms of infection.  Red reminder bracelet on right wrist.  Reviewed purpose of the bracelet and when to call the MD. Using the teach back method reviewed medications.  Reminded pt of appts and encouraged participation in the Cardiac Wellness and rehab program after discharge.  Encouraged Jose Díaz III to verbalize and emotional support given.  Jose Díaz III is without questions or concerns at this time. Will follow up with a phone call after discharge. Janay Adams RN    
Cardiac Surgery Care Coordinator- Met with Jose Díaz III.  Reviewed plan of care and discussed upcoming discharge date.  Reinforced sternal precautions and encouraged continued use of the incentive spirometer. Began discharge teaching and encouraged him to verbalize.  Reviewed goals for the day and discussed the  importance of increased activity and continued activity after discharge.  Will continue to follow for educational and emotional needs.  Janay Adams RN    
Cardiac Surgery Care Coordinator- Met with Jose Díaz III. Reviewed plan of care.  Reinforced sternal precautions and encouraged continued use of the incentive spirometer. Reviewed goals for the day and emphasized the importance of increased activity to meet discharge goals.  Will continue to follow for educational and emotional needs. Janay Adams RN    
Cardiac Surgery Coordinator called the family of Jose Díaz III, introduced role of the Cardiac Surgery Care Coordinator.  Reviewed plan of care and day of surgery expectations. Provided family with an update from OR.  Encouraged family to verbalize and emotional support given.  Will continue to update throughout the day.    0935 - Called the family of Jose Díaz III, provided family with update of on by-pass.  Family without questions or concerns at this time.  Will continue to follow for educational and emotional needs.     1105 - Called the family of Jose Díaz III, provided family with update of rewarming.  Family without questions or concerns at this time.  Will continue to follow for educational and emotional needs.     1150 - Called the family of Jose Díaz III, provided family with update of off by-pass.  Family without questions or concerns at this time.  Will continue to follow for educational and emotional needs. Janay Adams RN    7486 - Dr. Soto called and spoke to wife.  Update given.  Family is out of the hospital and will visit when pt is extubated. Janay Adams RN    
Cardiac Surgery ICU Progress Note    Admit Date: 2025  POD:  1 Day Post-Op    Procedure:  Procedure(s):  LUPILLO BY DR SANZ - LEFT ATRIAL APPENDAGE LIGATION WITH  50 MM ATICURE CLIP X 2 - AORTIC VALVE REPLACEMENT WITH 25 MM INSPIRIS RESILIA AORTIC TISSUE VALVE  AND ASCENDING AORTIC ANEURYSM REPAIR WITH 30 MM GETINGE HEMASHIELD PLATINUM GRAFT        Subjective/Interval Events:   Patient seen with Dr. Stoo. Pt up in chair. Feeling fair.     Vaso and Cecil weaned off overnight.     On 15L NC with O2 sat 97%. Afebrile. On Epi @ 4, Dobuta @ 5, and Levo @ 7. Dobutamine decreased to 2.5 during rounds per Dr. Soto.     Minimal CT dump when he got OOB this am to get to chair.      Objective:   Vitals:  Blood pressure 120/69, pulse 76, temperature 99.9 °F (37.7 °C), resp. rate 23, height 1.803 m (5' 11\"), weight 110.3 kg (243 lb 2.7 oz), SpO2 97%.  Temp (24hrs), Av.4 °F (37.4 °C), Min:98.1 °F (36.7 °C), Max:100 °F (37.8 °C)    Oxygen Flow Rate: O2 Flow Rate (L/min): 15 L/min    Oxygen Delivery Method: O2 Device: High flow nasal cannula    Mulhall-Jose  Mulhall-Jose  Core (Body) Temperature: 99 °F (37.2 °C)  MAP (Monitor): 79  PAP (Systolic/Diastolic): 55/24  PAP (Mean): 33 mmHg  PAP Wave Form: Dampened  CVP (mmHg): 10 mmHg  CVP (Mean): 14 mmHg  SVO2 (%): 52 %  CO (l/min): 7 l/min  CI (l/min/m2): 3.1 l/min/m2  SVR (dyne*sec)/cm5: 735 (dyne*sec)/cm5  UZAIR: 2.21  : 0.96     EKG/Rhythm:  Accelerated Junctional     Extubation Date / Time: 25 at 1840    F2F: 25 at 2230    CT Output: 586mL ( 400mL last 12hrs)    CXR: Xray Result (most recent):  XR CHEST PORTABLE 2025    Narrative  EXAM:  XR CHEST PORTABLE    INDICATION: Post op open heart surgery    COMPARISON: 2025    TECHNIQUE: portable chest AP view    FINDINGS: Heart size is enlarged but stable status post median sternotomy. ET  tube and gastric tube have been removed. PA catheter appears stable. Chest and  mediastinal tubes are stable. There is no 
Occupational Therapy    Duplicate OT orders received and acknowledged. Will continue POC as outlined in initial evaluation.    Rupal Hawkins, OTR/L  
Occupational Therapy note:    Order acknowledged and chart reviewed. Patient s/p AVR and AAA repair POD 0. Will defer OT consult and follow up POD 1 per guidelines.    Rupal Hawkins, OTR/L  
Order acknowledged and chart reviewed. Patient s/p AVR and AAA repair POD 0. Will defer PT consult and follow up POD 1 per guidelines. Thank you    Fior Prabhakar, PT, DPT     
PAT Instructions     PAT labs: type and screen, crossmatch 2 units keep ahead 2 units    PAT other tests: PA/lat CXR, EKG    Medications to hold prior to surgery:  Spironolactone - last dose on 6/1/25    Medications to start prior to surgery:  Mupirocin nares BID starting 6/2/25   Peridex oral BID starting 6/2/25  Amiodarone PO BID starting 5/30/25    Nutrition/Supplement:   Ensure High Protein BID starting 5/30/25  Gatorade 28 oz at 10pm night prior to surgery   No solids after midnight. Clear liquids allowed up to 1 hour prior to arrival (3 hrs prior to surgery).   Gatorade 14oz at 4am morning of surgery. NPO thereafter.     BENJAMIN Delgadillo - NP   
Pharmacy Medication History Note    The patient was interviewed regarding current PTA medication list, use and drug allergies;  no one was present in room. The patient was questioned regarding use of any other inhalers, topical products, over the counter medications, herbal medications, vitamin products or ophthalmic/nasal/otic medication use.     Allergy Update: Patient has no known allergies.    Recommendations/Findings:   The following amendments were made to the patient's active medication list on file at [facility]:   1) Additions: none    2) NOT TAKING: Per patient, he was taking trelegy and his rescue albuterol.  Two weeks ago he was admitted in a hospital and was started on all the other meds (except amiodarone, mupirocin and chlorhexidine)     3) Changes: none      Pertinent Findings: Patient stated that he doesn't need the Pantoprazole.  It was prescribed recently.  He was recently admitted at a hospital and was started on ASA, spironolactone, Pantoprazole, and amiodarone.  All these meds were prescribed on 5/20/2025    Clarified PTA med list with rx query and patient. PTA medication list was corrected to the following:     Prior to Admission Medications   Prescriptions Last Dose Informant   albuterol sulfate HFA (PROVENTIL;VENTOLIN;PROAIR) 108 (90 Base) MCG/ACT inhaler Past Week    Sig: Inhale 2 puffs into the lungs every 4 hours as needed for Wheezing or Shortness of Breath   amiodarone (PACERONE) 100 MG tablet 6/4/2025 Morning    Sig: Take 4 tablets by mouth 2 times daily for 5 days   aspirin 81 MG chewable tablet 6/3/2025    Sig: Take 1 tablet by mouth daily   chlorhexidine (PERIDEX) 0.12 % solution 6/4/2025 at  4:00 AM    Sig: Swish and spit 15 mLs 2 times daily for 2 days   fluticasone-umeclidin-vilant (TRELEGY ELLIPTA) 100-62.5-25 MCG/ACT AEPB inhaler 6/4/2025 at  4:00 AM    Sig: Inhale 1 puff into the lungs daily   mupirocin (BACTROBAN) 2 % ointment 6/4/2025 at  4:00 AM    Sig: by Each Nostril route 2 
Problem: Physical Therapy - Adult  Goal: By Discharge: Performs mobility at highest level of function for planned discharge setting.  See evaluation for individualized goals.  Description: FUNCTIONAL STATUS PRIOR TO ADMISSION: Patient was independent and active without use of DME. Had been working up to 7 days a week as an  until recent health issues. ETOH use and cocaine use history.    HOME SUPPORT PRIOR TO ADMISSION: The patient lived with spouse.    Physical Therapy Goals  Initiated 6/5/2025  1.  Patient will move from supine to sit and sit to supine, scoot up and down, and roll side to side in bed with modified independence within 5 day(s).    2.  Patient will perform sit to stand with modified independence within 5 day(s).  3.  Patient will transfer from bed to chair and chair to bed with modified independence using the least restrictive device within 5 day(s).  4.  Patient will ambulate with modified independence for 200 feet with the least restrictive device within 5 day(s).   5.  Patient will perform cardiac exercises per protocol with modified independence within 5 days.  6.  Patient will verbally recall and functionally demonstrate mindful-based movements (\"move in the tube\") principles without cues within 5 days.  Outcome: Progressing    PHYSICAL THERAPY TREATMENT    Patient: Jose Díaz III (59 y.o. male)  Date: 6/10/2025  Diagnosis: Aortic stenosis [I35.0]  Aortic insufficiency [I35.1]  Severe aortic stenosis [I35.0] Severe aortic stenosis  Procedure(s) (LRB):  LUPILLO BY DR SANZ - LEFT ATRIAL APPENDAGE LIGATION WITH  50 MM ATICURE CLIP X 2 - AORTIC VALVE REPLACEMENT WITH 25 MM INSPIRIS RESILIA AORTIC TISSUE VALVE  AND ASCENDING AORTIC ANEURYSM REPAIR WITH 30 MM GETINGE HEMASHIELD PLATINUM GRAFT (N/A) 6 Days Post-Op  Precautions: Restrictions/Precautions  Restrictions/Precautions: Cardiac     Position Activity Restriction  Sternal Precautions: 5# Lifting Restrictions, Move in the 
Shift report received from Olga STOKES     Shift assessment complete, see flow sheet     0000: Reassessment complete, no new changes documented     0400: Reassessment complete, no new changes documented     0633: On call paged for CTS. O2 increased to 6 LNC from 1 LNC, sats 91%. Patient denies dyspnea. Spoke with ANDRÉS NP, verbal order for a STAT CXR    Shift report given to RN   
Spiritual Health History and Assessment/Progress Note  [facility]    Initial Encounter,  ,  ,      Name: Jose Díaz III MRN: [id number]    Age: 59 y.o.     Sex: male   Language: English   Roman Catholic: Unknown   Severe aortic stenosis     Date: 6/6/2025            Total Time Calculated: 10 min              Spiritual Assessment began in [facility] CVICU        Referral/Consult From: Rounding   Encounter Overview/Reason: Initial Encounter  Service Provided For: Patient and family together    Celeste, Belief, Meaning:   Patient is connected with a celeste tradition or spiritual practice and has beliefs or practices that help with coping during difficult times  Family/Friends are connected with a celeste tradition or spiritual practice and have beliefs or practices that help with coping during difficult times      Importance and Influence:  Patient has spiritual/personal beliefs that influence decisions regarding their health  Family/Friends have spiritual/personal beliefs that influence decisions regarding the patient's health    Community:  Patient feels well-supported. Support system includes: Spouse/Partner, Friends, and Extended family  Family/Friends feel well-supported. Support system includes: Friends and Extended family    Assessment and Plan of Care:     Patient Interventions include: Facilitated expression of thoughts and feelings and Affirmed coping skills/support systems  Family/Friends Interventions include: Facilitated expression of thoughts and feelings and Affirmed coping skills/support systems    Patient Plan of Care: Spiritual Care available upon further referral  Family/Friends Plan of Care: Spiritual Care available upon further referral    Electronically signed by Marshal Marin  Intern on 6/6/2025 at 3:50 PM    
\[facility]\"" FLOOR Progress Note    Admit Date: 2025  POD: 4 Days Post-Op      Procedure:    LUPILLO BY DR SANZ - LEFT ATRIAL APPENDAGE LIGATION WITH 50 MM ATICURE CLIP X 2 - AORTIC VALVE REPLACEMENT WITH 25 MM INSPIRIS RESILIA AORTIC TISSUE VALVE AND ASCENDING AORTIC ANEURYSM REPAIR WITH 30 MM GETINGE HEMASHIELD PLATINUM GRAFT   Subjective:   Pt seen with Dr. Soto  2LNC  SR    Pt stated he feels like he needs more Lasix.     Objective:     /77   Pulse 63   Temp 98.3 °F (36.8 °C) (Oral)   Resp 15   Ht 1.803 m (5' 11\")   Wt 92.5 kg (203 lb 14.8 oz)   SpO2 96%   BMI 28.44 kg/m²   Temp (24hrs), Av.4 °F (36.9 °C), Min:97.8 °F (36.6 °C), Max:98.9 °F (37.2 °C)      Last 24hr Input/Output:    Intake/Output Summary (Last 24 hours) at 2025 0647  Last data filed at 2025 0420  Gross per 24 hour   Intake 410 ml   Output 2880 ml   Net -2470 ml        EKG/Rhythm: SR       Oxygen: 2LNC    CXR: pending       Admission Weight: Last Weight   Weight - Scale: 107.3 kg (236 lb 8.9 oz) Weight - Scale: 92.5 kg (203 lb 14.8 oz)       EXAM:  General: WDWN man in NAD sitting in chair.    Lungs:   Coarse and musical breath sounds all posterior lung fields. Additionally, productive cough.    Incision:  No erythema, drainage or swelling.   Heart:  Regular rate and rhythm.  + 2/6 ABIGAIL loudest left mid axillary  line.     Abdomen:   Soft, non-tender. Bowel sounds normal. No masses,  No organomegaly.  Distended according to habitus.    Extremities:  1-2 + BLE  edema. PPP   Neurologic:  Gross motor and sensory apparatus intact.     Activity:up with assistance.     Diet:  carb controlled, protein supplements.      Lab Data Reviewed:   Recent Labs     25  0451   WBC 10.1   HGB 7.6*   HCT 23.7*   *   *   K 4.1   BUN 30*         Assessment:     Principal Problem:    Severe aortic stenosis  Active Problems:    Aortic stenosis    Aortic insufficiency    S/P AVR    S/P aortic aneurysm repair    S/P left atrial appendage 
Peripheral IV, Arterial Line, Chest Tube, and Ba    Peripheral IV x 2: NO        Skin:    Wound Care Documentation:         Po Scale Score: 20  Interventions: Wound Offloading (Prevention Methods): Bed, pressure reduction mattress, Elevate heels, Pillows, Repositioning    Incision Care Completed This Shift: YES    CHG Bath Completed This Shift: YES    Pain Management:   Acceptable - Some pain at times, but the patient does not wish to increase their medications     Patient Safety:  Fall Risk: Nursing Judgement-Fall Risk High(Add Comments): No  Fall Risk Interventions  Nursing Judgement-Fall Risk High(Add Comments): No  Toilet Every 2 Hours-In Advance of Need: Yes  Hourly Visual Checks: Awake, In bed  Fall Visual Posted: Socks, Fall sign posted, Armband  Room Door Open: Yes  Alarm On: Bed  Patient Moved Closer to Nursing Station: No    Intake/Output  I/O this shift:  In: 5.5 [I.V.:5.5]  Out: -      Past 24 hrs  In: 3535.8 [P.O.:2760; I.V.:775.8]  Out: 1340 [Urine:740]     Net IO Since Admission: 8,410.98 mL [06/06/25 0750]      Last Weight Metrics:      6/6/2025     6:00 AM 6/5/2025     6:00 AM 6/4/2025     5:59 AM 5/29/2025     1:24 PM 5/20/2025     6:34 AM 5/19/2025     4:25 AM 5/18/2025     5:00 AM   Weight Loss Metrics   Height   5' 11\" 5' 11\"      Weight - Scale 250 lbs 7 oz 243 lbs 3 oz 236 lbs 9 oz 231 lbs 15 oz 219 lbs 13 oz 217 lbs 217 lbs 13 oz   BMI (Calculated) 35 kg/m2 34 kg/m2 33.1 kg/m2 32.4 kg/m2 29.9 kg/m2 29.5 kg/m2 29.6 kg/m2       Weight change: 3.3 kg (7 lb 4.4 oz)     Accuracy of I/O Report Reviewed: YES    Drips:   Insulin gtt    Number of Albumin Given: 0    Critical Lab Results:  None    Active Consults:   IP CONSULT TO DIABETES MANAGEMENT  IP CONSULT TO CARDIAC REHAB  IP CONSULT TO CASE MANAGEMENT  IP CONSULT TO INTERNAL MEDICINE    Length of Stay:  Expected LOS: 5  Actual LOS: 2    Tato Resendiz RN                   
YES    CHG Bath Completed This Shift: NO    Pain Management:   Acceptable - Some pain at times, but the patient does not wish to increase their medications     Patient Safety:  Fall Risk: Nursing Judgement-Fall Risk High(Add Comments): Yes  Fall Risk Interventions  Nursing Judgement-Fall Risk High(Add Comments): Yes  Toilet Every 2 Hours-In Advance of Need: Yes  Hourly Visual Checks: Awake, In chair  Fall Visual Posted: Armband, Fall sign posted, Socks  Room Door Open: Yes  Alarm On: Chair  Patient Moved Closer to Nursing Station: No    Intake/Output  No intake/output data recorded.     Past 24 hrs  In: 110 [P.O.:100; I.V.:10]  Out: 1085 [Urine:1085]     Net IO Since Admission: 3,838.03 mL [06/08/25 1901]      Last Weight Metrics:      6/8/2025     7:12 AM 6/7/2025     5:13 AM 6/6/2025     6:00 AM 6/5/2025     6:00 AM 6/4/2025     5:59 AM 5/29/2025     1:24 PM 5/20/2025     6:34 AM   Weight Loss Metrics   Height     5' 11\" 5' 11\"    Weight - Scale 245 lbs 6 oz 203 lbs 15 oz 250 lbs 7 oz 243 lbs 3 oz 236 lbs 9 oz 231 lbs 15 oz 219 lbs 13 oz   BMI (Calculated) 34.3 kg/m2 28.5 kg/m2 35 kg/m2 34 kg/m2 33.1 kg/m2 32.4 kg/m2 29.9 kg/m2       Weight change:      Accuracy of I/O Report Reviewed: YES    Drips:   none    Number of Albumin Given: 0    Critical Lab Results:  None    Active Consults:   IP CONSULT TO DIABETES MANAGEMENT  IP CONSULT TO CARDIAC REHAB  IP CONSULT TO CASE MANAGEMENT  IP CONSULT TO INTERNAL MEDICINE  IP CONSULT TO CASE MANAGEMENT  IP CONSULT TO HOME CARE NEEDS    Length of Stay:  Expected LOS: 5  Actual LOS: 4    Olga Jason RN                   
[I.V.:1000; Blood:2833.3; IV Piggyback:1550]  Out: 1383 [Urine:897; Blood:300; Chest Tube:186]     Past 24 hrs  In: 5383.3 [I.V.:1000; Blood:2833.3]  Out: 1383 [Urine:897]     Net IO Since Admission: 4,000.3 mL [06/04/25 1859]      Last Weight Metrics:      6/4/2025     5:59 AM 5/29/2025     1:24 PM 5/20/2025     6:34 AM 5/19/2025     4:25 AM 5/18/2025     5:00 AM 5/16/2025     5:15 AM 5/14/2025     6:00 AM   Weight Loss Metrics   Height 5' 11\" 5' 11\"    6' 0.008\"    Weight - Scale 236 lbs 9 oz 231 lbs 15 oz 219 lbs 13 oz 217 lbs 217 lbs 13 oz 216 lbs 8 oz 217 lbs 8 oz   BMI (Calculated) 33.1 kg/m2 32.4 kg/m2 29.9 kg/m2 29.5 kg/m2 29.6 kg/m2 29.4 kg/m2 29.6 kg/m2       Weight change:      Accuracy of I/O Report Reviewed: YES    Drips:   Epinephrine, Norepinephrine, Vasopressin, and Dobutamine    Number of Albumin Given: 1    Critical Lab Results:  None    Active Consults:   IP CONSULT TO DIABETES MANAGEMENT  IP CONSULT TO CARDIAC REHAB  IP CONSULT TO CASE MANAGEMENT  IP CONSULT TO INTERNAL MEDICINE    Length of Stay:  Expected LOS: 5  Actual LOS: 0    Evangelist Duenas RN                    
66  Acute HFrEF, NYHA class I: LVEF 35-40%. Strict I&O. Daily standing weights. GDMT as tolerated. Prev on spironolactone -  held now for hypotension at [facility].   Postop acute resp failure with hypercapnia, Atelectasis: Increase mobility. Hourly I.S. Wean oxygen for O2 sat > 92%. Extubated to bipap, now on NC  Leukocytosis: Improving. Increase mobility. Pulm toileting. Daily incisional care.   COPD: On Trelegy Ellipta PTA.   Mild bilateral carotid stenosis: Continue ASA.  Tobacco abuse: Advise cessation. Nicotine patch as needed.   Alcohol abuse: Advise cessation.  Cocaine use: Advise cessation     Pain regimen: scheduled tylenol, gabapentin, robaxin, lidocaine patches, prn oxy and dilaudid  Fluid and electrolytes: Maintain K+ >4 and Mg level >2.  Nutrition: advance diet as tolerated, cardiac, added supplement   Activity: OOB all meals and ambulate in halls, encourage I/S   Bowel Regimen: Senokot , Miralax, and prn dulcolax   GI ppx: Pepcid  DVT ppx: SCDs     Dispo: Transfer to intermediate status. PT/OT. Up moving as able. CM following to aid in d/c planning. Home with HH services 2-3 days    Signed By: Krishan Vargas PA-C  
Advise cessation. Nicotine patch as needed. Add mucinex for congested cough  Alcohol abuse: Advise cessation.  Cocaine use: Advise cessation      Pain regimen: scheduled Tylenol, gabapentin, Robaxin, lidocaine patches, prn oxy and Dilaudid  Fluid and electrolytes: Maintain K+ >4 and Mg level >2.  Nutrition: advance diet as tolerated, cardiac, added supplement   Activity: OOB all meals and ambulate in halls, encourage I/S   Bowel Regimen: Senokot , Miralax, and prn dulcolax   GI ppx: Pepcid  DVT ppx: SCDs and enoxaparin Sq.      Dispo: Intermediate Care. PT/OT. Up walking in the halls TID. Push I.S. and flutter valve.   Home today.       Signed By: BENJAMIN Delgadillo NP    
MANAGEMENT    Length of Stay:  Expected LOS: 6  Actual LOS: 6    Leann Leung RN                    
Robaxin, lidocaine patches, prn oxy and Dilaudid  Fluid and electrolytes: Maintain K+ >4 and Mg level >2.  Nutrition: advance diet as tolerated, cardiac, added supplement   Activity: OOB all meals and ambulate in halls, encourage I/S   Bowel Regimen: Senokot , Miralax, and prn dulcolax   GI ppx: Pepcid  DVT ppx: SCDs and enoxaparin Sq.     Keep in Step down.      Signed By: BENJAMIN Hayward - NP    
Station: No    Intake/Output  I/O this shift:  In: 56.8 [I.V.:56.8]  Out: -      Past 24 hrs  In: 8765 [P.O.:1510; I.V.:2297.5; Blood:3047.3]  Out: 2493 [Urine:1607]     Net IO Since Admission: 6,272.03 mL [06/05/25 0742]      Last Weight Metrics:      6/5/2025     6:00 AM 6/4/2025     5:59 AM 5/29/2025     1:24 PM 5/20/2025     6:34 AM 5/19/2025     4:25 AM 5/18/2025     5:00 AM 5/16/2025     5:15 AM   Weight Loss Metrics   Height  5' 11\" 5' 11\"    6' 0.008\"   Weight - Scale 243 lbs 3 oz 236 lbs 9 oz 231 lbs 15 oz 219 lbs 13 oz 217 lbs 217 lbs 13 oz 216 lbs 8 oz   BMI (Calculated) 34 kg/m2 33.1 kg/m2 32.4 kg/m2 29.9 kg/m2 29.5 kg/m2 29.6 kg/m2 29.4 kg/m2       Weight change: 3 kg (6 lb 9.8 oz)     Accuracy of I/O Report Reviewed: YES    Drips:   Epinephrine, Norepinephrine, Dobutamine, and Insulin    Number of Albumin Given: 2    Critical Lab Results:  PATITO    Active Consults:   IP CONSULT TO DIABETES MANAGEMENT  IP CONSULT TO CARDIAC REHAB  IP CONSULT TO CASE MANAGEMENT  IP CONSULT TO INTERNAL MEDICINE    Length of Stay:  Expected LOS: 5  Actual LOS: 1    Tato Resendiz RN

## 2025-06-10 NOTE — PLAN OF CARE
Problem: Physical Therapy - Adult  Goal: By Discharge: Performs mobility at highest level of function for planned discharge setting.  See evaluation for individualized goals.  Description: FUNCTIONAL STATUS PRIOR TO ADMISSION: Patient was independent and active without use of DME. Had been working up to 7 days a week as an  until recent health issues. ETOH use and cocaine use history.    HOME SUPPORT PRIOR TO ADMISSION: The patient lived with spouse.    Physical Therapy Goals  Initiated 6/5/2025  1.  Patient will move from supine to sit and sit to supine, scoot up and down, and roll side to side in bed with modified independence within 5 day(s).    2.  Patient will perform sit to stand with modified independence within 5 day(s).  3.  Patient will transfer from bed to chair and chair to bed with modified independence using the least restrictive device within 5 day(s).  4.  Patient will ambulate with modified independence for 200 feet with the least restrictive device within 5 day(s).   5.  Patient will perform cardiac exercises per protocol with modified independence within 5 days.  6.  Patient will verbally recall and functionally demonstrate mindful-based movements (\"move in the tube\") principles without cues within 5 days.  Outcome: Progressing   PHYSICAL THERAPY TREATMENT    Patient: Jose Díaz III (59 y.o. male)  Date: 6/7/2025  Diagnosis: Aortic stenosis [I35.0]  Aortic insufficiency [I35.1]  Severe aortic stenosis [I35.0] Severe aortic stenosis  Procedure(s) (LRB):  LUPILLO BY DR SANZ - LEFT ATRIAL APPENDAGE LIGATION WITH  50 MM ATICURE CLIP X 2 - AORTIC VALVE REPLACEMENT WITH 25 MM INSPIRIS RESILIA AORTIC TISSUE VALVE  AND ASCENDING AORTIC ANEURYSM REPAIR WITH 30 MM GETINGE HEMASHIELD PLATINUM GRAFT (N/A) 3 Days Post-Op  Precautions: Restrictions/Precautions  Restrictions/Precautions: Cardiac     Position Activity Restriction  Sternal Precautions: 5# Lifting Restrictions, Move in the 
  Problem: Physical Therapy - Adult  Goal: By Discharge: Performs mobility at highest level of function for planned discharge setting.  See evaluation for individualized goals.  Description: FUNCTIONAL STATUS PRIOR TO ADMISSION: Patient was independent and active without use of DME. Had been working up to 7 days a week as an  until recent health issues. ETOH use and cocaine use history.    HOME SUPPORT PRIOR TO ADMISSION: The patient lived with spouse.    Physical Therapy Goals  Initiated 6/5/2025  1.  Patient will move from supine to sit and sit to supine, scoot up and down, and roll side to side in bed with modified independence within 5 day(s).    2.  Patient will perform sit to stand with modified independence within 5 day(s).  3.  Patient will transfer from bed to chair and chair to bed with modified independence using the least restrictive device within 5 day(s).  4.  Patient will ambulate with modified independence for 200 feet with the least restrictive device within 5 day(s).   5.  Patient will perform cardiac exercises per protocol with modified independence within 5 days.  6.  Patient will verbally recall and functionally demonstrate mindful-based movements (\"move in the tube\") principles without cues within 5 days.  Outcome: Progressing   PHYSICAL THERAPY TREATMENT    Patient: Jose Díaz III (59 y.o. male)  Date: 6/8/2025  Diagnosis: Aortic stenosis [I35.0]  Aortic insufficiency [I35.1]  Severe aortic stenosis [I35.0] Severe aortic stenosis  Procedure(s) (LRB):  LUPILLO BY DR SANZ - LEFT ATRIAL APPENDAGE LIGATION WITH  50 MM ATICURE CLIP X 2 - AORTIC VALVE REPLACEMENT WITH 25 MM INSPIRIS RESILIA AORTIC TISSUE VALVE  AND ASCENDING AORTIC ANEURYSM REPAIR WITH 30 MM GETINGE HEMASHIELD PLATINUM GRAFT (N/A) 4 Days Post-Op  Precautions: Restrictions/Precautions  Restrictions/Precautions: Cardiac     Position Activity Restriction  Sternal Precautions: 5# Lifting Restrictions, Move in the 
  Problem: Safety - Medical Restraint  Goal: Remains free of injury from restraints (Restraint for Interference with Medical Device)  Description: INTERVENTIONS:1. Determine that other, less restrictive measures have been tried or would not be effective before applying the restraint2. Evaluate the patient's condition at the time of restraint application3. Inform patient/family regarding the reason for restraint4. Q2H: Monitor safety, psychosocial status, comfort, nutrition and hydration  Outcome: Completed  Flowsheets (Taken 6/4/2025 4828)  Remains free of injury from restraints (restraint for interference with medical device):   Determine that other, less restrictive measures have been tried or would not be effective before applying the restraint   Inform patient/family regarding the reason for restraint   Evaluate the patient's condition at the time of restraint application   Every 2 hours: Monitor safety, psychosocial status, comfort, nutrition and hydration     
Problem: Physical Therapy - Adult  Goal: By Discharge: Performs mobility at highest level of function for planned discharge setting.  See evaluation for individualized goals.  Description: FUNCTIONAL STATUS PRIOR TO ADMISSION: Patient was independent and active without use of DME. Had been working up to 7 days a week as an  until recent health issues. ETOH use and cocaine use history.    HOME SUPPORT PRIOR TO ADMISSION: The patient lived with spouse.    Physical Therapy Goals  Initiated 6/5/2025  1.  Patient will move from supine to sit and sit to supine, scoot up and down, and roll side to side in bed with modified independence within 5 day(s).    2.  Patient will perform sit to stand with modified independence within 5 day(s).  3.  Patient will transfer from bed to chair and chair to bed with modified independence using the least restrictive device within 5 day(s).  4.  Patient will ambulate with modified independence for 200 feet with the least restrictive device within 5 day(s).   5.  Patient will perform cardiac exercises per protocol with modified independence within 5 days.  6.  Patient will verbally recall and functionally demonstrate mindful-based movements (\"move in the tube\") principles without cues within 5 days.  Outcome: Progressing     PHYSICAL THERAPY TREATMENT    Patient: Jose Díaz III (59 y.o. male)  Date: 6/6/2025  Diagnosis: Aortic stenosis [I35.0]  Aortic insufficiency [I35.1]  Severe aortic stenosis [I35.0] Severe aortic stenosis  Procedure(s) (LRB):  LUPILLO BY DR SANZ - LEFT ATRIAL APPENDAGE LIGATION WITH  50 MM ATICURE CLIP X 2 - AORTIC VALVE REPLACEMENT WITH 25 MM INSPIRIS RESILIA AORTIC TISSUE VALVE  AND ASCENDING AORTIC ANEURYSM REPAIR WITH 30 MM GETINGE HEMASHIELD PLATINUM GRAFT (N/A) 2 Days Post-Op  Precautions: Restrictions/Precautions  Restrictions/Precautions: Cardiac     Position Activity Restriction  Sternal Precautions: 5# Lifting Restrictions, Move in the 
Problem: Physical Therapy - Adult  Goal: By Discharge: Performs mobility at highest level of function for planned discharge setting.  See evaluation for individualized goals.  Description: FUNCTIONAL STATUS PRIOR TO ADMISSION: Patient was independent and active without use of DME. Had been working up to 7 days a week as an  until recent health issues. ETOH use and cocaine use history.    HOME SUPPORT PRIOR TO ADMISSION: The patient lived with spouse.    Physical Therapy Goals  Initiated 6/5/2025  1.  Patient will move from supine to sit and sit to supine, scoot up and down, and roll side to side in bed with modified independence within 5 day(s).    2.  Patient will perform sit to stand with modified independence within 5 day(s).  3.  Patient will transfer from bed to chair and chair to bed with modified independence using the least restrictive device within 5 day(s).  4.  Patient will ambulate with modified independence for 200 feet with the least restrictive device within 5 day(s).   5.  Patient will perform cardiac exercises per protocol with modified independence within 5 days.  6.  Patient will verbally recall and functionally demonstrate mindful-based movements (\"move in the tube\") principles without cues within 5 days.  Outcome: Progressing    PHYSICAL THERAPY EVALUATION    Patient: Jose Díaz III (59 y.o. male)  Date: 6/5/2025  Primary Diagnosis: Aortic stenosis [I35.0]  Aortic insufficiency [I35.1]  Severe aortic stenosis [I35.0]  Procedure(s) (LRB):  LUPILLO BY DR SANZ - LEFT ATRIAL APPENDAGE LIGATION WITH  50 MM ATICURE CLIP X 2 - AORTIC VALVE REPLACEMENT WITH 25 MM INSPIRIS RESILIA AORTIC TISSUE VALVE  AND ASCENDING AORTIC ANEURYSM REPAIR WITH 30 MM GETINGE HEMASHIELD PLATINUM GRAFT (N/A) 1 Day Post-Op   Precautions: Restrictions/Precautions  Restrictions/Precautions: Fall Risk     Position Activity Restriction  Sternal Precautions: Move in the Tube    ASSESSMENT :   DEFICITS/IMPAIRMENTS:   The 
Discharge  6/10/2025 0551 by Leann Leung RN  Outcome: Progressing  Flowsheets (Taken 6/9/2025 2050)  Maintains optimal cardiac output and hemodynamic stability:   Monitor blood pressure and heart rate   Monitor urine output and notify Licensed Independent Practitioner for values outside of normal range   Assess for signs of decreased cardiac output  Goal: Absence of cardiac dysrhythmias or at baseline  6/10/2025 1145 by Geeta Garces RN  Outcome: Adequate for Discharge  6/10/2025 0551 by Leann Leung RN  Outcome: Progressing  Flowsheets (Taken 6/9/2025 2050)  Absence of cardiac dysrhythmias or at baseline:   Monitor cardiac rate and rhythm   Assess for signs of decreased cardiac output   Administer antiarrhythmia medication and electrolyte replacement as ordered     Problem: Skin/Tissue Integrity - Adult  Goal: Skin integrity remains intact  Description: 1.  Monitor for areas of redness and/or skin breakdown2.  Assess vascular access sites hourly3.  Every 4-6 hours minimum:  Change oxygen saturation probe site4.  Every 4-6 hours:  If on nasal continuous positive airway pressure, respiratory therapy assess nares and determine need for appliance change or resting period  6/10/2025 1145 by Geeta Garces RN  Outcome: Adequate for Discharge  Flowsheets (Taken 6/10/2025 0800)  Skin Integrity Remains Intact:   Monitor for areas of redness and/or skin breakdown   Assess vascular access sites hourly   Every 4-6 hours minimum:  Change oxygen saturation probe site  6/10/2025 0551 by Leann Leung RN  Outcome: Progressing  Flowsheets (Taken 6/9/2025 2050)  Skin Integrity Remains Intact:   Monitor for areas of redness and/or skin breakdown   Pressure redistribution bed/mattress (bed type)  Goal: Incisions, wounds, or drain sites healing without S/S of infection  6/10/2025 1145 by Geeta Garces, RN  Outcome: Adequate for Discharge  6/10/2025 0551 by Leann Leung RN  Outcome: Progressing  Flowsheets 
                          standing   Trunk rotation  1  5  [x]                             []                             []                             []                                Trunk sidebending  1  5  [x]                             []                              []                             []                                          Pain Rating:  No c/o pain    Activity Tolerance:   Fair , requires rest breaks, and desaturates with activity and requires oxygen  Please refer to the flowsheet for vital signs taken during this treatment.    After treatment:   Patient left in no apparent distress sitting up in chair, Call bell within reach, and Updated patient's board on functional status and mobility recommendations    COMMUNICATION/EDUCATION:   The patient's plan of care was discussed with: physical therapist and registered nurse         Thank you for this referral.  Rupal Hawkins OT  Minutes: 29   
  Good  Please refer to the flowsheet for vital signs taken during this treatment.    After treatment:   Patient left in no apparent distress sitting up in chair and Call bell within reach    COMMUNICATION/EDUCATION:   The patient's plan of care was discussed with: physical therapist and registered nurse         Thank you for this referral.  Rupal Hawkins OT  Minutes: 31   
  Scapular retraction  1  5  [x]                             []                             []                             []                                Trunk rotation  1  5  [x]                             []                             []                             []                                Trunk sidebending  1  5  [x]                             []                              []                             []                                          Pain Rating:  No c/o pain    Activity Tolerance:   Fair  and desaturates with activity and requires oxygen  Please refer to the flowsheet for vital signs taken during this treatment.    After treatment:   Patient left in no apparent distress sitting up in chair, Call bell within reach, and Bed/ chair alarm activated    COMMUNICATION/EDUCATION:   The patient's plan of care was discussed with: physical therapist and registered nurse         Thank you for this referral.  Rupal Hawkins OT  Minutes: 31   
acapella  Education Method: Verbal  Barriers to Learning: None  Education Outcome: Verbalized understanding;Demonstrated understanding      Ana Jenkins PT  Minutes: 24    
perform upper extremity exercises \"outside of the tube\" to prevent scar tissue formation around sternal incision site.      Patient instructed on no asymmetrical reaching over head to ensure BUEs are lifted together above 90* of shoulder flexion.    Pt instructed that they may have to adjust home setup to increase ease with items closer to waist height to prevent deep bending and asymmetrical UE WB/pushing for stabilization during bending.       Toileting: Educated to rotate at the waist having shoulders rotate with waist to perform yaquelin/anal care with Fair understanding.      Therapeutic Exercises:   Patient instructed on the benefits and demonstrated cardiac exercises while standing with Stand by Assist. Instructed and indicated understanding on how to progress reps, sets against gravity, pacing through progressive muscle strengthening standing based on surgeon clearance for more weight in prep for basic and instrumental ADLs. Instruction on the use of household items in place of weights as needed.    CARDIAC   EXERCISE    Sets    Reps    Active  Active Assist    Passive  Self ROM    Comments    Shoulder flexion  1  5   [x]                            []                             []                             []                                Shoulder abduction  1  5  [x]                             []                             []                             []                                Scapular elevation  1  5  [x]                             []                              []                             []                                Scapular retraction  1  5  [x]                             []                             []                             []                                Trunk rotation  1  5  [x]                             []                             []                             []                                Trunk sidebending  1  5  [x]                             []

## 2025-06-10 NOTE — DISCHARGE SUMMARY
Cardiac Surgery Discharge Summary     Patient ID:  Jose Díaz III  [id number]  59 y.o.  1965    Admit date: 6/4/2025    Discharge date: 6/10/2025     Admitting Physician: Duglas Soto MD     Referring Cardiologist:  Dr. Cooley    PCP:  No primary care provider on file.    Admitting Diagnoses: severe AS    Discharge Diagnoses:     1. Severe aortic stenosis    2. S/P AVR    3. S/P left atrial appendage ligation        Discharged Condition: Good and Stable    Disposition: home, see patient instructions for treatment and plan    Procedures for this admission:  Procedure(s):  LUPILLO BY DR SANZ - LEFT ATRIAL APPENDAGE LIGATION WITH  50 MM ATICURE CLIP X 2 - AORTIC VALVE REPLACEMENT WITH 25 MM INSPIRIS RESILIA AORTIC TISSUE VALVE  AND ASCENDING AORTIC ANEURYSM REPAIR WITH 30 MM GETINGE HEMASHIELD PLATINUM GRAFT    Discharge Medications:      Medication List        START taking these medications      acetaminophen 500 MG tablet  Commonly known as: TYLENOL  Take 2 tablets by mouth every 6 hours     atorvastatin 40 MG tablet  Commonly known as: LIPITOR  Take 1 tablet by mouth nightly     empagliflozin 10 MG tablet  Commonly known as: JARDIANCE  Take 1 tablet by mouth daily  Start taking on: June 11, 2025     furosemide 40 MG tablet  Commonly known as: LASIX  Take 1 tablet by mouth daily for 5 days  Start taking on: June 11, 2025     guaiFENesin 600 MG extended release tablet  Commonly known as: MUCINEX  Take 2 tablets by mouth 2 times daily for 14 days     oxyCODONE 5 MG immediate release tablet  Commonly known as: ROXICODONE  Take 1 tablet by mouth every 8 hours as needed for Pain for up to 7 days. Max Daily Amount: 15 mg     polyethylene glycol 17 g packet  Commonly known as: GLYCOLAX  Take 1 packet by mouth daily as needed for Constipation     potassium chloride 20 MEQ extended release tablet  Commonly known as: KLOR-CON M  Take 1 tablet by mouth daily for 5 days     sennosides-docusate sodium 8.6-50 MG

## 2025-06-11 ENCOUNTER — TELEPHONE (OUTPATIENT)
Facility: HOSPITAL | Age: 60
End: 2025-06-11

## 2025-06-11 ENCOUNTER — TELEPHONE (OUTPATIENT)
Age: 60
End: 2025-06-11

## 2025-06-11 NOTE — TELEPHONE ENCOUNTER
pt called in wanting to know if his x-ray came back ok , he stated he feels the same way that he did at the hospital but still has the popping sound

## 2025-06-11 NOTE — TELEPHONE ENCOUNTER
Cardiac Surgery Discharge - Follow up call placed to Jose Daíz III. Reviewed plan of care after discharge and encouraged Jose Díaz III to verbalize. Discussed precautions. Pt received medications through meds to beds and they were reviewed prior to discharge. Patient without questions regarding medications. Encouraged continued use of the incentive spirometer, daily weights and temp. He is showering and walking. Home health nurse saw pt today. Confirmed follow up appts and reinforced importance of wearing red reminder bracelet. Jose Díaz III is without questions or concerns. Janay Adams RN

## 2025-06-11 NOTE — TELEPHONE ENCOUNTER
Patient states he feels a popping in his chest that feels more respiratory. Reminded him to do his IS x10 an hour which he sats he has been doing. Reminded him to aslo drink water and he can take mucinex as needed.

## 2025-06-13 ENCOUNTER — TELEPHONE (OUTPATIENT)
Age: 60
End: 2025-06-13

## 2025-06-13 PROBLEM — Z01.810 PREOP CARDIOVASCULAR EXAM: Status: RESOLVED | Noted: 2025-05-14 | Resolved: 2025-06-13

## 2025-06-13 RX ORDER — POTASSIUM CHLORIDE 1500 MG/1
40 TABLET, EXTENDED RELEASE ORAL 2 TIMES DAILY
Qty: 12 TABLET | Refills: 0 | Status: SHIPPED | OUTPATIENT
Start: 2025-06-13 | End: 2025-06-16

## 2025-06-13 RX ORDER — BUMETANIDE 2 MG/1
1 TABLET ORAL 2 TIMES DAILY
Qty: 3 TABLET | Refills: 0 | Status: SHIPPED | OUTPATIENT
Start: 2025-06-13 | End: 2025-06-16

## 2025-06-13 NOTE — TELEPHONE ENCOUNTER
RN called patient who states he gained 4 lbs since he has been home. States he spoke to Marily SANTIAGO earlier who prescribed bumex. Informed him to call back if he needs assistance sooner.

## 2025-06-13 NOTE — PROGRESS NOTES
Patient called reporting increasing leg swelling and weight gain despite 40 of Lasix daily. Not smoking or drinking. Not adding salt to diet but did eat canned peas yesterday. Will change diuretic to Bumex for three days (with scheduled KCL). Patient to call back Monday and let us know how he's doing. Continuing to use compression stockings.    Encouraged compliance with medications and to report any concerns about SE to providers, as well as compliance vaccinations, low sodium diet and drinking to thirst. Advised avoiding tobacco and alcohol, and counseled on the importance of regular activity. Patient instructed to weigh self daily and keep a log. Patient instructed to notify provider for weight gain >3 lbs in one day or >5 lbs in one week, as well as increasing SOB, fatigue, dizziness/lightheadedness or SADIA. Patient instructed to call 911 for severe trouble breathing.

## 2025-06-17 NOTE — PROGRESS NOTES
Patient: Jose Díaz III   Age: 59 y.o.     Patient Care Team:  Duglas Soto MD as Consulting Physician (Cardiothoracic Surgery)    Diagnosis: The primary encounter diagnosis was S/P AVR. Diagnoses of S/P aortic aneurysm repair, S/P left atrial appendage ligation, Heart failure with improved ejection fraction (HFimpEF) (HCC), and Acute blood loss as cause of postoperative anemia were also pertinent to this visit.    Problem List:   Patient Active Problem List   Diagnosis    Acute hypoxemic respiratory failure (HCC)    Acute respiratory failure with hypoxemia (HCC)    Bilateral carotid artery stenosis    Aortic stenosis    Aortic insufficiency    Severe aortic stenosis    S/P AVR    S/P aortic aneurysm repair    S/P left atrial appendage ligation          Date of Surgery: 6/4/25     Surgery: LEFT ATRIAL APPENDAGE LIGATION WITH  50 MM ATICURE CLIP X 2 - AORTIC VALVE REPLACEMENT WITH 25 MM INSPIRIS RESILIA AORTIC TISSUE VALVE  AND ASCENDING AORTIC ANEURYSM REPAIR WITH 30 MM GETINGE HEMASHIELD PLATINUM GRAFT     HPI:  **obtained from prior encounter - updates added  Jose Díaz III is a 59 y.o. male with a past medical history of severe COPD, active tobacco. Etoh and cocaine use, who was referred for cardiac evaluation by Dr. Bermudez for severe AS and dilation of ascending thoracic aorta of 4.7 cm.      Patient presented to [facility] 5/11/25 with CC SOB that had been gradually worsening over the prior week and suddenly worse the day of presentation. This SOB occurred at rest but was worse with activity. Reports orthopnea associated with this but denies PND. He had leftover steroids and antibiotics from a prior COPD- he took cefdinir the day prior with no relief.  Patient was hypertensive, tachycardia, tachypnic and hypoxic upon arrival. Rales were noted upon exam. EKG showed NSR. WBC elevated at 15.4. Glucose 135. Lactic acid 1.3. HS trop 202/216/218. NT pro BNP 10,182. Negative for flu and COVID. Pattern of mild pulmonary

## 2025-06-18 ENCOUNTER — OFFICE VISIT (OUTPATIENT)
Age: 60
End: 2025-06-18

## 2025-06-18 ENCOUNTER — HOSPITAL ENCOUNTER (OUTPATIENT)
Facility: HOSPITAL | Age: 60
Discharge: HOME | End: 2025-06-21
Payer: COMMERCIAL

## 2025-06-18 ENCOUNTER — HOSPITAL ENCOUNTER (OUTPATIENT)
Facility: HOSPITAL | Age: 60
Discharge: HOME | End: 2025-06-21

## 2025-06-18 VITALS — TEMPERATURE: 98.1 F | OXYGEN SATURATION: 93 % | HEART RATE: 76 BPM | WEIGHT: 248.8 LBS | BODY MASS INDEX: 34.7 KG/M2

## 2025-06-18 DIAGNOSIS — D62 ACUTE BLOOD LOSS AS CAUSE OF POSTOPERATIVE ANEMIA: ICD-10-CM

## 2025-06-18 DIAGNOSIS — I50.32 HEART FAILURE WITH IMPROVED EJECTION FRACTION (HFIMPEF) (HCC): ICD-10-CM

## 2025-06-18 DIAGNOSIS — Z95.2 S/P AVR: Primary | ICD-10-CM

## 2025-06-18 DIAGNOSIS — Z98.890 S/P AORTIC ANEURYSM REPAIR: ICD-10-CM

## 2025-06-18 DIAGNOSIS — I50.32 HEART FAILURE WITH IMPROVED EJECTION FRACTION (HFIMPEF) (HCC): Primary | ICD-10-CM

## 2025-06-18 DIAGNOSIS — Z95.2 S/P AVR: ICD-10-CM

## 2025-06-18 DIAGNOSIS — Z98.890 S/P LEFT ATRIAL APPENDAGE LIGATION: ICD-10-CM

## 2025-06-18 DIAGNOSIS — Z86.79 S/P AORTIC ANEURYSM REPAIR: ICD-10-CM

## 2025-06-18 PROCEDURE — 71046 X-RAY EXAM CHEST 2 VIEWS: CPT

## 2025-06-18 PROCEDURE — 99024 POSTOP FOLLOW-UP VISIT: CPT | Performed by: THORACIC SURGERY (CARDIOTHORACIC VASCULAR SURGERY)

## 2025-06-18 RX ORDER — BUMETANIDE 2 MG/1
1 TABLET ORAL 2 TIMES DAILY
Qty: 5 TABLET | Refills: 0 | Status: SHIPPED | OUTPATIENT
Start: 2025-06-18 | End: 2025-06-23

## 2025-06-18 RX ORDER — TRAMADOL HYDROCHLORIDE 50 MG/1
50 TABLET ORAL EVERY 4 HOURS PRN
Qty: 10 TABLET | Refills: 0 | Status: SHIPPED | OUTPATIENT
Start: 2025-06-18 | End: 2025-06-21

## 2025-06-18 RX ORDER — CARVEDILOL 3.12 MG/1
3.12 TABLET ORAL 2 TIMES DAILY
Qty: 60 TABLET | Refills: 1 | Status: SHIPPED | OUTPATIENT
Start: 2025-06-18

## 2025-06-18 RX ORDER — POTASSIUM CHLORIDE 1500 MG/1
40 TABLET, EXTENDED RELEASE ORAL 2 TIMES DAILY
Qty: 20 TABLET | Refills: 0 | Status: SHIPPED | OUTPATIENT
Start: 2025-06-18 | End: 2025-06-23

## 2025-06-18 NOTE — PROGRESS NOTES
Called and went over CXR results. Patient needs to aggressively mobilize, DB&C and use IS. Stressed this to the patient and his wife. We will repeat a CXR on Friday. Patient and wife verbalized understanding.

## 2025-06-18 NOTE — PROGRESS NOTES
Name: Jose Díaz III   : 1965   Home Phone: [phone]     Reviewed record in preparation for visit and have obtained necessary documentation. Identified pt with two pt identifiers (name and ).     1. Have you been to the ER, urgent care clinic since your last visit?  Hospitalized since your last visit?No    2. Have you seen or consulted any other health care providers outside of the [facility] since your last visit?  Include any pap smears or colon screening. No      Jose Díaz III is being seen for AVR/AVr follow up approximately “One week post-operatively.     Patient counseled on Wound care, Diet, ERAS Protocol, Signs of infection, and Office contact information and instructed to follow up  in approximately one month. Patient given opportunity to ask questions and receive clarification.     Current Medications-Reviewed with Patient    Allergies-Reviewed with Patient    Patient's sternotomy dressing removed per protocol with no complications. Image uploaded to patient's media tab.      Vitals  Pulse 76   Temp 98.1 °F (36.7 °C)   Wt 112.9 kg (248 lb 12.8 oz)   SpO2 93%   BMI 34.70 kg/m²

## 2025-06-19 ENCOUNTER — TELEPHONE (OUTPATIENT)
Age: 60
End: 2025-06-19

## 2025-06-19 LAB
ANION GAP SERPL CALC-SCNC: 7 MMOL/L (ref 2–12)
BASOPHILS # BLD: 0.03 K/UL (ref 0–0.1)
BASOPHILS NFR BLD: 0.3 % (ref 0–1)
BUN SERPL-MCNC: 15 MG/DL (ref 6–20)
BUN/CREAT SERPL: 20 (ref 12–20)
CALCIUM SERPL-MCNC: 8.7 MG/DL (ref 8.5–10.1)
CHLORIDE SERPL-SCNC: 109 MMOL/L (ref 97–108)
CO2 SERPL-SCNC: 23 MMOL/L (ref 21–32)
CREAT SERPL-MCNC: 0.76 MG/DL (ref 0.7–1.3)
DIFFERENTIAL METHOD BLD: ABNORMAL
EOSINOPHIL # BLD: 0.22 K/UL (ref 0–0.4)
EOSINOPHIL NFR BLD: 2 % (ref 0–7)
ERYTHROCYTE [DISTWIDTH] IN BLOOD BY AUTOMATED COUNT: 15.4 % (ref 11.5–14.5)
GLUCOSE SERPL-MCNC: 76 MG/DL (ref 65–100)
HCT VFR BLD AUTO: 30.7 % (ref 36.6–50.3)
HGB BLD-MCNC: 8.5 G/DL (ref 12.1–17)
IMM GRANULOCYTES # BLD AUTO: 0.07 K/UL (ref 0–0.04)
IMM GRANULOCYTES NFR BLD AUTO: 0.6 % (ref 0–0.5)
LYMPHOCYTES # BLD: 0.69 K/UL (ref 0.8–3.5)
LYMPHOCYTES NFR BLD: 6.3 % (ref 12–49)
MCH RBC QN AUTO: 28.9 PG (ref 26–34)
MCHC RBC AUTO-ENTMCNC: 27.7 G/DL (ref 30–36.5)
MCV RBC AUTO: 104.4 FL (ref 80–99)
MONOCYTES # BLD: 0.9 K/UL (ref 0–1)
MONOCYTES NFR BLD: 8.3 % (ref 5–13)
NEUTS SEG # BLD: 8.99 K/UL (ref 1.8–8)
NEUTS SEG NFR BLD: 82.5 % (ref 32–75)
NRBC # BLD: 0 K/UL (ref 0–0.01)
NRBC BLD-RTO: 0 PER 100 WBC
PLATELET # BLD AUTO: 414 K/UL (ref 150–400)
PMV BLD AUTO: 9.8 FL (ref 8.9–12.9)
POTASSIUM SERPL-SCNC: 4.4 MMOL/L (ref 3.5–5.1)
RBC # BLD AUTO: 2.94 M/UL (ref 4.1–5.7)
RBC MORPH BLD: ABNORMAL
SODIUM SERPL-SCNC: 139 MMOL/L (ref 136–145)
WBC # BLD AUTO: 10.9 K/UL (ref 4.1–11.1)

## 2025-06-20 ENCOUNTER — HOSPITAL ENCOUNTER (OUTPATIENT)
Facility: HOSPITAL | Age: 60
Discharge: HOME | End: 2025-06-23
Payer: COMMERCIAL

## 2025-06-20 DIAGNOSIS — Z95.2 S/P AVR: ICD-10-CM

## 2025-06-20 DIAGNOSIS — I50.32 HEART FAILURE WITH IMPROVED EJECTION FRACTION (HFIMPEF) (HCC): ICD-10-CM

## 2025-06-20 PROCEDURE — 71046 X-RAY EXAM CHEST 2 VIEWS: CPT

## 2025-06-23 ENCOUNTER — OFFICE VISIT (OUTPATIENT)
Age: 60
End: 2025-06-23

## 2025-06-23 DIAGNOSIS — Z95.2 S/P AVR: ICD-10-CM

## 2025-06-23 DIAGNOSIS — I50.32 HEART FAILURE WITH IMPROVED EJECTION FRACTION (HFIMPEF) (HCC): ICD-10-CM

## 2025-06-23 DIAGNOSIS — Z98.890 S/P AORTIC ANEURYSM REPAIR: Primary | ICD-10-CM

## 2025-06-23 DIAGNOSIS — Z86.79 S/P AORTIC ANEURYSM REPAIR: Primary | ICD-10-CM

## 2025-06-23 PROCEDURE — 99024 POSTOP FOLLOW-UP VISIT: CPT | Performed by: THORACIC SURGERY (CARDIOTHORACIC VASCULAR SURGERY)

## 2025-06-23 RX ORDER — BUMETANIDE 0.5 MG/1
0.5 TABLET ORAL 2 TIMES DAILY
Qty: 10 TABLET | Refills: 0 | Status: SHIPPED | OUTPATIENT
Start: 2025-06-23 | End: 2025-06-27

## 2025-06-23 RX ORDER — POTASSIUM CHLORIDE 1500 MG/1
10 TABLET, EXTENDED RELEASE ORAL 2 TIMES DAILY
Qty: 5 TABLET | Refills: 0 | Status: SHIPPED | OUTPATIENT
Start: 2025-06-23 | End: 2025-06-27

## 2025-06-23 NOTE — PROGRESS NOTES
Called to touch base with patient. He is doing better. Using IS 20 x an hour while awake. He thinks this has helped a lot and that his respiratory status is \"trending in the right way\". States  nurse just left. His VS are okay. Weight was 236 this AM (down 3 lbs from yesterday). O2 92%. /81, HR 70. Following hold parameters for Coreg so he hasn't taken too many doses. States today is the first day he's seen his ankles in a while.     Reviewed labs and XR.     Dry weight appears to be 219 lbs.     Will cut diuretic dose by half and touch base with Mr. Díaz on Friday.    Instructed to call for dizziness/signs of dehydration in case we need to stop diuretic before then- and to notify us if respiratory status/swelling worsens. Reminded about no drugs/etoh/tobacco, watching salt and fluid intake, and to continue monitoring his vital signs and weights, mobilizing and using his IS as he has been.

## 2025-06-27 ENCOUNTER — HOSPITAL ENCOUNTER (OUTPATIENT)
Facility: HOSPITAL | Age: 60
Discharge: HOME | End: 2025-06-30

## 2025-06-27 ENCOUNTER — OFFICE VISIT (OUTPATIENT)
Age: 60
End: 2025-06-27

## 2025-06-27 DIAGNOSIS — Z86.79 S/P AORTIC ANEURYSM REPAIR: ICD-10-CM

## 2025-06-27 DIAGNOSIS — I50.32 HEART FAILURE WITH IMPROVED EJECTION FRACTION (HFIMPEF) (HCC): ICD-10-CM

## 2025-06-27 DIAGNOSIS — I50.32 HEART FAILURE WITH IMPROVED EJECTION FRACTION (HFIMPEF) (HCC): Primary | ICD-10-CM

## 2025-06-27 DIAGNOSIS — Z98.890 S/P AORTIC ANEURYSM REPAIR: ICD-10-CM

## 2025-06-27 DIAGNOSIS — Z95.2 S/P AVR: ICD-10-CM

## 2025-06-27 PROCEDURE — 99024 POSTOP FOLLOW-UP VISIT: CPT | Performed by: THORACIC SURGERY (CARDIOTHORACIC VASCULAR SURGERY)

## 2025-06-27 RX ORDER — BUMETANIDE 0.5 MG/1
0.5 TABLET ORAL DAILY
Qty: 5 TABLET | Refills: 0 | Status: SHIPPED | OUTPATIENT
Start: 2025-06-27 | End: 2025-07-02

## 2025-06-27 RX ORDER — POTASSIUM CHLORIDE 750 MG/1
10 TABLET, EXTENDED RELEASE ORAL DAILY
Qty: 5 TABLET | Refills: 0 | Status: SHIPPED | OUTPATIENT
Start: 2025-06-27 | End: 2025-07-02

## 2025-06-27 NOTE — PROGRESS NOTES
Called to check on patient. States that he is \"good actually\". Thinks that he is making improvements. \"Slacked off\" on his IS over the past few days and can tell a difference- he used it this morning and felt better after. Weight is 234 lbs. BP 120s/80s, HR 80s. Feet are a still a little bit swollen but no more swelling in his legs. Avoiding salt, drinking to thirst, no drugs/etoh/tobacco.  He is coming up here today to  some paper work. We will cut his Bumex down to daily, but I instructed him to call us for worsening SOB, leg swelling, etc so that we can increase the dose or frequency again. Will have him get repeat labs while he's up here and will touch base with him again next week.

## 2025-06-28 LAB
ANION GAP SERPL CALC-SCNC: 4 MMOL/L (ref 2–12)
BUN SERPL-MCNC: 16 MG/DL (ref 6–20)
BUN/CREAT SERPL: 17 (ref 12–20)
CALCIUM SERPL-MCNC: 8.8 MG/DL (ref 8.5–10.1)
CHLORIDE SERPL-SCNC: 105 MMOL/L (ref 97–108)
CO2 SERPL-SCNC: 31 MMOL/L (ref 21–32)
CREAT SERPL-MCNC: 0.92 MG/DL (ref 0.7–1.3)
GLUCOSE SERPL-MCNC: 93 MG/DL (ref 65–100)
POTASSIUM SERPL-SCNC: 4 MMOL/L (ref 3.5–5.1)
SODIUM SERPL-SCNC: 140 MMOL/L (ref 136–145)

## 2025-06-30 ENCOUNTER — TELEPHONE (OUTPATIENT)
Age: 60
End: 2025-06-30

## 2025-07-01 ENCOUNTER — OFFICE VISIT (OUTPATIENT)
Age: 60
End: 2025-07-01

## 2025-07-01 DIAGNOSIS — Z95.2 S/P AVR: Primary | ICD-10-CM

## 2025-07-01 PROCEDURE — 99024 POSTOP FOLLOW-UP VISIT: CPT | Performed by: THORACIC SURGERY (CARDIOTHORACIC VASCULAR SURGERY)

## 2025-07-01 NOTE — PROGRESS NOTES
Called pt to check in on his progress. He reports he is feeling much better. Sleeping easier, even able to lay on his side. He is breathing better. LE edema is markedly improved.     Pt reports he feels he is ready to start Cardiac Rehab. Wishes to have referral sent to [facility]. Order placed at this time.     Pt instructed to call our office if he has any further concerns or questions.

## 2025-07-17 ENCOUNTER — OFFICE VISIT (OUTPATIENT)
Age: 60
End: 2025-07-17

## 2025-07-17 VITALS
WEIGHT: 237 LBS | HEART RATE: 67 BPM | SYSTOLIC BLOOD PRESSURE: 112 MMHG | BODY MASS INDEX: 33.05 KG/M2 | DIASTOLIC BLOOD PRESSURE: 77 MMHG | OXYGEN SATURATION: 93 % | TEMPERATURE: 97.9 F

## 2025-07-17 DIAGNOSIS — Z95.2 S/P AVR: Primary | ICD-10-CM

## 2025-07-17 DIAGNOSIS — Z86.79 S/P AORTIC ANEURYSM REPAIR: ICD-10-CM

## 2025-07-17 DIAGNOSIS — Z98.890 S/P AORTIC ANEURYSM REPAIR: ICD-10-CM

## 2025-07-17 DIAGNOSIS — Z98.890 S/P LEFT ATRIAL APPENDAGE LIGATION: ICD-10-CM

## 2025-07-17 PROCEDURE — 99024 POSTOP FOLLOW-UP VISIT: CPT | Performed by: THORACIC SURGERY (CARDIOTHORACIC VASCULAR SURGERY)

## 2025-07-17 RX ORDER — POTASSIUM CHLORIDE 1500 MG/1
20 TABLET, EXTENDED RELEASE ORAL DAILY
Qty: 3 TABLET | Refills: 0
Start: 2025-07-17 | End: 2025-07-20

## 2025-07-17 RX ORDER — FUROSEMIDE 40 MG/1
40 TABLET ORAL DAILY
Qty: 3 TABLET | Refills: 0 | Status: SHIPPED | OUTPATIENT
Start: 2025-07-17

## 2025-07-17 NOTE — PROGRESS NOTES
Name: Jose Díaz III   : 1965   Home Phone: [phone]     Reviewed record in preparation for visit and have obtained necessary documentation. Identified pt with two pt identifiers (name and ).     1. Have you been to the ER, urgent care clinic since your last visit?  Hospitalized since your last visit?No    2. Have you seen or consulted any other health care providers outside of the [facility] since your last visit?  Include any pap smears or colon screening. No      Jose Díaz III is being seen for AVR/AVr follow up approximately One month post-operatively”.     Patient counseled on Wound care, Diet, ERAS Protocol, Signs of infection, and Office contact information and instructed to follow up  with primary cardiologist. Patient given opportunity to ask questions and receive clarification.     Current Medications-Reviewed with Patient    Allergies-Reviewed with Patient        Vitals  /77   Pulse 67   Temp 97.9 °F (36.6 °C)   Wt 107.5 kg (237 lb)   SpO2 93%   BMI 33.05 kg/m²

## 2025-07-17 NOTE — PROGRESS NOTES
Patient: Jose Díaz III   Age: 59 y.o.     Patient Care Team:  Duglas Soto MD as Consulting Physician (Cardiothoracic Surgery)    Diagnosis: The primary encounter diagnosis was S/P AVR. Diagnoses of S/P aortic aneurysm repair and S/P left atrial appendage ligation were also pertinent to this visit.    Problem List:   Patient Active Problem List   Diagnosis    Acute hypoxemic respiratory failure (HCC)    Acute respiratory failure with hypoxemia (HCC)    Bilateral carotid artery stenosis    Aortic stenosis    Aortic insufficiency    Severe aortic stenosis    S/P AVR    S/P aortic aneurysm repair    S/P left atrial appendage ligation      Date of Surgery: 6/4/25     Surgery: AVR, ascending aorta repair, LAAL    HPI:  Pt is here for post op 1 month follow up, seen with Dr. Soto. His weight is stable but he complains of some pedal edema and SOB with exertion. He is ambulating well. Denies other complaints.     Current Medications:   Current Outpatient Medications   Medication Sig Dispense Refill    furosemide (LASIX) 40 MG tablet Take 1 tablet by mouth daily 3 tablet 0    potassium chloride (KLOR-CON M) 20 MEQ extended release tablet Take 1 tablet by mouth daily for 3 days 3 tablet 0    carvedilol (COREG) 3.125 MG tablet Take 1 tablet by mouth 2 times daily Hold for heart rate less than 60 or systolic blood pressure less than 110 60 tablet 1    empagliflozin (JARDIANCE) 10 MG tablet Take 1 tablet by mouth daily 30 tablet 3    atorvastatin (LIPITOR) 40 MG tablet Take 1 tablet by mouth nightly 30 tablet 3    tamsulosin (FLOMAX) 0.4 MG capsule Take 1 capsule by mouth daily 30 capsule 3    aspirin 81 MG chewable tablet Take 1 tablet by mouth daily 30 tablet 3    fluticasone-umeclidin-vilant (TRELEGY ELLIPTA) 100-62.5-25 MCG/ACT AEPB inhaler Inhale 1 puff into the lungs daily 1 each 3    pantoprazole (PROTONIX) 40 MG tablet Take 1 tablet by mouth every morning (before breakfast) 30 tablet 3    albuterol sulfate HFA

## (undated) PROCEDURE — B2111ZZ FLUOROSCOPY OF MULTIPLE CORONARY ARTERIES USING LOW OSMOLAR CONTRAST: ICD-10-PCS

## (undated) PROCEDURE — 4A023N7 MEASUREMENT OF CARDIAC SAMPLING AND PRESSURE, LEFT HEART, PERCUTANEOUS APPROACH: ICD-10-PCS

## (undated) DEVICE — CANNULA SUCT TIP L8MM DIA24FR INTCARD RIG SUC 0.25IN CONN

## (undated) DEVICE — COVER,TABLE,HEAVY DUTY,77"X90",STRL: Brand: MEDLINE

## (undated) DEVICE — PROVE COVER: Brand: UNBRANDED

## (undated) DEVICE — Device

## (undated) DEVICE — Device: Brand: JELCO

## (undated) DEVICE — CATHETER COR DIAG MP MPA 5FR 100CM 2 SIDE H DXTERITY

## (undated) DEVICE — ADHESIVE SKIN CLOSURE XL 42 CM 2.7 CC MESH LIQUIBAND SECUR

## (undated) DEVICE — SOLUTION IRRIG 1000ML H2O PIC PLAS SHATTERPROOF CONTAINER

## (undated) DEVICE — STERNUM BLADE, OFFSET (32.0 X 0.8 X 6.3MM)

## (undated) DEVICE — SC 3W HP RA OFF NB - PG: Brand: NAMIC

## (undated) DEVICE — GLOVE SURG SZ 8 L12IN FNGR THK79MIL GRN LTX FREE

## (undated) DEVICE — SUTURE MONOCRYL ABSORBABLE 3-0 PS-1 18 IN UD MONOCRYL + STRATAFIX SXMP1B102

## (undated) DEVICE — DUAL LUMEN STOMACH TUBE MULTI-FUNCTIONAL PORT: Brand: SALEM SUMP

## (undated) DEVICE — CENTRAL VENOUS CATHETER SET: Brand: COOK

## (undated) DEVICE — SYRINGE MED 10ML LUERLOCK TIP W/O SFTY DISP

## (undated) DEVICE — SOLUTION IV 500 ML 0.9 NACL INJ EXCEL CONTAINER USP LF

## (undated) DEVICE — CANISTER, RIGID, 3000CC: Brand: MEDLINE INDUSTRIES, INC.

## (undated) DEVICE — TEMP PACING WIRE: Brand: MYO/WIRE

## (undated) DEVICE — SUTURE SZ 7 L18IN NONABSORBABLE SIL CCS L48MM 1/2 CIR STRNM M655G

## (undated) DEVICE — OPEN HEART B-RICHMOND: Brand: MEDLINE INDUSTRIES, INC.

## (undated) DEVICE — TR BAND RADIAL ARTERY COMPRESSION DEVICE: Brand: TR BAND

## (undated) DEVICE — SYRINGE 20ML LL S/C 50

## (undated) DEVICE — SET PERF L203CM 12IN RED AND BLU AORT ROOT MULT SLIP CONN

## (undated) DEVICE — SURGIFOAM SPNG SZ 100

## (undated) DEVICE — CATHETER VENT 20FR L15IN TIP PERF 2.75IN BULL W/ 1/4IN SLIP

## (undated) DEVICE — INTENT TO BE USED WITH SUTURE MATERIAL FOR TISSUE CLOSURE: Brand: RICHARD-ALLAN® NEEDLE 1/2 CIRCLE TAPER

## (undated) DEVICE — MEDI-TRACE CADENCE ADULT, DEFIBRILLATION ELECTRODE -RTS  (10 PR/PK) - PHYSIO-CONTROL: Brand: MEDI-TRACE CADENCE

## (undated) DEVICE — SYRINGE MED 30ML STD CLR PLAS LUERLOCK TIP N CTRL DISP

## (undated) DEVICE — SOLUTION IV 1000 ML 0.9 NACL INJ USP EXCEL PLAS CONTAINER

## (undated) DEVICE — ELECTRODE PT RET AD L9FT HI MOIST COND ADH HYDRGEL CORDED

## (undated) DEVICE — KIT ACCS INTRO 4FR L10CM NDL 21GA L7CM GWIRE L40CM

## (undated) DEVICE — 6 FOOT DISPOSABLE EXTENSION CABLE WITH SAFE CONNECT / SCREW-DOWN

## (undated) DEVICE — GLIDESHEATH SLENDER ACCESS KIT: Brand: GLIDESHEATH SLENDER

## (undated) DEVICE — TAPE SURG W4INXL10YD SFT CLTH H2O RESIST MEDIPORE H

## (undated) DEVICE — DRESSING FOAM W8.7XL9.1IN SAFETAC LAYR SELF ADH MEPILEX

## (undated) DEVICE — OPEN HEART A- RICHMOND: Brand: MEDLINE INDUSTRIES, INC.

## (undated) DEVICE — SUTURE VICRYL SZ 0 L18IN ABSRB VLT L36MM CT-1 1/2 CIR J740D

## (undated) DEVICE — TUBING PRSS MON L6IN PVC M FEM CONN

## (undated) DEVICE — RADIFOCUS OPTITORQUE ANGIOGRAPHIC CATHETER: Brand: OPTITORQUE

## (undated) DEVICE — YANKAUER,BULB TIP,W/O VENT,RIGID,STERILE: Brand: MEDLINE

## (undated) DEVICE — SOLUTION IRRIG 1000ML 09% SOD CHL USP PIC PLAS CONTAINER

## (undated) DEVICE — GUIDEWIRE VASC L260CM 0.035IN J TIP L3MM PTFE FIX COR NAMIC

## (undated) DEVICE — 3M™ TEGADERM™ TRANSPARENT FILM DRESSING FRAME STYLE, 1626W, 4 IN X 4-3/4 IN (10 CM X 12 CM), 50/CT 4CT/CASE: Brand: 3M™ TEGADERM™

## (undated) DEVICE — GOWN,SIRUS,NONRNF,SETINSLV,XL,20/CS: Brand: MEDLINE

## (undated) DEVICE — SOLUTION IV 1000ML 140MEQ/L SOD 5MEQ/L K 3MEQ/L MG 27MEQ/L

## (undated) DEVICE — TUBING, SUCTION, 1/4" X 10', STRAIGHT: Brand: MEDLINE

## (undated) DEVICE — SUMP PERICARD AD 20FR WT TIP VERSATILE DSGN MULT PRT VENT

## (undated) DEVICE — TOURNIQUET 12FR L7IN 3 CLR 1 SNR VENA CAVA DLP

## (undated) DEVICE — SUTURE PERMA-HAND SZ 2 L60IN NONABSORBABLE BLK SILK BRAID SA8H

## (undated) DEVICE — SYRINGE MED 50ML LUERLOCK TIP

## (undated) DEVICE — DRAPE SLUSH DISC W44XL66IN ST FOR RND BSIN HUSH SLUSH SYS

## (undated) DEVICE — BLANKET WRM W25XL64IN NONWOVEN SFT LTWT PLIABLE HYPR

## (undated) DEVICE — 72" ARTERIAL PRESSURE TUBING: Brand: ICU MEDICAL

## (undated) DEVICE — GUIDEWIRE ANGIO L150CM OD0.035IN STR FIX PTFE SLD SUPP

## (undated) DEVICE — EZ GLIDE AORTIC CANNULA: Brand: EDWARDS LIFESCIENCES EZ GLIDE AORTIC CANNULA

## (undated) DEVICE — SUTURE PROL SZ 4-0 L36IN NONABSORBABLE BLU L26MM SH 1/2 CIR 8521H

## (undated) DEVICE — GLOVE ORANGE PI 7 1/2   MSG9075

## (undated) DEVICE — LEAD PACE L475MM CHNL A OR V MYOCARDIAL STEROID ELUT SIL

## (undated) DEVICE — HEART CATH-MRMC: Brand: MEDLINE INDUSTRIES, INC.

## (undated) DEVICE — PRESSURE MONITORING SET: Brand: TRUWAVE

## (undated) DEVICE — HI-TORQUE VERSACORE FLOPPY GUIDE WIRE SYSTEM 145 CM: Brand: HI-TORQUE VERSACORE

## (undated) DEVICE — TOWEL,OR,DSP,ST,BLUE,STD,2/PK,40PK/CS: Brand: MEDLINE

## (undated) DEVICE — SPONGE GZ W4XL4IN COT RADPQ HIGHLY ABSRB STERILE

## (undated) DEVICE — CATHETER DIAG 5FR L100CM LUMN ID0.047IN JR4 CRV 0 SIDE H

## (undated) DEVICE — CANNULA PERF L55IN OD7FR AORT ROOT AG STD TIP FLOW GRD INTRO

## (undated) DEVICE — AVID DUAL STAGE VENOUS DRAINAGE CANNULA: Brand: AVID DUAL STAGE VENOUS DRAINAGE CANNULA

## (undated) DEVICE — DRAIN SURG SGL COLL PT TB FOR ATS BG OASIS

## (undated) DEVICE — RETRACTOR SURG INSRT SUT HLD OCTOBASE

## (undated) DEVICE — SUTURE ETHIBOND 2-0 30IN NONABSORB GRN WHT V-5 17MM 1/2 PXX52N

## (undated) DEVICE — SYRINGE MEDICAL 3ML CLEAR PLASTIC STANDARD NON CONTROL LUERLOCK TIP DISPOSABLE

## (undated) DEVICE — CANNULA PERF 15FR L12.5IN RG STPCOCK WIREWOUND BODY

## (undated) DEVICE — TRANSFER BAG 300 ML: Brand: HAEMONETICS

## (undated) DEVICE — CATHETER THOR 32FR L23IN PVC 6 EYELET STR ATRAUM

## (undated) DEVICE — SYRINGE ANGIO 10 CC BRL STD PRNT POLYCARB LT BLU MEDALLION

## (undated) DEVICE — SWAN-GANZ TRUE SIZE THERMODULTION CATHETER, 5F: Brand: SWAN-GANZ TRUE SIZE

## (undated) DEVICE — GUIDEWIRE VASC L40CM DIA0018IN NDL 21GA L7CM Z S STL MAK